# Patient Record
Sex: MALE | Race: OTHER | HISPANIC OR LATINO | Employment: OTHER | URBAN - METROPOLITAN AREA
[De-identification: names, ages, dates, MRNs, and addresses within clinical notes are randomized per-mention and may not be internally consistent; named-entity substitution may affect disease eponyms.]

---

## 2017-10-09 ENCOUNTER — HOSPITAL ENCOUNTER (EMERGENCY)
Facility: HOSPITAL | Age: 27
Discharge: HOME | End: 2017-10-09
Attending: EMERGENCY MEDICINE | Admitting: EMERGENCY MEDICINE
Payer: COMMERCIAL

## 2017-10-09 VITALS
HEART RATE: 66 BPM | TEMPERATURE: 97.7 F | DIASTOLIC BLOOD PRESSURE: 68 MMHG | WEIGHT: 175 LBS | RESPIRATION RATE: 18 BRPM | OXYGEN SATURATION: 99 % | SYSTOLIC BLOOD PRESSURE: 131 MMHG

## 2017-10-09 DIAGNOSIS — R51.9 HEADACHE: Primary | ICD-10-CM

## 2017-10-09 PROCEDURE — 99283 EMERGENCY DEPT VISIT LOW MDM: CPT

## 2017-10-09 PROCEDURE — 96374 THER/PROPH/DIAG INJ IV PUSH: CPT

## 2017-10-09 PROCEDURE — 96375 TX/PRO/DX INJ NEW DRUG ADDON: CPT

## 2017-10-09 PROCEDURE — 96361 HYDRATE IV INFUSION ADD-ON: CPT

## 2017-10-09 RX ORDER — METOCLOPRAMIDE HYDROCHLORIDE 5 MG/ML
10 INJECTION INTRAMUSCULAR; INTRAVENOUS ONCE
Status: COMPLETED | OUTPATIENT
Start: 2017-10-09 | End: 2017-10-09

## 2017-10-09 RX ORDER — LISINOPRIL 20 MG/1
20 TABLET ORAL DAILY
COMMUNITY
End: 2017-11-23

## 2017-10-09 RX ORDER — DIPHENHYDRAMINE HYDROCHLORIDE 50 MG/ML
25 INJECTION INTRAMUSCULAR; INTRAVENOUS ONCE
Status: COMPLETED | OUTPATIENT
Start: 2017-10-09 | End: 2017-10-09

## 2017-10-09 RX ADMIN — METOCLOPRAMIDE 10 MG: 5 INJECTION, SOLUTION INTRAMUSCULAR; INTRAVENOUS at 19:15

## 2017-10-09 RX ADMIN — DIPHENHYDRAMINE HYDROCHLORIDE 25 MG: 50 INJECTION, SOLUTION INTRAMUSCULAR; INTRAVENOUS at 19:12

## 2017-10-09 RX ADMIN — SODIUM CHLORIDE 1000 ML: 0.9 INJECTION, SOLUTION INTRAVENOUS at 19:08

## 2017-10-09 NOTE — ED PROVIDER NOTES
History  Chief Complaint   Patient presents with    Headache     pt reports head pain x past few days  + nausea  pt reports "my neurologist told me to come in for oxygen "     48-year-old male with a past medical history of Parkinson's disease and repeated head trauma reporting to the ER today with a 5 day history of headache  States the headache started by temporal then went to the right side of his occiput and now is on the left  Describes a sharp sensation  Worsened by light  Has described nausea but no vomiting  Denies neck pain, fevers, chills  History provided by:  Patient   used: No    Headache   Pain location:  Generalized  Quality:  Sharp  Severity currently:  4/10  Severity at highest:  8/10  Onset quality:  Gradual  Duration:  5 days  Timing:  Constant  Progression:  Worsening  Chronicity:  Recurrent  Context: bright light    Relieved by:  Nothing  Worsened by:  Nothing  Ineffective treatments:  None tried  Associated symptoms: photophobia    Associated symptoms: no abdominal pain, no back pain, no blurred vision, no congestion, no cough, no diarrhea, no dizziness, no drainage, no ear pain, no eye pain, no facial pain, no fatigue, no fever, no focal weakness, no hearing loss, no loss of balance, no myalgias, no nausea, no near-syncope, no neck pain, no neck stiffness, no numbness, no paresthesias, no seizures, no sinus pressure, no sore throat, no swollen glands, no syncope, no tingling, no URI, no visual change, no vomiting and no weakness    Risk factors: no anger, no family hx of SAH, does not have insomnia and lifestyle not sedentary        Prior to Admission Medications   Prescriptions Last Dose Informant Patient Reported?  Taking?   carbidopa-levodopa (SINEMET)  mg per tablet 10/9/2017 at 1530  Yes Yes   Sig: Take 1 tablet by mouth 3 (three) times a day   lisinopril (ZESTRIL) 20 mg tablet Past Week at Unknown time  Yes Yes   Sig: Take 20 mg by mouth daily Facility-Administered Medications: None       Past Medical History:   Diagnosis Date    Hypertension     MRSA carrier     Parkinson disease ([facility] [address] )        Past Surgical History:   Procedure Laterality Date    NO PAST SURGERIES         History reviewed  No pertinent family history  I have reviewed and agree with the history as documented  Social History   Substance Use Topics    Smoking status: Never Smoker    Smokeless tobacco: Never Used    Alcohol use No        Review of Systems   Constitutional: Negative for fatigue and fever  HENT: Negative for congestion, ear pain, hearing loss, postnasal drip, sinus pressure and sore throat  Eyes: Positive for photophobia  Negative for blurred vision and pain  Respiratory: Negative for cough  Cardiovascular: Negative for syncope and near-syncope  Gastrointestinal: Negative for abdominal pain, diarrhea, nausea and vomiting  Musculoskeletal: Negative for back pain, myalgias, neck pain and neck stiffness  Neurological: Positive for headaches  Negative for dizziness, focal weakness, seizures, weakness, numbness, paresthesias and loss of balance  All other systems reviewed and are negative  Physical Exam  ED Triage Vitals   Temperature Pulse Respirations Blood Pressure SpO2   10/09/17 1817 10/09/17 1817 10/09/17 1817 10/09/17 1817 10/09/17 1817   (!) 96 6 °F (35 9 °C) 89 18 (!) 169/107 98 %      Temp Source Heart Rate Source Patient Position - Orthostatic VS BP Location FiO2 (%)   10/09/17 1817 10/09/17 1817 10/09/17 1817 10/09/17 1817 --   Tympanic Monitor Sitting Left arm       Pain Score       10/09/17 1818       6           Physical Exam   Constitutional: He is oriented to person, place, and time  He appears well-developed and well-nourished  HENT:   Head: Normocephalic and atraumatic     Right Ear: External ear normal    Left Ear: External ear normal    Nose: Nose normal    Mouth/Throat: Oropharynx is clear and moist    Eyes: Conjunctivae and EOM are normal  Pupils are equal, round, and reactive to light  Neck: Normal range of motion  Neck supple  No JVD present  No tracheal deviation present  No thyromegaly present  Cardiovascular: Normal rate, regular rhythm, normal heart sounds and intact distal pulses  Exam reveals no gallop and no friction rub  No murmur heard  Pulmonary/Chest: Effort normal and breath sounds normal  No stridor  No respiratory distress  He has no wheezes  He has no rales  He exhibits no tenderness  Abdominal: Soft  Bowel sounds are normal  He exhibits no distension and no mass  There is no tenderness  There is no rebound and no guarding  No hernia  Musculoskeletal: Normal range of motion  He exhibits no edema, tenderness or deformity  Lymphadenopathy:     He has no cervical adenopathy  Neurological: He is alert and oriented to person, place, and time  He has normal reflexes  He displays normal reflexes  No cranial nerve deficit  He exhibits normal muscle tone  Coordination normal    Skin: Skin is warm  No rash noted  No erythema  No pallor  Psychiatric: He has a normal mood and affect  His behavior is normal  Judgment and thought content normal    Nursing note and vitals reviewed  ED Medications  Medications   sodium chloride 0 9 % bolus 1,000 mL (1,000 mL Intravenous New Bag 10/9/17 1908)   metoclopramide (REGLAN) injection 10 mg (10 mg Intravenous Given 10/9/17 1915)   diphenhydrAMINE (BENADRYL) injection 25 mg (25 mg Intravenous Given 10/9/17 1912)       Diagnostic Studies  Labs Reviewed - No data to display    No orders to display       Procedures  Procedures      Phone Consults  ED Phone Contact    ED Course  ED Course as of Oct 09 1959   Mon Oct 09, 2017   1949 On re-evaluation patient states his pain is now 0/10  States his nausea is gone                                  MDM  Number of Diagnoses or Management Options  Headache: new and requires workup  Diagnosis management comments: A/P:  28year-old male, with a past medical history of newly diagnosed Parkinson's disease, presenting to the ER today with 5 day history of headache  Migraine versus cluster headache  Will treat patient with Toradol, Reglan, Benadryl  Will put patient on 2 L nasal cannula  ED Course:   Patient has significant relief of his symptoms via intravenous medications and oxygen  Disposition:  I will discharge patient at this time with follow-up with his neurologist              Amount and/or Complexity of Data Reviewed  Clinical lab tests: ordered and reviewed  Tests in the radiology section of CPT®: ordered and reviewed  Tests in the medicine section of CPT®: reviewed and ordered  Decide to obtain previous medical records or to obtain history from someone other than the patient: yes  Independent visualization of images, tracings, or specimens: yes    Patient Progress  Patient progress: stable    CritCare Time    Disposition  Final diagnoses:   Headache     ED Disposition     ED Disposition Condition Comment    Discharge  [facility] discharge to home/self care  Condition at discharge: Good        Follow-up Information     Follow up With Specialties Details Why Contact Info    Darylene Ness, CRNP Family Medicine Schedule an appointment as soon as possible for a visit  [address] [place], [place] [address]  [phone]          Patient's Medications   Discharge Prescriptions    No medications on file     No discharge procedures on file  ED Provider  Attending physically available and evaluated [facility]  I managed the patient along with the ED Attending      Electronically Signed by       Rosalee Cranker, DO  Resident  10/09/17 0509

## 2017-10-09 NOTE — ED ATTENDING ATTESTATION
I, Raya James DO, saw and evaluated the patient  I have discussed the patient with the resident/non-physician practitioner and agree with the resident's/non-physician practitioner's findings, Plan of Care, and MDM as documented in the resident's/non-physician practitioner's note, except where noted  All available labs and Radiology studies were reviewed  At this point I agree with the current assessment done in the Emergency Department  I have conducted an independent evaluation of this patient a history and physical is as follows:      Critical Care Time  CritCare Time      28 yr old male with headache for the last five days  Wax and wane  Sharp pains in neck and trap that moves from left to right  Neck always stiff and sore  No eye tearing or clustering  Getting the discomfort for over two years  Dx with Parkinsons two months ago and started on Carbo/levo  ? Related  Exm: NAD, disk sharp wo light sense,  Tender left trap more than right with some limit to rotation to the left and more discomfort  Neg nuchal   No rash, fever or nodes  Cranial grossly intact  PLn: headache cocktail with fu with his neuro tomorrow  Told not to wait for appt on the 24th

## 2017-10-09 NOTE — DISCHARGE INSTRUCTIONS
Acute Headache, Ambulatory Care   GENERAL INFORMATION:   An acute headache  is pain or discomfort that starts suddenly and gets worse quickly  The cause of an acute headache may not be known  It may be triggered by stress, fatigue, hormones, food, or trauma  Common related symptoms include the following:   · Fever    · Sinus pressure    · Loss of memory    · Nausea or vomiting    · Problems with your vision, such as watery or red eyes, loss of vision, or pain in bright light    · Stiff neck    · Tenderness of the head and neck area    · Trouble staying awake, or being less alert than usual     · Weakness or less energy  Seek immediate care for the following symptoms:   · Severe pain    · A headache that occurs after a blow to the head, a fall, or other trauma     · Confusion or forgetfulness    · Numbness on one side of your face or body  Treatment for an acute headache  may include medicine to decrease pain  You may also need biofeedback or cognitive behavioral therapy  Ask your healthcare provider about these and other treatments for an acute headache  Manage my symptoms:   · Apply heat  on your head for 20 to 30 minutes every 2 hours for as many days as directed  Heat helps decrease pain and muscle spasms  You may alternate heat and ice  · Apply ice  on your head for 15 to 20 minutes every hour or as directed  Use an ice pack, or put crushed ice in a plastic bag  Cover it with a towel  Ice helps decrease pain  · Relax your muscles  Lie down in a comfortable position and close your eyes  Relax your muscles slowly  Start at your toes and work your way up your body  · Keep a record of your headaches  Write down when your headaches start and stop  Include your symptoms and what you were doing when the headache began  Record what you ate or drank for 24 hours before the headache started  Describe the pain and where it hurts  Keep track of what you did to treat your headache and whether it worked    Follow up with your healthcare provider as directed:  Bring your headache record with you when you see your healthcare provider  Write down your questions so you remember to ask them during your visits  CARE AGREEMENT:   You have the right to help plan your care  Learn about your health condition and how it may be treated  Discuss treatment options with your caregivers to decide what care you want to receive  You always have the right to refuse treatment  The above information is an  only  It is not intended as medical advice for individual conditions or treatments  Talk to your doctor, nurse or pharmacist before following any medical regimen to see if it is safe and effective for you  © 2014 [address] is for End User's use only and may not be sold, redistributed or otherwise used for commercial purposes  All illustrations and images included in CareNotes® are the copyrighted property of [employer]  or Andrew Reyes

## 2017-11-23 ENCOUNTER — HOSPITAL ENCOUNTER (EMERGENCY)
Facility: HOSPITAL | Age: 27
Discharge: HOME | End: 2017-11-23
Attending: EMERGENCY MEDICINE | Admitting: EMERGENCY MEDICINE
Payer: COMMERCIAL

## 2017-11-23 VITALS
TEMPERATURE: 97.8 F | OXYGEN SATURATION: 97 % | RESPIRATION RATE: 18 BRPM | DIASTOLIC BLOOD PRESSURE: 92 MMHG | SYSTOLIC BLOOD PRESSURE: 142 MMHG | HEART RATE: 88 BPM

## 2017-11-23 DIAGNOSIS — K02.9 INFECTED DENTAL CARRIES: Primary | ICD-10-CM

## 2017-11-23 DIAGNOSIS — K04.7 INFECTED DENTAL CARRIES: Primary | ICD-10-CM

## 2017-11-23 PROCEDURE — 99282 EMERGENCY DEPT VISIT SF MDM: CPT

## 2017-11-23 RX ORDER — NAPROXEN 500 MG/1
500 TABLET ORAL 2 TIMES DAILY WITH MEALS
Qty: 20 TABLET | Refills: 0 | Status: SHIPPED | OUTPATIENT
Start: 2017-11-23 | End: 2018-05-10

## 2017-11-23 RX ORDER — NAPROXEN 500 MG/1
500 TABLET ORAL ONCE
Status: DISCONTINUED | OUTPATIENT
Start: 2017-11-23 | End: 2017-11-23 | Stop reason: HOSPADM

## 2017-11-23 RX ORDER — PENICILLIN V POTASSIUM 500 MG/1
500 TABLET ORAL 4 TIMES DAILY
Qty: 28 TABLET | Refills: 0 | Status: SHIPPED | OUTPATIENT
Start: 2017-11-23 | End: 2017-11-30

## 2017-11-23 RX ORDER — ACETAMINOPHEN 500 MG
1000 TABLET ORAL EVERY 6 HOURS PRN
Qty: 30 TABLET | Refills: 0 | Status: SHIPPED | OUTPATIENT
Start: 2017-11-23 | End: 2018-05-10

## 2017-11-23 RX ORDER — PENICILLIN V POTASSIUM 250 MG/1
500 TABLET ORAL ONCE
Status: DISCONTINUED | OUTPATIENT
Start: 2017-11-23 | End: 2017-11-23 | Stop reason: HOSPADM

## 2017-11-23 RX ORDER — ACETAMINOPHEN 325 MG/1
975 TABLET ORAL ONCE
Status: DISCONTINUED | OUTPATIENT
Start: 2017-11-23 | End: 2017-11-23 | Stop reason: HOSPADM

## 2017-11-23 NOTE — DISCHARGE INSTRUCTIONS
Dental Caries   WHAT YOU NEED TO KNOW:   Dental caries are also called cavities  Cavities are caused by bacteria  When the bacteria in tooth plaque (sticky film) mix with certain types of carbohydrate, this creates acid  The acid breaks down areas of enamel, which covers the outside of a tooth  This creates a small hole in the tooth called a cavity  DISCHARGE INSTRUCTIONS:   Return to the emergency department if:   · Your face, jaw, cheek, eye, or neck begin to swell  Contact your dentist if:   · You have a fever  · Your tooth pain gets worse  · You have questions or concerns about your condition or care  Follow up with your dentist as directed:  Write down your questions so you remember to ask them during your visits  Prevent dental caries:   · Brush your teeth at least 2 times a day with fluoride toothpaste  · Use dental floss to clean between your teeth at least once a day  · Rinse your mouth with water or mouthwash after meals and snacks  · Chew sugarless gum after meals and snacks  · See your dentist regularly for dental cleanings and oral exams  © 2017 [address] is for End User's use only and may not be sold, redistributed or otherwise used for commercial purposes  All illustrations and images included in CareNotes® are the copyrighted property of [employer]  or Andrew Reyes  The above information is an  only  It is not intended as medical advice for individual conditions or treatments  Talk to your doctor, nurse or pharmacist before following any medical regimen to see if it is safe and effective for you

## 2017-11-23 NOTE — ED PROVIDER NOTES
History  Chief Complaint   Patient presents with    Dental Pain     Pt c/o right sided dental pain  Pt states he has had a hole in it for about 4 months  Pt presents with increased pain     This is a 28 y o  old male who presents to the ED for evaluation of dental pain  Patient has had pain in his tooth on the right side for 3 or 4 months  Got acutely worse yesterday  He attempted to brush it and use mouthwash which has helped in the past and it did not help this time  He he has not used any medications to help with it  No facial swelling  No discharge  Eating on feel any masses  It does not have a dentist           Prior to Admission Medications   Prescriptions Last Dose Informant Patient Reported? Taking?   carbidopa-levodopa (SINEMET)  mg per tablet 11/22/2017 at Unknown time  Yes Yes   Sig: Take 1 tablet by mouth 3 (three) times a day      Facility-Administered Medications: None     Past Medical History:   Diagnosis Date    GERD (gastroesophageal reflux disease)     Hypertension     MRSA carrier     Parkinson disease ([facility] [address] )      Past Surgical History:   Procedure Laterality Date    NO PAST SURGERIES       History reviewed  No pertinent family history  I have reviewed and agree with the history as documented  Social History   Substance Use Topics    Smoking status: Former Smoker     Quit date: 2009    Smokeless tobacco: Never Used    Alcohol use Yes      Comment: rarely      Review of Systems   Constitutional: Negative for fatigue and fever  HENT: Positive for dental problem  Negative for congestion and rhinorrhea  Respiratory: Negative for cough  Cardiovascular: Negative for chest pain  Gastrointestinal: Negative for abdominal pain, nausea and vomiting  Genitourinary: Negative for dysuria and frequency  Musculoskeletal: Negative for neck pain and neck stiffness  Neurological: Negative for dizziness and syncope  All other systems reviewed and are negative      Physical Exam  ED Triage Vitals [11/23/17 1256]   Temperature Pulse Respirations Blood Pressure SpO2   97 8 °F (36 6 °C) 88 18 142/92 97 %      Temp Source Heart Rate Source Patient Position - Orthostatic VS BP Location FiO2 (%)   Oral Monitor Sitting Right arm --      Pain Score       4         Physical Exam   Constitutional: He is oriented to person, place, and time  He appears well-developed and well-nourished  No distress  HENT:   Head: Normocephalic and atraumatic  Mouth/Throat:       Neck: Normal range of motion  Pulmonary/Chest: Effort normal  No stridor  No respiratory distress  Musculoskeletal: Normal range of motion  Neurological: He is alert and oriented to person, place, and time  Moving all extremities equally   Skin: Skin is warm and dry  No rash noted  He is not diaphoretic  Psychiatric: He has a normal mood and affect  Nursing note and vitals reviewed  ED Medications  Medications - No data to display    Diagnostic Studies  Results Reviewed     None        No orders to display     Procedures  Procedures    Phone Consults  ED Phone Contact    ED Course    A/P: This is a 28 y o  male who presents to the ED for evaluation of dental Isabella  Suspect patient has developed irreversible pulpitis given the course of symptoms  Antibiotics, NSAIDs, dental follow-up  Return precautions discussed  Patient and family acknowledge receipt of same  Patient is in agreement with this plan as outlined above  MDM  CritCare Time    Disposition  Final diagnoses:   Infected dental carries     Time reflects when diagnosis was documented in both MDM as applicable and the Disposition within this note     Time User Action Codes Description Comment    11/23/2017  1:30 PM Cristopher Gonzales Add [K02 9,  K04 7] Infected dental carries       ED Disposition     ED Disposition Condition Comment    Discharge  [facility] discharge to home/self care      Condition at discharge: Stable        Follow-up Information     Follow up With Specialties Details Why Contact Info    Magaly Lindquist MD  Schedule an appointment as soon as possible for a visit As needed [id number] [facility]       [address]  Call in 1 day For reevaluation [address]  [address]  [phone]        Discharge Medication List as of 11/23/2017  1:46 PM      START taking these medications    Details   acetaminophen (TYLENOL) 500 mg tablet Take 2 tablets by mouth every 6 (six) hours as needed for mild pain, Starting u 11/23/2017, Print      naproxen (NAPROSYN) 500 mg tablet Take 1 tablet by mouth 2 (two) times a day with meals, Starting u 11/23/2017, Print      penicillin V potassium (VEETID) 500 mg tablet Take 1 tablet by mouth 4 (four) times a day for 7 days, Starting u 11/23/2017, Until u 11/30/2017, Print         CONTINUE these medications which have NOT CHANGED    Details   carbidopa-levodopa (SINEMET)  mg per tablet Take 1 tablet by mouth 3 (three) times a day, Historical Med           No discharge procedures on file  ED Provider  Attending physically available and evaluated Lorenzo Cornejo I managed the patient along with the ED Attending      Electronically Signed by         Elise Baird MD  Resident  11/23/17 2011

## 2017-11-23 NOTE — ED ATTENDING ATTESTATION
Samantha Peres MD, saw and evaluated the patient  I have discussed the patient with the resident/non-physician practitioner and agree with the resident's/non-physician practitioner's findings, Plan of Care, and MDM as documented in the resident's/non-physician practitioner's note, except where noted  All available labs and Radiology studies were reviewed  At this point I agree with the current assessment done in the Emergency Department  I have conducted an independent evaluation of this patient a history and physical is as follows:    Dental pain, right upper dental pain which is chronic but getting worse   On exam, carious tooth with no fluctuance or abscess, no trismus, nl neck exam    Critical Care Time  CritCare Time

## 2018-05-10 ENCOUNTER — APPOINTMENT (EMERGENCY)
Dept: RADIOLOGY | Facility: HOSPITAL | Age: 28
End: 2018-05-10
Payer: COMMERCIAL

## 2018-05-10 ENCOUNTER — HOSPITAL ENCOUNTER (EMERGENCY)
Facility: HOSPITAL | Age: 28
Discharge: HOME | End: 2018-05-11
Attending: EMERGENCY MEDICINE | Admitting: EMERGENCY MEDICINE
Payer: COMMERCIAL

## 2018-05-10 VITALS
BODY MASS INDEX: 27.41 KG/M2 | TEMPERATURE: 97.8 F | HEART RATE: 78 BPM | DIASTOLIC BLOOD PRESSURE: 112 MMHG | WEIGHT: 175 LBS | RESPIRATION RATE: 18 BRPM | OXYGEN SATURATION: 97 % | SYSTOLIC BLOOD PRESSURE: 182 MMHG

## 2018-05-10 DIAGNOSIS — L73.9 FOLLICULITIS: ICD-10-CM

## 2018-05-10 DIAGNOSIS — R51.9 HEAD PAIN: Primary | ICD-10-CM

## 2018-05-10 PROCEDURE — 96372 THER/PROPH/DIAG INJ SC/IM: CPT

## 2018-05-10 PROCEDURE — 70450 CT HEAD/BRAIN W/O DYE: CPT

## 2018-05-10 RX ORDER — KETOROLAC TROMETHAMINE 30 MG/ML
15 INJECTION, SOLUTION INTRAMUSCULAR; INTRAVENOUS ONCE
Status: COMPLETED | OUTPATIENT
Start: 2018-05-10 | End: 2018-05-10

## 2018-05-10 RX ADMIN — KETOROLAC TROMETHAMINE 15 MG: 30 INJECTION, SOLUTION INTRAMUSCULAR at 23:54

## 2018-05-11 PROCEDURE — 99284 EMERGENCY DEPT VISIT MOD MDM: CPT

## 2018-05-11 RX ORDER — MUPIROCIN CALCIUM 20 MG/G
CREAM TOPICAL 3 TIMES DAILY
Qty: 15 G | Refills: 0 | Status: SHIPPED | OUTPATIENT
Start: 2018-05-11 | End: 2018-06-07 | Stop reason: ALTCHOICE

## 2018-05-11 NOTE — ED ATTENDING ATTESTATION
Chelsy Lopez MD, saw and evaluated the patient  I have discussed the patient with the resident/non-physician practitioner and agree with the resident's/non-physician practitioner's findings, Plan of Care, and MDM as documented in the resident's/non-physician practitioner's note, except where noted  All available labs and Radiology studies were reviewed  At this point I agree with the current assessment done in the Emergency Department  I have conducted an independent evaluation of this patient a history and physical is as follows:      Critical Care Time  CritCare Time    Procedures     32 yo male with hx of tbi and parkinsonian symptoms, c/o pain on left side of head with pain with chewing and pushing on  Pt with hx of migratory lumps  No fever  vss, afebrile, lungs cta, rrr, abdomen soft nontender, no neuro deficits, skull ridge noted, nontender  Ct head

## 2018-05-11 NOTE — DISCHARGE INSTRUCTIONS
Folliculitis   WHAT YOU NEED TO KNOW:   Folliculitis is inflammation of your hair follicles  A hair follicle is a sac under your skin  Your hair grows out of the follicle  Folliculitis is caused by bacteria or fungus, most commonly a germ called Staph  Folliculitis can occur anywhere you have hair  DISCHARGE INSTRUCTIONS:   Medicines:   · Antibiotics: This medicine is given to fight or prevent an infection caused by bacteria  It may be given as an ointment that you apply to your skin or as a pill  Always take your antibiotics exactly as ordered by your healthcare provider  Never save antibiotics or take leftover antibiotics that were given to you for another illness  · Antifungal medicine: This medicine helps kill fungus that may be causing your folliculitis  It may be given as an cream that you apply to your skin or as a pill  · Steroids: This medicine may be given to decrease inflammation  · NSAIDs , such as ibuprofen, help decrease swelling, pain, and fever  This medicine is available with or without a doctor's order  NSAIDs can cause stomach bleeding or kidney problems in certain people  If you take blood thinner medicine, always ask if NSAIDs are safe for you  Always read the medicine label and follow directions  Do not give these medicines to children under 10months of age without direction from your child's healthcare provider  · Antihistamines: This medicine may be given to help decrease itching  · Take your medicine as directed  Contact your healthcare provider if you think your medicine is not helping or if you have side effects  Tell him or her if you are allergic to any medicine  Keep a list of the medicines, vitamins, and herbs you take  Include the amounts, and when and why you take them  Bring the list or the pill bottles to follow-up visits  Carry your medicine list with you in case of an emergency    Follow up with your healthcare provider or dermatologist as directed: Write down your questions so you remember to ask them during your visits  Manage folliculitis:   · Use warm compresses:  Wet a washcloth with warm water and apply it to the infected skin area to help decrease pain and swelling  Warm compresses may also help drain pus and improve healing  · Clean the area:  Use antibacterial soap to wash the affected area  Change your washcloths and towels every day  · Avoid shaving the area: If possible, do not shave areas that have folliculitis  If you must shave, use an electric razor or new blade every time you shave  Prevent folliculitis:   · Do not share personal items:  Do not share towels, soap, or any personal items with other people  · Do not wear tight clothing:  Do not wear tight-fitting clothes that rub against and irritate your skin  · Treat skin injuries right away:  Treat injuries such as cuts and scrapes right away  Wash them with warm, soapy water, and cover the area to prevent infection  Contact your healthcare provider or dermatologist if:   · You have a fever  · You have foul-smelling pus coming from the bumps on your skin  · Your rash is spreading  · You have questions or concerns about your condition or care  Seek care immediately if:  · You develop large areas of red, warm, tender skin around the folliculitis  · You develop boils  © 2017 [address] Information is for End User's use only and may not be sold, redistributed or otherwise used for commercial purposes  All illustrations and images included in CareNotes® are the copyrighted property of A D A M [employer]  or Andrew Reyes  The above information is an  only  It is not intended as medical advice for individual conditions or treatments  Talk to your doctor, nurse or pharmacist before following any medical regimen to see if it is safe and effective for you    Acute Headache   WHAT YOU NEED TO KNOW:   An acute headache is pain or discomfort that starts suddenly and gets worse quickly  You may have an acute headache only when you feel stress or eat certain foods  Other acute headache pain can happen every day, and sometimes several times a day  DISCHARGE INSTRUCTIONS:   Return to the emergency department if:   · You have severe pain  · You have numbness or weakness on one side of your face or body  · You have a headache that occurs after a blow to the head, a fall, or other trauma  · You have a headache, are forgetful or confused, or have trouble speaking  · You have a headache, stiff neck, and a fever  Contact your healthcare provider if:   · You have a constant headache and are vomiting  · You have a headache each day that does not get better, even after treatment  · You have changes in your headaches, or new symptoms that occur when you have a headache  · You have questions or concerns about your condition or care  Medicines: You may need any of the following:  · Prescription pain medicine  may be given  The medicine your healthcare provider recommends will depend on the kind of headaches you have  You will need to take prescription headache medicines as directed to prevent a problem called rebound headache  These headaches happen with regular use of pain relievers for headache disorders  · NSAIDs , such as ibuprofen, help decrease swelling, pain, and fever  This medicine is available with or without a doctor's order  NSAIDs can cause stomach bleeding or kidney problems in certain people  If you take blood thinner medicine, always ask your healthcare provider if NSAIDs are safe for you  Always read the medicine label and follow directions  · Acetaminophen  decreases pain and fever  It is available without a doctor's order  Ask how much to take and how often to take it  Follow directions   Read the labels of all other medicines you are using to see if they also contain acetaminophen, or ask your doctor or pharmacist  Acetaminophen can cause liver damage if not taken correctly  Do not use more than 3 grams (3,000 milligrams) total of acetaminophen in one day  · Antidepressants  may be given for some kinds of headaches  · Take your medicine as directed  Contact your healthcare provider if you think your medicine is not helping or if you have side effects  Tell him or her if you are allergic to any medicine  Keep a list of the medicines, vitamins, and herbs you take  Include the amounts, and when and why you take them  Bring the list or the pill bottles to follow-up visits  Carry your medicine list with you in case of an emergency  Manage your symptoms:   · Apply heat or ice  on the headache area  Use a heat or ice pack  For an ice pack, you can also put crushed ice in a plastic bag  Cover the pack or bag with a towel before you apply it to your skin  Ice and heat both help decrease pain, and heat also helps decrease muscle spasms  Apply heat for 20 to 30 minutes every 2 hours  Apply ice for 15 to 20 minutes every hour  Apply heat or ice for as long and for as many days as directed  You may alternate heat and ice  · Relax your muscles  Lie down in a comfortable position and close your eyes  Relax your muscles slowly  Start at your toes and work your way up your body  · Keep a record of your headaches  Write down when your headaches start and stop  Include your symptoms and what you were doing when the headache began  Record what you ate or drank for 24 hours before the headache started  Describe the pain and where it hurts  Keep track of what you did to treat your headache and if it worked  Prevent an acute headache:   · Avoid anything that triggers an acute headache  Examples include exposure to chemicals, going to high altitude, or not getting enough sleep  Create a regular sleep routine  Go to sleep at the same time and wake up at the same time each day   Do not use electronic devices before bedtime  These may trigger a headache or prevent you from sleeping well  · Do not smoke  Nicotine and other chemicals in cigarettes and cigars can trigger an acute headache or make it worse  Ask your healthcare provider for information if you currently smoke and need help to quit  E-cigarettes or smokeless tobacco still contain nicotine  Talk to your healthcare provider before you use these products  · Limit alcohol as directed  Alcohol can trigger an acute headache or make it worse  If you have cluster headaches, do not drink alcohol during an episode  For other types of headaches, ask your healthcare provider if it is safe for you to drink alcohol  Ask how much is safe for you to drink, and how often  · Exercise as directed  Exercise can reduce tension and help with headache pain  Aim for 30 minutes of physical activity on most days of the week  Your healthcare provider can help you create an exercise plan  · Eat a variety of healthy foods  Healthy foods include fruits, vegetables, low-fat dairy products, lean meats, fish, whole grains, and cooked beans  Your healthcare provider or dietitian can help you create meals plans if you need to avoid foods that trigger headaches  Follow up with your healthcare provider as directed:  Bring your headache record with you when you see your healthcare provider  Write down your questions so you remember to ask them during your visits  © 2017 [address]  Information is for End User's use only and may not be sold, redistributed or otherwise used for commercial purposes  All illustrations and images included in CareNotes® are the copyrighted property of [employer]  or Andrew Reyes  The above information is an  only  It is not intended as medical advice for individual conditions or treatments  Talk to your doctor, nurse or pharmacist before following any medical regimen to see if it is safe and effective for you
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
## 2018-06-07 ENCOUNTER — HOSPITAL ENCOUNTER (EMERGENCY)
Facility: HOSPITAL | Age: 28
Discharge: HOME | End: 2018-06-07
Attending: EMERGENCY MEDICINE
Payer: COMMERCIAL

## 2018-06-07 ENCOUNTER — APPOINTMENT (EMERGENCY)
Dept: RADIOLOGY | Facility: HOSPITAL | Age: 28
End: 2018-06-07
Payer: COMMERCIAL

## 2018-06-07 VITALS
DIASTOLIC BLOOD PRESSURE: 84 MMHG | BODY MASS INDEX: 27.41 KG/M2 | TEMPERATURE: 97.3 F | RESPIRATION RATE: 18 BRPM | WEIGHT: 175 LBS | OXYGEN SATURATION: 99 % | SYSTOLIC BLOOD PRESSURE: 158 MMHG | HEART RATE: 86 BPM

## 2018-06-07 DIAGNOSIS — S62.609A FINGER FRACTURE, RIGHT: Primary | ICD-10-CM

## 2018-06-07 PROCEDURE — 73140 X-RAY EXAM OF FINGER(S): CPT

## 2018-06-07 PROCEDURE — 99283 EMERGENCY DEPT VISIT LOW MDM: CPT

## 2018-06-07 RX ORDER — IBUPROFEN 600 MG/1
600 TABLET ORAL EVERY 6 HOURS PRN
Qty: 30 TABLET | Refills: 0 | Status: SHIPPED | OUTPATIENT
Start: 2018-06-07 | End: 2019-11-11

## 2018-06-07 RX ORDER — CEPHALEXIN 500 MG/1
500 CAPSULE ORAL EVERY 6 HOURS SCHEDULED
Qty: 20 CAPSULE | Refills: 0 | Status: SHIPPED | OUTPATIENT
Start: 2018-06-07 | End: 2018-06-07

## 2018-06-07 RX ORDER — CEPHALEXIN 250 MG/1
500 CAPSULE ORAL ONCE
Status: DISCONTINUED | OUTPATIENT
Start: 2018-06-07 | End: 2018-06-07

## 2018-06-07 NOTE — DISCHARGE INSTRUCTIONS
Ibuprofen 600mg by mouth every 6 hours as needed for mild to moderate pain or fever  Acetaminophen 650mg by mouth every 8 hours as needed for mild to moderate pain or fever  You can take Ibuprofen and Acetaminophen together safely  Follow up with the hand specialist in 1 week  Call to make an appointment to be seen  Finger Fracture   WHAT YOU NEED TO KNOW:   A finger fracture is a break in 1 or more of the bones in your finger  DISCHARGE INSTRUCTIONS:   Return to the emergency department if:   · Your cast or splint gets wet, damaged, or comes off  · Your splint or cast feels too tight  · You have severe pain  · Your injured finger is numb, cold, or pale  Contact your healthcare provider or hand specialist if:   · Your pain or swelling gets worse, even after treatment  · You have questions or concerns about your condition or care  Medicines:   · NSAIDs , such as ibuprofen, help decrease swelling, pain, and fever  This medicine is available with or without a doctor's order  NSAIDs can cause stomach bleeding or kidney problems in certain people  If you take blood thinner medicine, always ask your healthcare provider if NSAIDs are safe for you  Always read the medicine label and follow directions  · Acetaminophen  decreases pain and fever  It is available without a doctor's order  Ask how much to take and how often to take it  Follow directions  Acetaminophen can cause liver damage if not taken correctly  · Prescription pain medicine  may be given  Ask your healthcare provider how to take this medicine safely  Some prescription pain medicines contain acetaminophen  Do not take other medicines that contain acetaminophen without talking to your healthcare provider  Too much acetaminophen may cause liver damage  Prescription pain medicine may cause constipation  Ask your healthcare provider how to prevent or treat constipation  · Take your medicine as directed    Contact your healthcare provider if you think your medicine is not helping or if you have side effects  Tell him or her if you are allergic to any medicine  Keep a list of the medicines, vitamins, and herbs you take  Include the amounts, and when and why you take them  Bring the list or the pill bottles to follow-up visits  Carry your medicine list with you in case of an emergency  Self-care:   · Wear your splint as directed  Do not remove your splint until you follow up with your healthcare provider or hand specialist      · Apply ice  on your finger for 15 to 20 minutes every hour or as directed  Use an ice pack, or put crushed ice in a plastic bag  Cover it with a towel before you apply it to your skin  Ice helps prevent tissue damage and decreases swelling and pain  · Elevate  your finger above the level of your heart as often as you can  This will help decrease swelling and pain  Prop your hand on pillows or blankets to keep it elevated comfortably  · Go to physical therapy as directed  A physical therapist teaches you exercises to help improve movement and strength, and to decrease pain  Follow up with your healthcare provider or hand specialist within 2 days:  Write down your questions so you remember to ask them during your visits  © 2017 [address]  Information is for End User's use only and may not be sold, redistributed or otherwise used for commercial purposes  All illustrations and images included in CareNotes® are the copyrighted property of A [employer] A [employer]  or Andrew Reyes  The above information is an  only  It is not intended as medical advice for individual conditions or treatments  Talk to your doctor, nurse or pharmacist before following any medical regimen to see if it is safe and effective for you

## 2018-06-08 NOTE — ED PROVIDER NOTES
History  Chief Complaint   Patient presents with    Finger Injury     pt states he was carrying an A/c when he hurt his right pinky  prior hx fx to that finger  unable to bend  54-year-old male with past medical history of Parkinson's disease, previous fracture to the right 5th finger, who presents to the emergency department for right 5th finger injury that was sustained 3 weeks ago after he was caring a air conditioner and smashed it against a door frame  He reports at the deformity to the DIPJ of the right 5th finger which is also tender to palpation  Endorses mild edema, but denies any redness or warmth  Denies fevers or chills  He currently rates the pain as a 5/10  Did not take anything for pain prior to arrival         History provided by:  Patient   used: No        Prior to Admission Medications   Prescriptions Last Dose Informant Patient Reported? Taking? rotigotine (NEUPRO) 2 MG/24HR Past Week at Unknown time  Yes Yes   Sig: Place 1 patch on skin daily, then put 2 patches on daily  Facility-Administered Medications: None       Past Medical History:   Diagnosis Date    GERD (gastroesophageal reflux disease)     Hypertension     MRSA carrier     [facility]        Past Surgical History:   Procedure Laterality Date    NO PAST SURGERIES         History reviewed  No pertinent family history  I have reviewed and agree with the history as documented  Social History   Substance Use Topics    Smoking status: Former Smoker     Quit date: 2009    Smokeless tobacco: Never Used    Alcohol use No        Review of Systems   Constitutional: Negative for chills and fever  HENT: Negative for congestion, ear pain, postnasal drip, rhinorrhea, sinus pain, sinus pressure, sore throat and trouble swallowing  Eyes: Negative  Respiratory: Negative for cough, chest tightness, shortness of breath and wheezing      Cardiovascular: Negative for chest pain, palpitations and leg swelling  Gastrointestinal: Negative for abdominal pain, anal bleeding, constipation, diarrhea, nausea and vomiting  Genitourinary: Negative for dysuria, flank pain, frequency, hematuria and urgency  Musculoskeletal: Positive for arthralgias and joint swelling  Negative for back pain, gait problem, myalgias, neck pain and neck stiffness  Skin: Negative for color change, pallor, rash and wound  Neurological: Negative for dizziness, syncope, weakness, light-headedness, numbness and headaches  Psychiatric/Behavioral: Negative  Physical Exam  Physical Exam   Constitutional: He is oriented to person, place, and time  He appears well-developed and well-nourished  HENT:   Head: Normocephalic and atraumatic  Eyes: Conjunctivae and EOM are normal  Pupils are equal, round, and reactive to light  Neck: Normal range of motion  Neck supple  Cardiovascular: Normal rate and intact distal pulses  Pulmonary/Chest: Effort normal    Abdominal: Soft  There is no tenderness  Musculoskeletal: He exhibits edema, tenderness and deformity  There is tenderness well palpation to the DIPJ of the right 5th finger with mild deformity  Decreased range of motion of the distal right 5th finger secondary to pain  Neurological: He is alert and oriented to person, place, and time  No cranial nerve deficit or sensory deficit  He exhibits normal muscle tone  Coordination normal    Skin: Skin is warm and dry  Capillary refill takes less than 2 seconds  Psychiatric: He has a normal mood and affect  His behavior is normal    Nursing note and vitals reviewed        Vital Signs  ED Triage Vitals [06/07/18 1429]   Temperature Pulse Respirations Blood Pressure SpO2   (!) 97 3 °F (36 3 °C) 86 18 158/84 99 %      Temp Source Heart Rate Source Patient Position - Orthostatic VS BP Location FiO2 (%)   Oral Monitor Sitting Right arm --      Pain Score       4           Vitals:    06/07/18 1429   BP: 158/84 Pulse: 86   Patient Position - Orthostatic VS: Sitting       Visual Acuity      ED Medications  Medications - No data to display    Diagnostic Studies  Results Reviewed     None                 XR finger fifth digit-pinkie RIGHT   ED Interpretation by Cj Plasencia PA-C (06/07 9410)   Fracture to base of right 5th distal phalanx  Final Result by Mariaelena Carpenter MD (06/07 4104)   Base of right 5th distal phalanx dorsally avulsion fracture with slight distraction  Findings concur with ED results as provided in PACS viewer/EPIC at the time of interpretation  Workstation performed: [id number]                    Procedures  Orthopedic Injury  Date/Time: 6/7/2018 3:40 PM  Performed by: Ryanne Sloan  Authorized by: Burke Mejia   Consent: Verbal consent obtained    Risks and benefits: risks, benefits and alternatives were discussed  Consent given by: patient  Patient identity confirmed: verbally with patient  Injury location: finger  Location details: right little finger  Injury type: fracture  Fracture type: distal phalanx  MCP joint involved: no  Any IP joint involved: yes  Pre-procedure neurovascular assessment: neurovascularly intact  Pre-procedure distal perfusion: normal  Pre-procedure neurological function: normal  Pre-procedure range of motion: reduced  Immobilization: splint  Splint type: finger splint, static  Supplies used: aluminum splint  Post-procedure neurovascular assessment: post-procedure neurovascularly intact  Post-procedure distal perfusion: normal  Post-procedure neurological function: normal  Post-procedure range of motion: unchanged  Patient tolerance: Patient tolerated the procedure well with no immediate complications             Phone Contacts  ED Phone Contact    ED Course                               MDM  Number of Diagnoses or Management Options  Finger fracture, right:   Diagnosis management comments: Differential Diagnosis includes but is not limited to: sprain/strain, contusion, fracture/dislocation, musculoskeletal pain  XR shows Base of right 5th distal phalanx dorsally avulsion fracture with slight distraction  Placed in finger splint  Give IBU in the ED  Dc home with ortho f/u  Amount and/or Complexity of Data Reviewed  Tests in the radiology section of CPT®: ordered and reviewed  Independent visualization of images, tracings, or specimens: yes      CritCare Time    Disposition  Final diagnoses:   Finger fracture, right     Time reflects when diagnosis was documented in both MDM as applicable and the Disposition within this note     Time User Action Codes Description Comment    6/7/2018  3:36 PM Francie Gosselin Add [M08 287] Right hand pain     6/7/2018  3:36 PM Naman Weiner Add [R22 31] Localized swelling on right hand     6/7/2018  3:42 PM Naman Weiner Modify [R22 31] Localized swelling on right hand     6/7/2018  3:42 PM Francie Gosselin Remove [Y53 475] Right hand pain     6/7/2018  3:42 PM Naman Weiner Remove [R22 31] Localized swelling on right hand     6/7/2018  3:42 PM Francie Gosselin Add [S62 609A] Finger fracture, right       ED Disposition     ED Disposition Condition Comment    Discharge  [facility] discharge to home/self care      Condition at discharge: Good        Follow-up Information     Follow up With Specialties Details Why Cherelle Vernon MD Family Medicine In 5 days  [address] [place] [address] [address] [facility]   49  \[facility] [place] [facility]\"" 43       Curt Morton MD Orthopedic Surgery Schedule an appointment as soon as possible for a visit  [address]  [phone]            Discharge Medication List as of 6/7/2018  3:42 PM      START taking these medications    Details   ibuprofen (MOTRIN) 600 mg tablet Take 1 tablet (600 mg total) by mouth every 6 (six) hours as needed for mild pain or moderate pain, Starting Thu 6/7/2018, Print         CONTINUE these medications which have NOT CHANGED    Details   rotigotine (NEUPRO) 2 MG/24HR Place 1 patch on skin daily, then put 2 patches on daily  , Historical Med           No discharge procedures on file      ED Provider  Electronically Signed by           Julian Emery PA-C  06/07/18 2006

## 2018-06-11 ENCOUNTER — HOSPITAL ENCOUNTER (OUTPATIENT)
Dept: RADIOLOGY | Facility: HOSPITAL | Age: 28
Discharge: HOME | End: 2018-06-11

## 2018-06-11 ENCOUNTER — OFFICE VISIT (OUTPATIENT)
Dept: OBGYN CLINIC | Facility: HOSPITAL | Age: 28
End: 2018-06-11
Payer: COMMERCIAL

## 2018-06-11 VITALS
HEART RATE: 81 BPM | HEIGHT: 67 IN | SYSTOLIC BLOOD PRESSURE: 143 MMHG | DIASTOLIC BLOOD PRESSURE: 88 MMHG | WEIGHT: 175.04 LBS | BODY MASS INDEX: 27.47 KG/M2

## 2018-06-11 DIAGNOSIS — M79.644 PAIN IN FINGER OF RIGHT HAND: Primary | ICD-10-CM

## 2018-06-11 DIAGNOSIS — M20.011 MALLET FINGER OF RIGHT HAND: ICD-10-CM

## 2018-06-11 DIAGNOSIS — S62.639A CLOSED AVULSION FRACTURE OF DISTAL PHALANX OF FINGER, INITIAL ENCOUNTER: ICD-10-CM

## 2018-06-11 DIAGNOSIS — M79.644 PAIN IN FINGER OF RIGHT HAND: ICD-10-CM

## 2018-06-11 PROCEDURE — 99203 OFFICE O/P NEW LOW 30 MIN: CPT | Performed by: PHYSICIAN ASSISTANT
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
## 2018-06-15 ENCOUNTER — HOSPITAL ENCOUNTER (OUTPATIENT)
Dept: MRI IMAGING | Facility: HOSPITAL | Age: 28
Discharge: HOME | End: 2018-06-15
Payer: COMMERCIAL

## 2018-06-15 DIAGNOSIS — M79.644 PAIN IN FINGER OF RIGHT HAND: ICD-10-CM

## 2018-06-15 DIAGNOSIS — S62.639A CLOSED AVULSION FRACTURE OF DISTAL PHALANX OF FINGER, INITIAL ENCOUNTER: ICD-10-CM

## 2018-06-15 DIAGNOSIS — M20.011 MALLET FINGER OF RIGHT HAND: ICD-10-CM

## 2018-06-15 PROCEDURE — 73218 MRI UPPER EXTREMITY W/O DYE: CPT

## 2018-06-20 ENCOUNTER — OFFICE VISIT (OUTPATIENT)
Dept: OCCUPATIONAL THERAPY | Facility: HOSPITAL | Age: 28
End: 2018-06-20
Payer: COMMERCIAL

## 2018-06-20 ENCOUNTER — OFFICE VISIT (OUTPATIENT)
Dept: OBGYN CLINIC | Facility: HOSPITAL | Age: 28
End: 2018-06-20
Payer: COMMERCIAL

## 2018-06-20 VITALS
HEIGHT: 67 IN | WEIGHT: 174.4 LBS | DIASTOLIC BLOOD PRESSURE: 94 MMHG | HEART RATE: 91 BPM | BODY MASS INDEX: 27.37 KG/M2 | SYSTOLIC BLOOD PRESSURE: 166 MMHG

## 2018-06-20 DIAGNOSIS — M20.011 MALLET FINGER OF RIGHT HAND: ICD-10-CM

## 2018-06-20 DIAGNOSIS — M20.011 MALLET FINGER OF RIGHT HAND: Primary | ICD-10-CM

## 2018-06-20 PROCEDURE — 99214 OFFICE O/P EST MOD 30 MIN: CPT | Performed by: ORTHOPAEDIC SURGERY

## 2018-06-20 PROCEDURE — 26740 TREAT FINGER FRACTURE EACH: CPT | Performed by: ORTHOPAEDIC SURGERY

## 2018-06-20 PROCEDURE — 97760 ORTHOTIC MGMT&TRAING 1ST ENC: CPT | Performed by: OCCUPATIONAL THERAPIST

## 2018-06-20 NOTE — PROGRESS NOTES
Splint     Diagnosis:   1  Mallet finger of right hand  Ambulatory referral to PT/OT hand therapy     Indication: Motion Blocking    Location: Right  small finger  Supplies: Custom Fit Orthotic  Splint type: Digit Gutter  As well as oricast  Wearing Schedule: Remove for hygiene only  Describe Position: extended, neutral    Precautions: Fracture and Universal (skin contact/breakdown)    Patient or Caregiver expresses understanding of wearing Schedule and Precautions? Yes  Patient or Caregiver able to don/doff orthotic independently? Yes    Written orders provided to patient?  No  Orders Obtained: Written  Orders Obtained from: Dr Robert Acuña     Return for evaluation and treatment No
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
## 2018-06-29 PROCEDURE — G8991 OTHER PT/OT GOAL STATUS: HCPCS | Performed by: OCCUPATIONAL THERAPIST

## 2018-06-29 PROCEDURE — G8992 OTHER PT/OT  D/C STATUS: HCPCS | Performed by: OCCUPATIONAL THERAPIST

## 2018-06-29 PROCEDURE — G8990 OTHER PT/OT CURRENT STATUS: HCPCS | Performed by: OCCUPATIONAL THERAPIST

## 2018-08-03 ENCOUNTER — OFFICE VISIT (OUTPATIENT)
Dept: OBGYN CLINIC | Facility: HOSPITAL | Age: 28
End: 2018-08-03

## 2018-08-03 VITALS
DIASTOLIC BLOOD PRESSURE: 91 MMHG | HEIGHT: 67 IN | BODY MASS INDEX: 27.69 KG/M2 | HEART RATE: 69 BPM | SYSTOLIC BLOOD PRESSURE: 134 MMHG | WEIGHT: 176.4 LBS

## 2018-08-03 DIAGNOSIS — M20.011 MALLET FINGER OF RIGHT HAND: Primary | ICD-10-CM

## 2018-08-03 PROCEDURE — 99024 POSTOP FOLLOW-UP VISIT: CPT | Performed by: ORTHOPAEDIC SURGERY

## 2018-08-03 RX ORDER — LISINOPRIL 20 MG/1
TABLET ORAL EVERY 24 HOURS
COMMUNITY
Start: 2017-06-16 | End: 2018-09-18 | Stop reason: SDUPTHER

## 2018-08-03 RX ORDER — BUSPIRONE HYDROCHLORIDE 7.5 MG/1
TABLET ORAL EVERY 12 HOURS
COMMUNITY
Start: 2017-06-16 | End: 2019-02-21

## 2018-08-03 NOTE — PROGRESS NOTES
ASSESSMENT/PLAN:    Assessment:  Right small mallet finger (late May 2018)    Plan:   Patient instructed to continue splinting at night only for the 6 more weeks  Activities as tolerated  Call with any questions or concerns      Follow Up:  PRN    To Do Next Visit:       General Discussions:     Mallet Finger: The anatomy and physiology of a mallet finger was discussed with the patient today  Typically, the extensor tendon is torn off of the dorsal aspect of the distal phalanx  This results in flexion of the distal interphalangeal joint, with incomplete extension  Typically, and less the joint is subluxed, this is treated through conservative measures  An extension block splint is worn over the distal interphalangeal joint for 6 weeks continuously, followed by 6 weeks of nocturnal use  After healing, there is typically a small flexion deformity at the distal interphalangeal joint  Surgery does not typically change these results  Operative Discussions:      _____________________________________________________  CHIEF COMPLAINT:  Chief Complaint   Patient presents with    Right Little Finger - Follow-up         SUBJECTIVE:  Osei Adam is a 33y o  year old male who presents for follow up regarding right small mallet finger which occurred approximately late May 2018 while carrying an air conditioner  Patient was referred to hand therapy at the last visit for fabrication of an extension splint  Patient admits he has not been compliant wearing the splint consistently  He states some days he would go without  He states he made is own splint out of a popsicle stick     Patient stopped wearing a splint all together about 2 days ago (8/01/18)      PAST MEDICAL HISTORY:  Past Medical History:   Diagnosis Date    GERD (gastroesophageal reflux disease)     Hypertension     MRSA carrier     [facility] disease ([facility] )        PAST SURGICAL HISTORY:  Past Surgical History:   Procedure Laterality Date    NO PAST SURGERIES         FAMILY HISTORY:  No family history on file  SOCIAL HISTORY:  Social History   Substance Use Topics    Smoking status: Former Smoker     Quit date: 2009    Smokeless tobacco: Never Used    Alcohol use No       MEDICATIONS:    Current Outpatient Prescriptions:     busPIRone (BUSPAR) 7 5 mg tablet, Every 12 hours, Disp: , Rfl:     ibuprofen (MOTRIN) 600 mg tablet, Take 1 tablet (600 mg total) by mouth every 6 (six) hours as needed for mild pain or moderate pain, Disp: 30 tablet, Rfl: 0    lisinopril (ZESTRIL) 20 mg tablet, every 24 hours, Disp: , Rfl:     rotigotine (NEUPRO) 2 MG/24HR, Place 1 patch on skin daily, then put 2 patches on daily  , Disp: , Rfl:     ALLERGIES:  Allergies   Allergen Reactions    Pollen Extract      Seasonal allergies       REVIEW OF SYSTEMS:  Pertinent items are noted in HPI  A comprehensive review of systems was negative  LABS:  HgA1c: No results found for: HGBA1C  BMP:   Lab Results   Component Value Date    GLUCOSE 109 05/15/2016    CALCIUM 8 6 05/15/2016     (L) 05/15/2016    K 4 0 05/15/2016    CO2 26 05/15/2016    CL 99 (L) 05/15/2016    BUN 13 05/15/2016    CREATININE 1 00 05/15/2016           _____________________________________________________  PHYSICAL EXAMINATION:  General: well developed and well nourished, alert, oriented times 3 and appears comfortable  Psychiatric: Normal  HEENT: Trachea Midline, No torticollis  Cardiovascular: No discernable arrhythmia  Pulmonary: No wheezing or stridor  Skin: No masses, erthema, lacerations, fluctation, ulcerations  Neurovascular: Sensation Intact to the Median, Ulnar, Radial Nerve, Motor Intact to the Median, Ulnar, Radial Nerve and Pulses Intact    MUSCULOSKELETAL EXAMINATION:  RIGHT SIDE:  tenderness to palpation over the right small DIP joint  10 degree extensor lag of the right small finger DIP, full flexion of the right small finger     Healed extensor tendon    _____________________________________________________  STUDIES REVIEWED:  No Studies to review      PROCEDURES PERFORMED:  Procedures  No Procedures performed today         Scribe Attestation    I,:   Armando Patel am acting as a scribe while in the presence of the attending physician :        I,:   Demetrice Magana MD personally performed the services described in this documentation    as scribed in my presence :

## 2018-08-31 ENCOUNTER — HOSPITAL ENCOUNTER (EMERGENCY)
Facility: HOSPITAL | Age: 28
Discharge: HOME | End: 2018-08-31
Attending: EMERGENCY MEDICINE | Admitting: EMERGENCY MEDICINE
Payer: COMMERCIAL

## 2018-08-31 VITALS
TEMPERATURE: 98 F | SYSTOLIC BLOOD PRESSURE: 186 MMHG | HEART RATE: 79 BPM | OXYGEN SATURATION: 98 % | RESPIRATION RATE: 18 BRPM | WEIGHT: 173 LBS | BODY MASS INDEX: 27.1 KG/M2 | DIASTOLIC BLOOD PRESSURE: 96 MMHG

## 2018-08-31 DIAGNOSIS — T14.8XXA WOUND OF SKIN: Primary | ICD-10-CM

## 2018-08-31 PROCEDURE — 90471 IMMUNIZATION ADMIN: CPT

## 2018-08-31 PROCEDURE — 99283 EMERGENCY DEPT VISIT LOW MDM: CPT

## 2018-08-31 PROCEDURE — 90715 TDAP VACCINE 7 YRS/> IM: CPT | Performed by: PHYSICIAN ASSISTANT

## 2018-08-31 RX ORDER — CLINDAMYCIN HYDROCHLORIDE 300 MG/1
300 CAPSULE ORAL EVERY 8 HOURS SCHEDULED
Qty: 21 CAPSULE | Refills: 0 | Status: SHIPPED | OUTPATIENT
Start: 2018-08-31 | End: 2018-09-07

## 2018-08-31 RX ORDER — CLINDAMYCIN HYDROCHLORIDE 150 MG/1
150 CAPSULE ORAL EVERY 8 HOURS SCHEDULED
Qty: 21 CAPSULE | Refills: 0 | Status: SHIPPED | OUTPATIENT
Start: 2018-08-31 | End: 2018-09-07

## 2018-08-31 RX ADMIN — TETANUS TOXOID, REDUCED DIPHTHERIA TOXOID AND ACELLULAR PERTUSSIS VACCINE, ADSORBED 0.5 ML: 5; 2.5; 8; 8; 2.5 SUSPENSION INTRAMUSCULAR at 14:09

## 2018-08-31 NOTE — ED PROVIDER NOTES
History  Chief Complaint   Patient presents with    Penis Pain     Pt reports penile itching after shaving with a barbara razor approx 1 week ago  Patient presents to the emergency department today for evaluation of irritation in the shaft of the penis  Patient states 2 days ago he utilized a barbara razor to shave the skin of his penis  He has noticed irritation that region since that time  He denies penile discharge or pain  Denies testicular pain or swelling  No history of dysuria  No abdominal nausea vomiting or fevers  Prior to Admission Medications   Prescriptions Last Dose Informant Patient Reported? Taking?   busPIRone (BUSPAR) 7 5 mg tablet  Self Yes No   Sig: Every 12 hours   ibuprofen (MOTRIN) 600 mg tablet  Self No No   Sig: Take 1 tablet (600 mg total) by mouth every 6 (six) hours as needed for mild pain or moderate pain   lisinopril (ZESTRIL) 20 mg tablet  Self Yes No   Sig: every 24 hours   rotigotine (NEUPRO) 2 MG/24HR  Self Yes No   Sig: Place 1 patch on skin daily, then put 2 patches on daily  Facility-Administered Medications: None       Past Medical History:   Diagnosis Date    GERD (gastroesophageal reflux disease)     Hypertension     MRSA carrier     Parkinson disease ([facility] [address] )        Past Surgical History:   Procedure Laterality Date    NO PAST SURGERIES         History reviewed  No pertinent family history  I have reviewed and agree with the history as documented  Social History   Substance Use Topics    Smoking status: Former Smoker     Quit date: 2009    Smokeless tobacco: Never Used    Alcohol use No        Review of Systems   Constitutional: Negative  HENT: Negative  Eyes: Negative  Respiratory: Negative  Cardiovascular: Negative  Gastrointestinal: Negative  Endocrine: Negative  Genitourinary: Negative  Musculoskeletal: Negative  Skin: Positive for wound  Allergic/Immunologic: Negative  Neurological: Negative  Hematological: Negative  Psychiatric/Behavioral: Negative  Physical Exam  Physical Exam   Constitutional: He is oriented to person, place, and time  He appears well-developed and well-nourished  No distress  HENT:   Head: Normocephalic  Eyes: Pupils are equal, round, and reactive to light  Cardiovascular: Normal rate  Pulmonary/Chest: Effort normal    Genitourinary: No penile tenderness  Genitourinary Comments: No urethral discharge   Musculoskeletal: Normal range of motion  Neurological: He is alert and oriented to person, place, and time  Skin: Capillary refill takes less than 2 seconds  He is not diaphoretic  Patient appears to exhibit foreskin irritation likely secondary to shaving  There is no active bleeding  There is no papules or vesicles  No findings consistent with herpes  Vitals reviewed        Vital Signs  ED Triage Vitals [08/31/18 1353]   Temperature Pulse Respirations Blood Pressure SpO2   98 °F (36 7 °C) 79 18 (!) 186/96 98 %      Temp Source Heart Rate Source Patient Position - Orthostatic VS BP Location FiO2 (%)   Temporal Monitor -- Right arm --      Pain Score       No Pain           Vitals:    08/31/18 1353   BP: (!) 186/96   Pulse: 79       Visual Acuity      ED Medications  Medications   tetanus-diphtheria-acellular pertussis (BOOSTRIX) IM injection 0 5 mL (0 5 mL Intramuscular Given 8/31/18 1409)       Diagnostic Studies  Results Reviewed     None                 No orders to display              Procedures  Procedures       Phone Contacts  ED Phone Contact    ED Course  ED Course as of Aug 31 1411   Fri Aug 31, 2018   1354 Blood Pressure: (!) 186/96   1354 Temperature: 98 °F (36 7 °C)   1354 Pulse: 79   1354 Pulse: 79   1354 SpO2: 98 %                               MDM  CritCare Time    Disposition  Final diagnoses:   Wound of skin     Time reflects when diagnosis was documented in both MDM as applicable and the Disposition within this note     Time User Action Codes Description Comment    8/31/2018  2:05 PM Dora Shone D Add [R23 8] Wound of skin       ED Disposition     ED Disposition Condition Comment    Discharge  [facility] POINT discharge to home/self care  Condition at discharge: Good        Follow-up Information     Follow up With Specialties Details Why Contact Info    Brennen Randolph MD Family Medicine Schedule an appointment as soon as possible for a visit  [address] [place] [address]  [address] [facility]  [address]  [facility] [address]             Patient's Medications   Discharge Prescriptions    CLINDAMYCIN (CLEOCIN) 150 MG CAPSULE    Take 1 capsule (150 mg total) by mouth every 8 (eight) hours for 7 days       Start Date: 8/31/2018 End Date: 9/7/2018       Order Dose: 150 mg       Quantity: 21 capsule    Refills: 0    CLINDAMYCIN (CLEOCIN) 300 MG CAPSULE    Take 1 capsule (300 mg total) by mouth every 8 (eight) hours for 7 days       Start Date: 8/31/2018 End Date: 9/7/2018       Order Dose: 300 mg       Quantity: 21 capsule    Refills: 0     No discharge procedures on file      ED Provider  Electronically Signed by           Cristal Casey PA-C  08/31/18 0234

## 2018-08-31 NOTE — DISCHARGE INSTRUCTIONS
Acute Wound Care   AMBULATORY CARE:   An acute wound  is an injury that causes a break in the skin  An acute wound can happen suddenly, last a short time, and may heal on its own  Common signs and symptoms of an acute wound:   · A cut, tear, or gash in your skin    · Bleeding, swelling, pain, or trouble moving the affected area    · Dirt or foreign objects inside the wound     · Milky, yellow, green, or brown pus in the wound     · Red, tender, or warm area around the pus    · Fever  Seek care immediately if:   · You have pus or a foul odor coming from the wound  · You have sudden trouble breathing or chest pain  · Blood soaks through your bandage  Contact your healthcare provider if:   · You have muscle, joint, or body aches, sweating, or a fever  · You have more swelling, redness, or bleeding in your wound  · Your skin is itchy, swollen, or you have a rash  · You have questions or concerns about your condition or care  Treatment for an acute wound  may include any of the following:  · Cleansing  is done with soap and water to wash away germs and decrease the risk of infection  Sterile water further cleans the wound  The cleaning is done under high pressure with a catheter tip and large syringe  A solution that kills germs may also be used  · Debridement  is done to clean and remove objects, dirt, or dead tissues from the open wound  Healthcare providers may also drain the wound to clean out pus  · Closure of the wound  is done with stitches, staples, skin adhesive, or other treatments  This may be done if the wound is wide or deep  Stitches may be needed if the wound is in an area that moves a lot, such as the hands, feet, and joints  Stitches may help to keep the wound from getting infected  They may also decrease the amount of scarring you have  Some wounds may heal better without stitches    Wound care:   · If your wound was closed with thin strips of medical tape, keep them clean and dry  The strips of medical tape will fall off on their own  Do not pull them off  · Keep the bandage clean and dry  Do not remove the bandage over your wound unless your healthcare provider says it is okay  · Wash your hands before and after you take care of your wound to prevent infection  · Clean the wound as directed  If you cannot reach the wound, have someone help you  · If you have packing, make sure all the gauze used to pack the wound is taken out and replaced as directed  Keep track of how many gauze dressings are placed inside the wound  Follow up with your healthcare provider as directed:  Write down your questions so you remember to ask them during your visits  © 2016 [address] is for End User's use only and may not be sold, redistributed or otherwise used for commercial purposes  All illustrations and images included in CareNotes® are the copyrighted property of [employer]  or [employer]yes  The above information is an  only  It is not intended as medical advice for individual conditions or treatments  Talk to your doctor, nurse or pharmacist before following any medical regimen to see if it is safe and effective for you

## 2018-09-18 DIAGNOSIS — I10 ESSENTIAL HYPERTENSION: Primary | ICD-10-CM

## 2018-09-19 RX ORDER — LISINOPRIL 20 MG/1
20 TABLET ORAL EVERY 24 HOURS
Qty: 90 TABLET | Refills: 0 | Status: SHIPPED | OUTPATIENT
Start: 2018-09-19 | End: 2019-02-21

## 2018-10-02 ENCOUNTER — APPOINTMENT (OUTPATIENT)
Dept: LAB | Facility: HOSPITAL | Age: 28
End: 2018-10-02
Payer: COMMERCIAL

## 2018-10-02 ENCOUNTER — TRANSCRIBE ORDERS (OUTPATIENT)
Dept: ADMINISTRATIVE | Facility: HOSPITAL | Age: 28
End: 2018-10-02

## 2018-10-02 DIAGNOSIS — G20 PARKINSON'S DISEASE (HCC): Primary | ICD-10-CM

## 2018-10-02 DIAGNOSIS — G20 PARKINSON'S DISEASE (HCC): ICD-10-CM

## 2018-10-02 PROCEDURE — 82390 ASSAY OF CERULOPLASMIN: CPT

## 2018-10-02 PROCEDURE — 82525 ASSAY OF COPPER: CPT

## 2018-10-02 PROCEDURE — 36415 COLL VENOUS BLD VENIPUNCTURE: CPT

## 2018-10-03 LAB — CERULOPLASMIN SERPL-MCNC: 22.1 MG/DL (ref 16–31)

## 2018-10-04 LAB — COPPER SERPL-MCNC: 86 UG/DL (ref 72–166)

## 2018-11-08 ENCOUNTER — APPOINTMENT (EMERGENCY)
Dept: CT IMAGING | Facility: HOSPITAL | Age: 28
End: 2018-11-08
Payer: COMMERCIAL

## 2018-11-08 ENCOUNTER — HOSPITAL ENCOUNTER (EMERGENCY)
Facility: HOSPITAL | Age: 28
Discharge: HOME | End: 2018-11-08
Attending: EMERGENCY MEDICINE | Admitting: EMERGENCY MEDICINE
Payer: COMMERCIAL

## 2018-11-08 VITALS
OXYGEN SATURATION: 98 % | WEIGHT: 170 LBS | BODY MASS INDEX: 26.63 KG/M2 | DIASTOLIC BLOOD PRESSURE: 88 MMHG | HEART RATE: 70 BPM | SYSTOLIC BLOOD PRESSURE: 161 MMHG | TEMPERATURE: 98.3 F | RESPIRATION RATE: 16 BRPM

## 2018-11-08 DIAGNOSIS — R51.9 HEADACHE: Primary | ICD-10-CM

## 2018-11-08 PROCEDURE — 70450 CT HEAD/BRAIN W/O DYE: CPT

## 2018-11-08 PROCEDURE — 72125 CT NECK SPINE W/O DYE: CPT

## 2018-11-08 PROCEDURE — 99283 EMERGENCY DEPT VISIT LOW MDM: CPT

## 2018-11-08 NOTE — DISCHARGE INSTRUCTIONS
Acute Headache, Ambulatory Care   GENERAL INFORMATION:   An acute headache  is pain or discomfort that starts suddenly and gets worse quickly  The cause of an acute headache may not be known  It may be triggered by stress, fatigue, hormones, food, or trauma  Common related symptoms include the following:   · Fever    · Sinus pressure    · Loss of memory    · Nausea or vomiting    · Problems with your vision, such as watery or red eyes, loss of vision, or pain in bright light    · Stiff neck    · Tenderness of the head and neck area    · Trouble staying awake, or being less alert than usual     · Weakness or less energy  Seek immediate care for the following symptoms:   · Severe pain    · A headache that occurs after a blow to the head, a fall, or other trauma     · Confusion or forgetfulness    · Numbness on one side of your face or body  Treatment for an acute headache  may include medicine to decrease pain  You may also need biofeedback or cognitive behavioral therapy  Ask your healthcare provider about these and other treatments for an acute headache  Manage my symptoms:   · Apply heat  on your head for 20 to 30 minutes every 2 hours for as many days as directed  Heat helps decrease pain and muscle spasms  You may alternate heat and ice  · Apply ice  on your head for 15 to 20 minutes every hour or as directed  Use an ice pack, or put crushed ice in a plastic bag  Cover it with a towel  Ice helps decrease pain  · Relax your muscles  Lie down in a comfortable position and close your eyes  Relax your muscles slowly  Start at your toes and work your way up your body  · Keep a record of your headaches  Write down when your headaches start and stop  Include your symptoms and what you were doing when the headache began  Record what you ate or drank for 24 hours before the headache started  Describe the pain and where it hurts  Keep track of what you did to treat your headache and whether it worked    Follow up with your healthcare provider as directed:  Bring your headache record with you when you see your healthcare provider  Write down your questions so you remember to ask them during your visits  CARE AGREEMENT:   You have the right to help plan your care  Learn about your health condition and how it may be treated  Discuss treatment options with your caregivers to decide what care you want to receive  You always have the right to refuse treatment  The above information is an  only  It is not intended as medical advice for individual conditions or treatments  Talk to your doctor, nurse or pharmacist before following any medical regimen to see if it is safe and effective for you  © 2014 [address] is for End User's use only and may not be sold, redistributed or otherwise used for commercial purposes  All illustrations and images included in CareNotes® are the copyrighted property of A [employer] M [employer]  or Andrew Reyes

## 2018-11-08 NOTE — ED PROVIDER NOTES
History  Chief Complaint   Patient presents with    Head Injury     Approximately one week ago, back of head struck a door frame  No LOC  Henrietta Nuñez is a 33 y o  male w PMH GERD, HTN, Parkinsons who presents for evaluation of headache  Pt reports headache since a mechanical fall where he fell backwards hitting his head against the wall a week ago  Since has had a headache, some nausea but no vomiting  He sees neurology at [facility] for parkinsons and actually reports having an appt today at 4pm  He has chronic headaches but current ha since his fall is worse  At baseline he does have some forgetfulness and slurred speech per neuro note  Gets occipital nerve blocks via neuro and has tried Yamilet Rocky both of which have helped his headache symptoms  Prior to Admission Medications   Prescriptions Last Dose Informant Patient Reported? Taking?   busPIRone (BUSPAR) 7 5 mg tablet  Self Yes No   Sig: Every 12 hours   ibuprofen (MOTRIN) 600 mg tablet  Self No No   Sig: Take 1 tablet (600 mg total) by mouth every 6 (six) hours as needed for mild pain or moderate pain   lisinopril (ZESTRIL) 20 mg tablet   No No   Sig: Take 1 tablet (20 mg total) by mouth every 24 hours   rotigotine (NEUPRO) 2 MG/24HR  Self Yes No   Sig: Place 1 patch on skin daily, then put 2 patches on daily  Facility-Administered Medications: None       Past Medical History:   Diagnosis Date    GERD (gastroesophageal reflux disease)     Hypertension     MRSA carrier     Parkinson disease ([facility] [address] )        Past Surgical History:   Procedure Laterality Date    NO PAST SURGERIES         History reviewed  No pertinent family history  I have reviewed and agree with the history as documented      Social History   Substance Use Topics    Smoking status: Former Smoker     Quit date: 2009    Smokeless tobacco: Never Used    Alcohol use No        Review of Systems   Constitutional: Negative for activity change, chills, diaphoresis, fatigue and fever    HENT: Negative for congestion and rhinorrhea  Eyes: Negative for pain  Respiratory: Negative for cough, chest tightness, shortness of breath and wheezing  Cardiovascular: Negative for chest pain and palpitations  Gastrointestinal: Positive for nausea  Negative for abdominal distention, constipation, diarrhea and vomiting  Genitourinary: Negative for difficulty urinating and dysuria  Musculoskeletal: Negative for arthralgias and myalgias  Neurological: Positive for headaches  Negative for dizziness, weakness and light-headedness  Psychiatric/Behavioral: The patient is not nervous/anxious  Physical Exam  Physical Exam   Constitutional: He is oriented to person, place, and time  He appears well-developed and well-nourished  No distress  HENT:   Head: Normocephalic and atraumatic  Eyes: Pupils are equal, round, and reactive to light  Neck: Normal range of motion  Neck supple  No tracheal deviation present  No midline neck pain    Cardiovascular: Normal rate, regular rhythm, normal heart sounds and intact distal pulses  Exam reveals no gallop and no friction rub  No murmur heard  Pulmonary/Chest: Effort normal and breath sounds normal  No respiratory distress  He has no wheezes  He has no rales  Abdominal: Soft  Bowel sounds are normal  He exhibits no distension and no mass  There is no tenderness  There is no guarding  Musculoskeletal: He exhibits no edema or deformity  Neurological: He is alert and oriented to person, place, and time  Speech is slowed and somewhat slurred  His strength is 5/5 in UE and LE but feels slightly weaker in the L leg w flexion and extension at the knee  There is normal intact sensation  Normal CN exam  Normal cerebellar function w finger to nose testing   Does have some baseline LLE weakness per neuro  Resting tremor of hands also baseline   Skin: Skin is warm and dry  He is not diaphoretic  Psychiatric: He has a normal mood and affect  His behavior is normal    Nursing note and vitals reviewed  Vital Signs  ED Triage Vitals [11/08/18 1308]   Temperature Pulse Respirations Blood Pressure SpO2   98 3 °F (36 8 °C) 70 16 161/88 98 %      Temp Source Heart Rate Source Patient Position - Orthostatic VS BP Location FiO2 (%)   Oral -- Sitting Left arm --      Pain Score       3           Vitals:    11/08/18 1308   BP: 161/88   Pulse: 70   Patient Position - Orthostatic VS: Sitting       Visual Acuity      ED Medications  Medications - No data to display    Diagnostic Studies  Results Reviewed     None                 CT head without contrast   Final Result by Kin Live MD (11/08 1403)      No acute intracranial hemorrhage seen   No mass effect or midline shift seen   Mild mucosal thickening seen in the ethmoidal air cells related to sinus disease                  Workstation performed: [id number]         CT spine cervical without contrast   Final Result by Kin Live MD (11/08 1400)      No acute compression collapse of the vertebra seen                   Workstation performed: [id number]                    Procedures  Procedures       Phone Contacts  ED Phone Contact    ED Course                               MDM  Number of Diagnoses or Management Options  Headache:   Diagnosis management comments: DDX includes but not ltd to:   Doubt TBI - consider given fall   Doubt cervical spine injury although cant clear by nexus given his abnormal strength     Plan is to obtain:  CT head to check for any acute intracranial abnormality/ ICH/ SAH/ SDH/ lesion/ mass/ CVA   CT C spine for fracture     Based on results:  Patient w normal CTH / CT c spine today  Follow up with your neurologist and go to your appointment today  Return parameters discussed  Pt requires f/u as an outpt  Pt expresses understanding w above treatment plan  All questions answered prior to d/c        CritCare Time    Disposition  Final diagnoses:   Headache     Time reflects when diagnosis was documented in both MDM as applicable and the Disposition within this note     Time User Action Codes Description Comment    11/8/2018  2:05 PM Sandra Madi Add [R51] Headache       ED Disposition     ED Disposition Condition Comment    Discharge  [facility] POINT discharge to home/self care  Condition at discharge: Good        Follow-up Information     Follow up With Specialties Details Why Contact Info Additional Information    [address] Emergency Department Emergency Medicine  If symptoms worsen [address] [facility]  [phone] [place] ED, [address] , Raven France MD Family Medicine  As needed [address]  [address]  [phone]           go to your neurologist appointment as scheduled this afternoon           Discharge Medication List as of 11/8/2018  2:06 PM      CONTINUE these medications which have NOT CHANGED    Details   busPIRone (BUSPAR) 7 5 mg tablet Every 12 hours, Starting Fri 6/16/2017, Historical Med      ibuprofen (MOTRIN) 600 mg tablet Take 1 tablet (600 mg total) by mouth every 6 (six) hours as needed for mild pain or moderate pain, Starting Thu 6/7/2018, Print      lisinopril (ZESTRIL) 20 mg tablet Take 1 tablet (20 mg total) by mouth every 24 hours, Starting Wed 9/19/2018, Normal      rotigotine (NEUPRO) 2 MG/24HR Place 1 patch on skin daily, then put 2 patches on daily  , Historical Med           No discharge procedures on file      ED Provider  Electronically Signed by           Silvio Lopez PA-C  11/08/18 4163

## 2018-12-30 ENCOUNTER — HOSPITAL ENCOUNTER (EMERGENCY)
Facility: HOSPITAL | Age: 28
Discharge: HOME | End: 2018-12-30
Attending: EMERGENCY MEDICINE | Admitting: EMERGENCY MEDICINE
Payer: COMMERCIAL

## 2018-12-30 ENCOUNTER — APPOINTMENT (EMERGENCY)
Dept: RADIOLOGY | Facility: HOSPITAL | Age: 28
End: 2018-12-30
Payer: COMMERCIAL

## 2018-12-30 VITALS
OXYGEN SATURATION: 99 % | RESPIRATION RATE: 18 BRPM | SYSTOLIC BLOOD PRESSURE: 163 MMHG | DIASTOLIC BLOOD PRESSURE: 96 MMHG | HEART RATE: 89 BPM

## 2018-12-30 DIAGNOSIS — R07.9 CHEST PAIN: Primary | ICD-10-CM

## 2018-12-30 DIAGNOSIS — R51.9 HEADACHE: ICD-10-CM

## 2018-12-30 LAB
ALBUMIN SERPL BCP-MCNC: 4.1 G/DL (ref 3.5–5)
ALP SERPL-CCNC: 52 U/L (ref 46–116)
ALT SERPL W P-5'-P-CCNC: 43 U/L (ref 12–78)
ANION GAP SERPL CALCULATED.3IONS-SCNC: 9 MMOL/L (ref 4–13)
AST SERPL W P-5'-P-CCNC: 17 U/L (ref 5–45)
ATRIAL RATE: 82 BPM
BASOPHILS # BLD AUTO: 0.07 THOUSANDS/ΜL (ref 0–0.1)
BASOPHILS NFR BLD AUTO: 1 % (ref 0–1)
BILIRUB SERPL-MCNC: 0.24 MG/DL (ref 0.2–1)
BUN SERPL-MCNC: 15 MG/DL (ref 5–25)
CALCIUM SERPL-MCNC: 8.8 MG/DL (ref 8.3–10.1)
CHLORIDE SERPL-SCNC: 102 MMOL/L (ref 100–108)
CO2 SERPL-SCNC: 26 MMOL/L (ref 21–32)
CREAT SERPL-MCNC: 0.88 MG/DL (ref 0.6–1.3)
EOSINOPHIL # BLD AUTO: 0.41 THOUSAND/ΜL (ref 0–0.61)
EOSINOPHIL NFR BLD AUTO: 6 % (ref 0–6)
ERYTHROCYTE [DISTWIDTH] IN BLOOD BY AUTOMATED COUNT: 11.9 % (ref 11.6–15.1)
GFR SERPL CREATININE-BSD FRML MDRD: 116 ML/MIN/1.73SQ M
GLUCOSE SERPL-MCNC: 106 MG/DL (ref 65–140)
HCT VFR BLD AUTO: 43.7 % (ref 36.5–49.3)
HGB BLD-MCNC: 15.5 G/DL (ref 12–17)
IMM GRANULOCYTES # BLD AUTO: 0.01 THOUSAND/UL (ref 0–0.2)
IMM GRANULOCYTES NFR BLD AUTO: 0 % (ref 0–2)
LYMPHOCYTES # BLD AUTO: 2.14 THOUSANDS/ΜL (ref 0.6–4.47)
LYMPHOCYTES NFR BLD AUTO: 34 % (ref 14–44)
MCH RBC QN AUTO: 32 PG (ref 26.8–34.3)
MCHC RBC AUTO-ENTMCNC: 35.5 G/DL (ref 31.4–37.4)
MCV RBC AUTO: 90 FL (ref 82–98)
MONOCYTES # BLD AUTO: 0.64 THOUSAND/ΜL (ref 0.17–1.22)
MONOCYTES NFR BLD AUTO: 10 % (ref 4–12)
NEUTROPHILS # BLD AUTO: 3.09 THOUSANDS/ΜL (ref 1.85–7.62)
NEUTS SEG NFR BLD AUTO: 49 % (ref 43–75)
NRBC BLD AUTO-RTO: 0 /100 WBCS
P AXIS: 75 DEGREES
PLATELET # BLD AUTO: 293 THOUSANDS/UL (ref 149–390)
PMV BLD AUTO: 9.3 FL (ref 8.9–12.7)
POTASSIUM SERPL-SCNC: 3.9 MMOL/L (ref 3.5–5.3)
PR INTERVAL: 152 MS
PROT SERPL-MCNC: 7.7 G/DL (ref 6.4–8.2)
QRS AXIS: 86 DEGREES
QRSD INTERVAL: 90 MS
QT INTERVAL: 342 MS
QTC INTERVAL: 399 MS
RBC # BLD AUTO: 4.84 MILLION/UL (ref 3.88–5.62)
SODIUM SERPL-SCNC: 137 MMOL/L (ref 136–145)
T WAVE AXIS: 25 DEGREES
VENTRICULAR RATE: 82 BPM
WBC # BLD AUTO: 6.36 THOUSAND/UL (ref 4.31–10.16)

## 2018-12-30 PROCEDURE — 71046 X-RAY EXAM CHEST 2 VIEWS: CPT

## 2018-12-30 PROCEDURE — 36415 COLL VENOUS BLD VENIPUNCTURE: CPT | Performed by: EMERGENCY MEDICINE

## 2018-12-30 PROCEDURE — 99285 EMERGENCY DEPT VISIT HI MDM: CPT

## 2018-12-30 PROCEDURE — 93005 ELECTROCARDIOGRAM TRACING: CPT

## 2018-12-30 PROCEDURE — 96372 THER/PROPH/DIAG INJ SC/IM: CPT

## 2018-12-30 PROCEDURE — 80053 COMPREHEN METABOLIC PANEL: CPT | Performed by: EMERGENCY MEDICINE

## 2018-12-30 PROCEDURE — 93010 ELECTROCARDIOGRAM REPORT: CPT | Performed by: INTERNAL MEDICINE

## 2018-12-30 PROCEDURE — 85025 COMPLETE CBC W/AUTO DIFF WBC: CPT | Performed by: EMERGENCY MEDICINE

## 2018-12-30 RX ORDER — KETOROLAC TROMETHAMINE 30 MG/ML
30 INJECTION, SOLUTION INTRAMUSCULAR; INTRAVENOUS ONCE
Status: COMPLETED | OUTPATIENT
Start: 2018-12-30 | End: 2018-12-30

## 2018-12-30 RX ORDER — NAPROXEN 500 MG/1
500 TABLET ORAL 2 TIMES DAILY WITH MEALS
Qty: 20 TABLET | Refills: 0 | Status: SHIPPED | OUTPATIENT
Start: 2018-12-30 | End: 2019-02-21

## 2018-12-30 RX ORDER — ONDANSETRON 4 MG/1
4 TABLET, ORALLY DISINTEGRATING ORAL EVERY 6 HOURS PRN
Qty: 20 TABLET | Refills: 0 | Status: SHIPPED | OUTPATIENT
Start: 2018-12-30 | End: 2019-02-21

## 2018-12-30 RX ADMIN — KETOROLAC TROMETHAMINE 30 MG: 30 INJECTION, SOLUTION INTRAMUSCULAR at 16:53

## 2018-12-30 NOTE — ED PROVIDER NOTES
History  Chief Complaint   Patient presents with    Chest Pain     CP for 3 weeks, also reports a cough and pain with deep inspiration, throat is "itchy" for two days  34 YO male presents with 3 weeks or Right sided chest pain  States this has been constant, aching and sharp, non-radiating  Pt states the pain is worse with deep breathing, he has some tenderness over the area  Hx of similar in the past  Pt denies chest injury  Pt denies fevers or chills, no nausea or vomiting  States he does have associated headache that has been intermittent  Pt has a Hx of Parkinson disease due to previous injury  He follows with neurology regularly  Pt denies SOB/F/C/N/V/D/C, no dysuria, burning on urination or blood in urine            History provided by:  Patient and spouse   used: No    Chest Pain   Pain location:  R chest  Pain quality: aching and sharp    Pain radiates to:  Does not radiate  Pain severity:  Moderate  Onset quality:  Gradual  Duration:  3 weeks  Timing:  Constant  Progression:  Waxing and waning  Chronicity:  Recurrent  Context: breathing and movement    Relieved by:  Nothing  Worsened by:  Deep breathing and movement  Associated symptoms: cough, headache and nausea    Associated symptoms: no abdominal pain, no anxiety, no back pain, no diaphoresis, no dizziness, no fatigue, no fever, no palpitations, no shortness of breath, not vomiting and no weakness    Cough:     Cough characteristics:  Non-productive    Severity:  Moderate    Onset quality:  Gradual    Duration:  1 week    Timing:  Intermittent    Progression:  Waxing and waning    Chronicity:  New  Headaches:     Severity:  Moderate    Onset quality:  Gradual    Timing:  Intermittent    Progression:  Waxing and waning    Chronicity:  Recurrent  Nausea:     Severity:  Moderate    Duration:  2 days    Timing:  Intermittent    Progression:  Waxing and waning      Prior to Admission Medications   Prescriptions Last Dose Informant Patient Reported? Taking?   busPIRone (BUSPAR) 7 5 mg tablet  Self Yes No   Sig: Every 12 hours   ibuprofen (MOTRIN) 600 mg tablet  Self No No   Sig: Take 1 tablet (600 mg total) by mouth every 6 (six) hours as needed for mild pain or moderate pain   lisinopril (ZESTRIL) 20 mg tablet   No No   Sig: Take 1 tablet (20 mg total) by mouth every 24 hours   rotigotine (NEUPRO) 2 MG/24HR  Self Yes No   Sig: Place 1 patch on skin daily, then put 2 patches on daily  Facility-Administered Medications: None       Past Medical History:   Diagnosis Date    GERD (gastroesophageal reflux disease)     Hypertension     MRSA carrier     Parkinson disease ([facility] [address] )        Past Surgical History:   Procedure Laterality Date    NO PAST SURGERIES         History reviewed  No pertinent family history  I have reviewed and agree with the history as documented  Social History   Substance Use Topics    Smoking status: Former Smoker     Quit date: 2009    Smokeless tobacco: Never Used    Alcohol use No        Review of Systems   Constitutional: Negative for diaphoresis, fatigue and fever  HENT: Negative for dental problem  Eyes: Negative for visual disturbance  Respiratory: Positive for cough  Negative for shortness of breath  Cardiovascular: Positive for chest pain  Negative for palpitations  Gastrointestinal: Positive for nausea  Negative for abdominal pain and vomiting  Genitourinary: Negative for dysuria and frequency  Musculoskeletal: Negative for back pain, neck pain and neck stiffness  Skin: Negative for rash  Neurological: Positive for headaches  Negative for dizziness, weakness and light-headedness  Psychiatric/Behavioral: Negative for agitation, behavioral problems and confusion  All other systems reviewed and are negative  Physical Exam  Physical Exam   Constitutional: He is oriented to person, place, and time  He appears well-developed and well-nourished     HENT:   Head: Normocephalic and atraumatic  Eyes: EOM are normal    Neck: Normal range of motion  Cardiovascular: Normal rate, regular rhythm and normal heart sounds  Pulmonary/Chest: Effort normal and breath sounds normal  He exhibits tenderness  Abdominal: Soft  Musculoskeletal: Normal range of motion  Neurological: He is alert and oriented to person, place, and time  Skin: Skin is warm and dry  Psychiatric: He has a normal mood and affect  His behavior is normal    Nursing note and vitals reviewed  Vital Signs  ED Triage Vitals [12/30/18 1521]   Temp Pulse Respirations Blood Pressure SpO2   -- 83 18 142/86 99 %      Temp src Heart Rate Source Patient Position - Orthostatic VS BP Location FiO2 (%)   -- Monitor Lying Left arm --      Pain Score       3           Vitals:    12/30/18 1521 12/30/18 1845   BP: 142/86 163/96   Pulse: 83 89   Patient Position - Orthostatic VS: Lying Standing       Visual Acuity      ED Medications  Medications   ketorolac (TORADOL) injection 30 mg (30 mg Intramuscular Given 12/30/18 1653)       Diagnostic Studies  Results Reviewed     Procedure Component Value Units Date/Time    Comprehensive metabolic panel [[id number]] Collected:  12/30/18 1659    Lab Status:  Final result Specimen:  Blood from Arm, Right Updated:  12/30/18 1724     Sodium 137 mmol/L      Potassium 3 9 mmol/L      Chloride 102 mmol/L      CO2 26 mmol/L      ANION GAP 9 mmol/L      BUN 15 mg/dL      Creatinine 0 88 mg/dL      Glucose 106 mg/dL      Calcium 8 8 mg/dL      AST 17 U/L      ALT 43 U/L      Alkaline Phosphatase 52 U/L      Total Protein 7 7 g/dL      Albumin 4 1 g/dL      Total Bilirubin 0 24 mg/dL      eGFR 116 ml/min/1 73sq m     Narrative:         National Kidney Disease Education Program recommendations are as follows:  GFR calculation is accurate only with a steady state creatinine  Chronic Kidney disease less than 60 ml/min/1 73 sq  meters  Kidney failure less than 15 ml/min/1 73 sq  meters      CBC and differential [[id number]] Collected:  12/30/18 1659    Lab Status:  Final result Specimen:  Blood from Arm, Right Updated:  12/30/18 1710     WBC 6 36 Thousand/uL      RBC 4 84 Million/uL      Hemoglobin 15 5 g/dL      Hematocrit 43 7 %      MCV 90 fL      MCH 32 0 pg      MCHC 35 5 g/dL      RDW 11 9 %      MPV 9 3 fL      Platelets 295 Thousands/uL      nRBC 0 /100 WBCs      Neutrophils Relative 49 %      Immat GRANS % 0 %      Lymphocytes Relative 34 %      Monocytes Relative 10 %      Eosinophils Relative 6 %      Basophils Relative 1 %      Neutrophils Absolute 3 09 Thousands/µL      Immature Grans Absolute 0 01 Thousand/uL      Lymphocytes Absolute 2 14 Thousands/µL      Monocytes Absolute 0 64 Thousand/µL      Eosinophils Absolute 0 41 Thousand/µL      Basophils Absolute 0 07 Thousands/µL                  XR chest 2 views   ED Interpretation by Marilee Zazueta MD (12/30 1269)   No PNA      Final Result by Jorge Galarza MD (12/31 4466)      No radiographic evidence of acute intrathoracic process or significant interval change  Findings concur with the preliminary report by the referring clinician already in PACS and/or our electronic record EPIC              Workstation performed: [id number]                    Procedures  ECG 12 Lead Documentation  Date/Time: 12/31/2018 5:44 PM  Performed by: Kimberli Carmona by: Miguel Angel Goldberg     ECG reviewed by me, the ED Provider: yes    Patient location:  ED  Interpretation:     Interpretation: normal    Rate:     ECG rate assessment: normal    Rhythm:     Rhythm: sinus rhythm    QRS:     QRS axis:  Normal    QRS intervals:  Normal  Conduction:     Conduction: normal    ST segments:     ST segments:  Normal  T waves:     T waves: normal             Phone Contacts  ED Phone Contact    ED Course                               MDM  Number of Diagnoses or Management Options  Chest pain: new and requires workup  Headache: new and requires workup  Diagnosis management comments: 1  Chest pain - Pt with HTN, no other cardiac risk factors, tender to palpation over the Right chest wall  Will check CXR to rule out PNA, electrolytes, CBC  Will give NSAID for analgesia  2  Headache - States no headache currently but this is occasionally persistent, will give Rx for Reglan  Instruct F/U with neurology  Pt to see them in ~10 days  Amount and/or Complexity of Data Reviewed  Clinical lab tests: ordered and reviewed  Tests in the radiology section of CPT®: ordered and reviewed  Obtain history from someone other than the patient: yes  Independent visualization of images, tracings, or specimens: yes    Patient Progress  Patient progress: stable    CritCare Time    Disposition  Final diagnoses:   Chest pain   Headache     Time reflects when diagnosis was documented in both MDM as applicable and the Disposition within this note     Time User Action Codes Description Comment    12/30/2018  6:29 PM Brandin Christine [R07 9] Chest pain     12/30/2018  6:29 PM Landy Vázquez [facility] Headache       ED Disposition     ED Disposition Condition Comment    Discharge  [facility] discharge to home/self care      Condition at discharge: Stable        Follow-up Information    None         Discharge Medication List as of 12/30/2018  6:33 PM      START taking these medications    Details   naproxen (NAPROSYN) 500 mg tablet Take 1 tablet (500 mg total) by mouth 2 (two) times a day with meals for 10 days, Starting Sun 12/30/2018, Until Wed 1/9/2019, Print      ondansetron (ZOFRAN-ODT) 4 mg disintegrating tablet Take 1 tablet (4 mg total) by mouth every 6 (six) hours as needed for nausea or vomiting, Starting Sun 12/30/2018, Print         CONTINUE these medications which have NOT CHANGED    Details   busPIRone (BUSPAR) 7 5 mg tablet Every 12 hours, Starting Fri 6/16/2017, Historical Med      ibuprofen (MOTRIN) 600 mg tablet Take 1 tablet (600 mg total) by mouth every 6 (six) hours as needed for mild pain or moderate pain, Starting Thu 6/7/2018, Print      lisinopril (ZESTRIL) 20 mg tablet Take 1 tablet (20 mg total) by mouth every 24 hours, Starting Wed 9/19/2018, Normal      rotigotine (NEUPRO) 2 MG/24HR Place 1 patch on skin daily, then put 2 patches on daily  , Historical Med           No discharge procedures on file      ED Provider  Electronically Signed by           Wade Edwards MD  12/31/18 4363

## 2018-12-30 NOTE — DISCHARGE INSTRUCTIONS
Take the naprosyn regularly for your discomfort  You should take this for the next 5-10 days  Try the Zofran to help treat the headache  Acute Headache, Ambulatory Care   GENERAL INFORMATION:   An acute headache  is pain or discomfort that starts suddenly and gets worse quickly  The cause of an acute headache may not be known  It may be triggered by stress, fatigue, hormones, food, or trauma  Common related symptoms include the following:   · Fever    · Sinus pressure    · Loss of memory    · Nausea or vomiting    · Problems with your vision, such as watery or red eyes, loss of vision, or pain in bright light    · Stiff neck    · Tenderness of the head and neck area    · Trouble staying awake, or being less alert than usual     · Weakness or less energy  Seek immediate care for the following symptoms:   · Severe pain    · A headache that occurs after a blow to the head, a fall, or other trauma     · Confusion or forgetfulness    · Numbness on one side of your face or body  Treatment for an acute headache  may include medicine to decrease pain  You may also need biofeedback or cognitive behavioral therapy  Ask your healthcare provider about these and other treatments for an acute headache  Manage my symptoms:   · Apply heat  on your head for 20 to 30 minutes every 2 hours for as many days as directed  Heat helps decrease pain and muscle spasms  You may alternate heat and ice  · Apply ice  on your head for 15 to 20 minutes every hour or as directed  Use an ice pack, or put crushed ice in a plastic bag  Cover it with a towel  Ice helps decrease pain  · Relax your muscles  Lie down in a comfortable position and close your eyes  Relax your muscles slowly  Start at your toes and work your way up your body  · Keep a record of your headaches  Write down when your headaches start and stop  Include your symptoms and what you were doing when the headache began   Record what you ate or drank for 24 hours before the headache started  Describe the pain and where it hurts  Keep track of what you did to treat your headache and whether it worked  Follow up with your healthcare provider as directed:  Bring your headache record with you when you see your healthcare provider  Write down your questions so you remember to ask them during your visits  CARE AGREEMENT:   You have the right to help plan your care  Learn about your health condition and how it may be treated  Discuss treatment options with your caregivers to decide what care you want to receive  You always have the right to refuse treatment  The above information is an  only  It is not intended as medical advice for individual conditions or treatments  Talk to your doctor, nurse or pharmacist before following any medical regimen to see if it is safe and effective for you  © 2014 [address] is for End User's use only and may not be sold, redistributed or otherwise used for commercial purposes  All illustrations and images included in CareNotes® are the copyrighted property of A D A M [employer]  or Andrew Reyes

## 2019-01-17 ENCOUNTER — OFFICE VISIT (OUTPATIENT)
Dept: OTOLARYNGOLOGY | Facility: CLINIC | Age: 29
End: 2019-01-17

## 2019-01-17 DIAGNOSIS — R07.0 THROAT PAIN: Primary | ICD-10-CM

## 2019-01-17 PROCEDURE — 99024 POSTOP FOLLOW-UP VISIT: CPT | Performed by: SPECIALIST

## 2019-01-17 NOTE — PROGRESS NOTES
Otolaryngology Head and Neck Surgery History and Physical    Chief complaint    Throat pain    History of the Present Illness    Henrietta Nuñez is a 26 y o  who presents with throat pain          Review of Systems    Past Medical History:   Diagnosis Date    GERD (gastroesophageal reflux disease)     Hypertension     MRSA carrier     Parkinson disease ([address] )        Past Surgical History:   Procedure Laterality Date    NO PAST SURGERIES         Social History     Social History    Marital status: Single     Spouse name: N/A    Number of children: N/A    Years of education: N/A     Occupational History    Not on file  Social History Main Topics    Smoking status: Former Smoker     Quit date: 2009    Smokeless tobacco: Never Used    Alcohol use No    Drug use: No    Sexual activity: Not on file     Other Topics Concern    Not on file     Social History Narrative    fhx not asked in triage       History reviewed  No pertinent family history  There were no vitals taken for this visit  Physical Exam      Procedure:      Imaging studies:      Pertinent laboratory data:      Assessment and plan:    1   Throat pain         Patient could not wait to be seen and left without being examined

## 2019-02-08 ENCOUNTER — APPOINTMENT (OUTPATIENT)
Dept: LAB | Facility: HOSPITAL | Age: 29
End: 2019-02-08
Payer: COMMERCIAL

## 2019-02-08 ENCOUNTER — HOSPITAL ENCOUNTER (OUTPATIENT)
Dept: RADIOLOGY | Facility: HOSPITAL | Age: 29
Discharge: HOME | End: 2019-02-08

## 2019-02-08 ENCOUNTER — OFFICE VISIT (OUTPATIENT)
Dept: FAMILY MEDICINE CLINIC | Facility: CLINIC | Age: 29
End: 2019-02-08

## 2019-02-08 VITALS
SYSTOLIC BLOOD PRESSURE: 146 MMHG | RESPIRATION RATE: 16 BRPM | OXYGEN SATURATION: 97 % | DIASTOLIC BLOOD PRESSURE: 100 MMHG | WEIGHT: 170 LBS | TEMPERATURE: 97.2 F | HEART RATE: 81 BPM | BODY MASS INDEX: 26.63 KG/M2

## 2019-02-08 DIAGNOSIS — R07.81 PLEURODYNIA: Primary | ICD-10-CM

## 2019-02-08 DIAGNOSIS — R07.81 PLEURODYNIA: ICD-10-CM

## 2019-02-08 PROCEDURE — 87205 SMEAR GRAM STAIN: CPT

## 2019-02-08 PROCEDURE — 99213 OFFICE O/P EST LOW 20 MIN: CPT | Performed by: FAMILY MEDICINE

## 2019-02-08 NOTE — PATIENT INSTRUCTIONS
Acute Cough   WHAT YOU NEED TO KNOW:   An acute cough can last up to 3 weeks  Common causes of an acute cough include a cold, allergies, or a lung infection  DISCHARGE INSTRUCTIONS:   Return to the emergency department if:   · You have trouble breathing or feel short of breath  · You cough up blood, or you see blood in your mucus  · You faint or feel weak or dizzy  · You have chest pain when you cough or take a deep breath  · You have new wheezing  Contact your healthcare provider if:   · You have a fever  · Your cough lasts longer than 4 weeks  · Your symptoms do not improve with treatment  · You have questions or concerns about your condition or care  Medicines:   · Medicines  may be needed to stop the cough, decrease swelling in your airways, or help open your airways  Medicine may also be given to help you cough up mucus  Ask your healthcare provider what over-the-counter medicines you can take  If you have an infection caused by bacteria, you may need antibiotics  · Take your medicine as directed  Contact your healthcare provider if you think your medicine is not helping or if you have side effects  Tell him or her if you are allergic to any medicine  Keep a list of the medicines, vitamins, and herbs you take  Include the amounts, and when and why you take them  Bring the list or the pill bottles to follow-up visits  Carry your medicine list with you in case of an emergency  Manage your symptoms:   · Do not smoke and stay away from others who smoke  Nicotine and other chemicals in cigarettes and cigars can cause lung damage and make your cough worse  Ask your healthcare provider for information if you currently smoke and need help to quit  E-cigarettes or smokeless tobacco still contain nicotine  Talk to your healthcare provider before you use these products  · Drink extra liquids as directed  Liquids will help thin and loosen mucus so you can cough it up   Liquids will also help prevent dehydration  Examples of good liquids to drink include water, fruit juice, and broth  Do not drink liquids that contain caffeine  Caffeine can increase your risk for dehydration  Ask your healthcare provider how much liquid to drink each day  · Rest as directed  Do not do activities that make your cough worse, such as exercise  · Use a humidifier or vaporizer  Use a cool mist humidifier or a vaporizer to increase air moisture in your home  This may make it easier for you to breathe and help decrease your cough  · Eat 2 to 5 mL of honey 2 times each day  Honey can help thin mucus and decrease your cough  · Use cough drops or lozenges  These can help decrease throat irritation and your cough  Follow up with your healthcare provider as directed:  Write down your questions so you remember to ask them during your visits  © 2017 [address] Information is for End User's use only and may not be sold, redistributed or otherwise used for commercial purposes  All illustrations and images included in CareNotes® are the copyrighted property of A [employer] M [employer] Inc  or Andrew Reyes  The above information is an  only  It is not intended as medical advice for individual conditions or treatments  Talk to your doctor, nurse or pharmacist before following any medical regimen to see if it is safe and effective for you

## 2019-02-09 NOTE — PROGRESS NOTES
Assessment/Plan:    Pleurodynia  Occupation exposure  Will get sputum culture and chest xray  Diagnoses and all orders for this visit:    Pleurodynia  -     Sputum culture and Gram stain; Future  -     XR chest pa & lateral; Future          Subjective:      Patient ID: Herby Dubin is a 26 y o  male  Cough: Patient complains of productive cough and chest pain  Symptoms began 2 months ago  The cough is without wheezing, dyspnea or hemoptysis, productive of green/yellow sputum and is aggravated by dust Associated symptoms include:chest pain  Patient does not have new pets  Patient does not have a history of asthma  Patient does have a history of environmental allergens  Patient does not have recent travel  Patient does not have a history of smoking  Patient  does not have previous Chest X-ray  Patient has not had a PPD done  States he was exposed to dust at work and began coughing up black sputum mid December  Was evaluated by ED, treated with Naproxyn and Solumedrol, and sent home  States he is improving  The following portions of the patient's history were reviewed and updated as appropriate: allergies, current medications, past family history, past medical history, past social history, past surgical history and problem list     Review of Systems   Constitutional: Negative for chills and fever  HENT: Negative for ear pain and sore throat  Eyes: Negative for pain and redness  Respiratory: Positive for cough  Negative for shortness of breath and wheezing  Cardiovascular: Positive for chest pain  Negative for palpitations and leg swelling  Gastrointestinal: Negative for abdominal pain, constipation, diarrhea, nausea and vomiting  Genitourinary: Negative for dysuria, frequency and hematuria  Musculoskeletal: Negative for arthralgias and myalgias  Neurological: Negative for dizziness and headaches  Psychiatric/Behavioral: Negative for dysphoric mood   The patient is not nervous/anxious  Objective:      /100 (BP Location: Left arm, Patient Position: Sitting, Cuff Size: Adult)   Pulse 81   Temp (!) 97 2 °F (36 2 °C) (Temporal)   Resp 16   Wt 77 1 kg (170 lb)   SpO2 97%   BMI 26 63 kg/m²          Physical Exam   Constitutional: He is oriented to person, place, and time  He appears well-developed and well-nourished  HENT:   Head: Normocephalic and atraumatic  Right Ear: External ear normal    Left Ear: External ear normal    Nose: Nose normal    Mouth/Throat: Oropharynx is clear and moist    Eyes: Pupils are equal, round, and reactive to light  Conjunctivae and EOM are normal    Neck: Normal range of motion  Neck supple  No JVD present  Cardiovascular: Normal rate, regular rhythm, normal heart sounds and intact distal pulses  Pulmonary/Chest: Effort normal and breath sounds normal  No respiratory distress  He has no wheezes  He has no rales  He exhibits no tenderness  Abdominal: Soft  Bowel sounds are normal  There is no tenderness  Musculoskeletal: Normal range of motion  He exhibits no edema or tenderness  Lymphadenopathy:     He has no cervical adenopathy  Neurological: He is alert and oriented to person, place, and time  bradykinesia   Skin: Skin is warm and dry  Psychiatric: He has a normal mood and affect   His behavior is normal

## 2019-02-13 ENCOUNTER — HOSPITAL ENCOUNTER (OUTPATIENT)
Dept: RADIOLOGY | Facility: HOSPITAL | Age: 29
Discharge: HOME | End: 2019-02-13
Payer: COMMERCIAL

## 2019-02-13 LAB
BACTERIA SPT RESP CULT: ABNORMAL
GRAM STN SPEC: ABNORMAL

## 2019-02-13 PROCEDURE — 71046 X-RAY EXAM CHEST 2 VIEWS: CPT

## 2019-02-18 ENCOUNTER — TELEPHONE (OUTPATIENT)
Dept: NEUROLOGY | Facility: CLINIC | Age: 29
End: 2019-02-18

## 2019-02-18 ENCOUNTER — TELEPHONE (OUTPATIENT)
Dept: FAMILY MEDICINE CLINIC | Facility: CLINIC | Age: 29
End: 2019-02-18

## 2019-02-18 NOTE — TELEPHONE ENCOUNTER
Called patient and let him know his CXR results were normal and his sputum results were equivocal, with likely oral contamination  He states his chest pain is work  Recommended returning to PCP for further evaluation  ER precautions were given

## 2019-02-21 ENCOUNTER — OFFICE VISIT (OUTPATIENT)
Dept: OTOLARYNGOLOGY | Facility: CLINIC | Age: 29
End: 2019-02-21
Payer: COMMERCIAL

## 2019-02-21 VITALS
HEIGHT: 67 IN | BODY MASS INDEX: 26.67 KG/M2 | WEIGHT: 169.9 LBS | HEART RATE: 102 BPM | SYSTOLIC BLOOD PRESSURE: 144 MMHG | DIASTOLIC BLOOD PRESSURE: 83 MMHG

## 2019-02-21 DIAGNOSIS — R13.13 PHARYNGEAL DYSPHAGIA: ICD-10-CM

## 2019-02-21 DIAGNOSIS — G20 PARKINSONS DISEASE (HCC): Primary | ICD-10-CM

## 2019-02-21 DIAGNOSIS — J30.1 SEASONAL ALLERGIC RHINITIS DUE TO POLLEN: ICD-10-CM

## 2019-02-21 PROCEDURE — 99204 OFFICE O/P NEW MOD 45 MIN: CPT | Performed by: SPECIALIST

## 2019-02-21 PROCEDURE — 31575 DIAGNOSTIC LARYNGOSCOPY: CPT | Performed by: SPECIALIST

## 2019-02-21 RX ORDER — FLUTICASONE PROPIONATE 50 MCG
1 SPRAY, SUSPENSION (ML) NASAL 2 TIMES DAILY
Qty: 1 BOTTLE | Refills: 5 | Status: SHIPPED | OUTPATIENT
Start: 2019-02-21 | End: 2019-08-30 | Stop reason: ALTCHOICE

## 2019-02-21 NOTE — PROGRESS NOTES
Otolaryngology Head and Neck Surgery History and Physical    Chief complaint    Chief Complaint   Patient presents with    Difficulty Swallowing        History of the Present Illness    Eleno Ramírez is a 26 y o  who presents with complaints of dysphagia secondary to Parkinson's  He was diagnosed in 2017  This time they would like to proceed with some extent of swallowing therapy for the patient  In order to do this the me to make sure there is no issues of any glottic or laryngeal pathology  Patient reports that he feels that his voice is softer but he has not had any complete loss of voice  No previous history of any laryngeal issues     Patient does complain of some chronic postnasal drip  He had taken some Flonase many years ago but that is not sure whether it helped him or not  He has had no other complaints of sinus infections or facial pain          Review of Systems    Past Medical History:   Diagnosis Date    GERD (gastroesophageal reflux disease)     Hypertension     MRSA carrier     Parkinson disease ([facility] [address] )        Past Surgical History:   Procedure Laterality Date    NO PAST SURGERIES         Social History     Socioeconomic History    Marital status: Single     Spouse name: Not on file    Number of children: Not on file    Years of education: Not on file    Highest education level: Not on file   Occupational History    Not on file   Social Needs    Financial resource strain: Not on file    Food insecurity:     Worry: Not on file     Inability: Not on file    Transportation needs:     Medical: Not on file     Non-medical: Not on file   Tobacco Use    Smoking status: Former Smoker     Last attempt to quit: 2009     Years since quitting: 10 1    Smokeless tobacco: Never Used   Substance and Sexual Activity    Alcohol use: No    Drug use: No    Sexual activity: Not on file   Lifestyle    Physical activity:     Days per week: Not on file     Minutes per session: Not on file    Stress: Not on file   Relationships    Social connections:     Talks on phone: Not on file     Gets together: Not on file     Attends [employer] service: Not on file     Active member of club or organization: Not on file     Attends meetings of clubs or organizations: Not on file     Relationship status: Not on file    Intimate partner violence:     Fear of current or ex partner: Not on file     Emotionally abused: Not on file     Physically abused: Not on file     Forced sexual activity: Not on file   Other Topics Concern    Not on file   Social History Narrative    fhx not asked in triage       History reviewed  No pertinent family history  /83   Pulse 102   Ht 5' 6 5" (1 689 m)   Wt 77 1 kg (169 lb 14 4 oz)   BMI 27 01 kg/m²       Current Outpatient Medications:     carbidopa-levodopa (SINEMET)  mg per tablet, Take 1 tablet by mouth, Disp: , Rfl:     ibuprofen (MOTRIN) 600 mg tablet, Take 1 tablet (600 mg total) by mouth every 6 (six) hours as needed for mild pain or moderate pain, Disp: 30 tablet, Rfl: 0    fluticasone (FLONASE) 50 mcg/act nasal spray, 1 spray into each nostril 2 (two) times a day for 181 days, Disp: 1 Bottle, Rfl: 5     Physical Exam   Constitutional: He is oriented to person, place, and time  He appears well-developed and well-nourished  HENT:   Head: Normocephalic and atraumatic  Right Ear: External ear normal  No drainage, swelling or tenderness  No mastoid tenderness  Tympanic membrane is not injected and not perforated  Tympanic membrane mobility is normal  No middle ear effusion  Left Ear: External ear normal  No drainage, swelling or tenderness  No mastoid tenderness  Tympanic membrane is not injected and not perforated  Tympanic membrane mobility is normal   No middle ear effusion  Nose: Nose normal  No mucosal edema, rhinorrhea, sinus tenderness, nasal deformity or septal deviation   Right sinus exhibits no maxillary sinus tenderness and no frontal sinus tenderness  Left sinus exhibits no maxillary sinus tenderness and no frontal sinus tenderness  Mouth/Throat: Uvula is midline and oropharynx is clear and moist  Mucous membranes are not pale and not dry  No oral lesions  Normal dentition  Eyes: Pupils are equal, round, and reactive to light  Conjunctivae and EOM are normal    Neck: Normal range of motion  Neck supple  No JVD present  No tracheal deviation present  No thyromegaly present  Cardiovascular:   Carotid normal   Jugular no distension   Pulmonary/Chest: No respiratory distress  Abdominal: Soft  He exhibits no distension  Musculoskeletal: Normal range of motion  Lymphadenopathy:     He has no cervical adenopathy  Neurological: He is alert and oriented to person, place, and time  No cranial nerve deficit  Skin: Skin is warm  No rash noted  No erythema  Psychiatric: He has a normal mood and affect  His behavior is normal  Thought content normal          Procedure:  Due to patient's complaints of dysphagia and concern about possible laryngeal issues prior to starting advance swallowing therapy flexible laryngoscopy was carried out to rule out any laryngeal pathology  Right nasal cavity spray with 4% xylocaine/Afrin combination spray  Unable to visualize nasopharynx and larynx with miror  Scope passed through the right nasal cavity  The nasopharynx, eustachian tubes, base of tongue, vallecula, epiglottis, hypopharynx and larynx normal with normal cord mobility  No evidence of any laryngeal abnormalities  No evidence of any pooled secretions in the piriform sinuses  Imaging studies:      Pertinent laboratory data:      Assessment and plan:    1  Parkinsons disease ([address] )     2  Pharyngeal dysphagia     3  Seasonal allergic rhinitis due to pollen  fluticasone (FLONASE) 50 mcg/act nasal spray       Patient with history of Parkinson's disease with significant dysphagia    Patient to undergo intensive speech therapy for swallowing dysfunction  No evidence of a laryngeal contraindication to proceed with therapy  Patient also with complaints of some chronic postnasal drip  He will try some Flonase 1 puff b i d  To see if that gives him any improvement

## 2019-02-27 PROCEDURE — PBDEN PB DENTAL DUMMY CHARGE: Performed by: DENTIST

## 2019-02-28 PROCEDURE — PBDEN PB DENTAL DUMMY CHARGE: Performed by: DENTIST

## 2019-06-11 ENCOUNTER — APPOINTMENT (EMERGENCY)
Dept: RADIOLOGY | Facility: HOSPITAL | Age: 29
End: 2019-06-11
Payer: COMMERCIAL

## 2019-06-11 ENCOUNTER — HOSPITAL ENCOUNTER (EMERGENCY)
Facility: HOSPITAL | Age: 29
Discharge: HOME | End: 2019-06-11
Attending: EMERGENCY MEDICINE | Admitting: EMERGENCY MEDICINE
Payer: COMMERCIAL

## 2019-06-11 VITALS
WEIGHT: 172.4 LBS | TEMPERATURE: 97.6 F | HEART RATE: 78 BPM | BODY MASS INDEX: 27.41 KG/M2 | OXYGEN SATURATION: 98 % | SYSTOLIC BLOOD PRESSURE: 148 MMHG | DIASTOLIC BLOOD PRESSURE: 91 MMHG | RESPIRATION RATE: 16 BRPM

## 2019-06-11 DIAGNOSIS — R07.89 ATYPICAL CHEST PAIN: Primary | ICD-10-CM

## 2019-06-11 LAB
ANION GAP SERPL CALCULATED.3IONS-SCNC: 10 MMOL/L (ref 4–13)
ATRIAL RATE: 67 BPM
BASOPHILS # BLD AUTO: 0.05 THOUSANDS/ΜL (ref 0–0.1)
BASOPHILS NFR BLD AUTO: 1 % (ref 0–1)
BUN SERPL-MCNC: 13 MG/DL (ref 5–25)
CALCIUM SERPL-MCNC: 9.2 MG/DL (ref 8.3–10.1)
CHLORIDE SERPL-SCNC: 104 MMOL/L (ref 100–108)
CO2 SERPL-SCNC: 28 MMOL/L (ref 21–32)
CREAT SERPL-MCNC: 0.71 MG/DL (ref 0.6–1.3)
DEPRECATED D DIMER PPP: 279 NG/ML (FEU)
EOSINOPHIL # BLD AUTO: 0.24 THOUSAND/ΜL (ref 0–0.61)
EOSINOPHIL NFR BLD AUTO: 5 % (ref 0–6)
ERYTHROCYTE [DISTWIDTH] IN BLOOD BY AUTOMATED COUNT: 12.4 % (ref 11.6–15.1)
GFR SERPL CREATININE-BSD FRML MDRD: 126 ML/MIN/1.73SQ M
GLUCOSE SERPL-MCNC: 80 MG/DL (ref 65–140)
HCT VFR BLD AUTO: 45.4 % (ref 36.5–49.3)
HGB BLD-MCNC: 15.9 G/DL (ref 12–17)
IMM GRANULOCYTES # BLD AUTO: 0.01 THOUSAND/UL (ref 0–0.2)
IMM GRANULOCYTES NFR BLD AUTO: 0 % (ref 0–2)
LYMPHOCYTES # BLD AUTO: 1.92 THOUSANDS/ΜL (ref 0.6–4.47)
LYMPHOCYTES NFR BLD AUTO: 38 % (ref 14–44)
MCH RBC QN AUTO: 32.8 PG (ref 26.8–34.3)
MCHC RBC AUTO-ENTMCNC: 35 G/DL (ref 31.4–37.4)
MCV RBC AUTO: 94 FL (ref 82–98)
MONOCYTES # BLD AUTO: 0.4 THOUSAND/ΜL (ref 0.17–1.22)
MONOCYTES NFR BLD AUTO: 8 % (ref 4–12)
NEUTROPHILS # BLD AUTO: 2.5 THOUSANDS/ΜL (ref 1.85–7.62)
NEUTS SEG NFR BLD AUTO: 48 % (ref 43–75)
NRBC BLD AUTO-RTO: 0 /100 WBCS
P AXIS: 79 DEGREES
PLATELET # BLD AUTO: 291 THOUSANDS/UL (ref 149–390)
PMV BLD AUTO: 10.1 FL (ref 8.9–12.7)
POTASSIUM SERPL-SCNC: 4.2 MMOL/L (ref 3.5–5.3)
PR INTERVAL: 130 MS
QRS AXIS: 84 DEGREES
QRSD INTERVAL: 90 MS
QT INTERVAL: 372 MS
QTC INTERVAL: 393 MS
RBC # BLD AUTO: 4.85 MILLION/UL (ref 3.88–5.62)
SODIUM SERPL-SCNC: 142 MMOL/L (ref 136–145)
T WAVE AXIS: 53 DEGREES
TROPONIN I SERPL-MCNC: <0.02 NG/ML
VENTRICULAR RATE: 67 BPM
WBC # BLD AUTO: 5.12 THOUSAND/UL (ref 4.31–10.16)

## 2019-06-11 PROCEDURE — 85379 FIBRIN DEGRADATION QUANT: CPT | Performed by: PHYSICIAN ASSISTANT

## 2019-06-11 PROCEDURE — 99285 EMERGENCY DEPT VISIT HI MDM: CPT

## 2019-06-11 PROCEDURE — 93010 ELECTROCARDIOGRAM REPORT: CPT | Performed by: INTERNAL MEDICINE

## 2019-06-11 PROCEDURE — 71046 X-RAY EXAM CHEST 2 VIEWS: CPT

## 2019-06-11 PROCEDURE — 93005 ELECTROCARDIOGRAM TRACING: CPT

## 2019-06-11 PROCEDURE — 85025 COMPLETE CBC W/AUTO DIFF WBC: CPT | Performed by: PHYSICIAN ASSISTANT

## 2019-06-11 PROCEDURE — 80048 BASIC METABOLIC PNL TOTAL CA: CPT | Performed by: PHYSICIAN ASSISTANT

## 2019-06-11 PROCEDURE — 36415 COLL VENOUS BLD VENIPUNCTURE: CPT | Performed by: PHYSICIAN ASSISTANT

## 2019-06-11 PROCEDURE — 99284 EMERGENCY DEPT VISIT MOD MDM: CPT | Performed by: PHYSICIAN ASSISTANT

## 2019-06-11 PROCEDURE — 84484 ASSAY OF TROPONIN QUANT: CPT | Performed by: PHYSICIAN ASSISTANT

## 2019-06-11 RX ORDER — IBUPROFEN 600 MG/1
600 TABLET ORAL EVERY 6 HOURS PRN
Qty: 30 TABLET | Refills: 0 | Status: SHIPPED | OUTPATIENT
Start: 2019-06-11 | End: 2019-08-30 | Stop reason: SDUPTHER

## 2019-06-11 RX ORDER — LIDOCAINE 50 MG/G
1 PATCH TOPICAL DAILY
Qty: 30 PATCH | Refills: 0 | Status: SHIPPED | OUTPATIENT
Start: 2019-06-11 | End: 2019-08-30 | Stop reason: ALTCHOICE

## 2019-08-05 ENCOUNTER — HOSPITAL ENCOUNTER (EMERGENCY)
Facility: HOSPITAL | Age: 29
Discharge: HOME | End: 2019-08-05
Attending: EMERGENCY MEDICINE | Admitting: EMERGENCY MEDICINE
Payer: COMMERCIAL

## 2019-08-05 ENCOUNTER — APPOINTMENT (EMERGENCY)
Dept: CT IMAGING | Facility: HOSPITAL | Age: 29
End: 2019-08-05
Payer: COMMERCIAL

## 2019-08-05 ENCOUNTER — APPOINTMENT (EMERGENCY)
Dept: RADIOLOGY | Facility: HOSPITAL | Age: 29
End: 2019-08-05
Payer: COMMERCIAL

## 2019-08-05 VITALS
TEMPERATURE: 98.6 F | RESPIRATION RATE: 18 BRPM | DIASTOLIC BLOOD PRESSURE: 56 MMHG | HEART RATE: 104 BPM | OXYGEN SATURATION: 97 % | SYSTOLIC BLOOD PRESSURE: 110 MMHG

## 2019-08-05 DIAGNOSIS — J34.89 RHINORRHEA: Primary | ICD-10-CM

## 2019-08-05 DIAGNOSIS — M79.604 LEG PAIN, ANTERIOR, RIGHT: ICD-10-CM

## 2019-08-05 PROCEDURE — 99284 EMERGENCY DEPT VISIT MOD MDM: CPT | Performed by: EMERGENCY MEDICINE

## 2019-08-05 PROCEDURE — 99284 EMERGENCY DEPT VISIT MOD MDM: CPT

## 2019-08-05 PROCEDURE — 70450 CT HEAD/BRAIN W/O DYE: CPT

## 2019-08-05 PROCEDURE — 73552 X-RAY EXAM OF FEMUR 2/>: CPT

## 2019-08-05 PROCEDURE — 70486 CT MAXILLOFACIAL W/O DYE: CPT

## 2019-08-05 NOTE — DISCHARGE INSTRUCTIONS
Leg Pain   WHAT YOU NEED TO KNOW:   Leg pain may be caused by a variety of health conditions  Your tests did not show any broken bones or blood clots  DISCHARGE INSTRUCTIONS:   Return to the emergency department if:   · You have a fever  · Your leg starts to swell  · Your leg pain gets worse  · You have numbness or tingling in your leg or toes  · You cannot put any weight on or move your leg  Contact your healthcare provider if:   · Your pain does not decrease, even after treatment  · You have questions or concerns about your condition or care  Medicines:   · NSAIDs , such as ibuprofen, help decrease swelling, pain, and fever  This medicine is available with or without a doctor's order  NSAIDs can cause stomach bleeding or kidney problems in certain people  If you take blood thinner medicine, always ask your healthcare provider if NSAIDs are safe for you  Always read the medicine label and follow directions  · Take your medicine as directed  Contact your healthcare provider if you think your medicine is not helping or if you have side effects  Tell him of her if you are allergic to any medicine  Keep a list of the medicines, vitamins, and herbs you take  Include the amounts, and when and why you take them  Bring the list or the pill bottles to follow-up visits  Carry your medicine list with you in case of an emergency  Follow up with your healthcare provider as directed: You may need more tests to find the cause of your leg pain  You may need to see an orthopedic specialist or a physical therapist  Write down your questions so you remember to ask them during your visits  Manage your leg pain:   · Rest  your injured leg so that it can heal  You may need an immobilizer, brace, or splint to limit the movement of your leg  You may need to avoid putting any weight on your leg for at least 48 hours  Return to normal activities as directed      · Ice  the injury for 20 minutes every 4 hours for up to 24 hours, or as directed  Use an ice pack, or put crushed ice in a plastic bag  Cover it with a towel to protect your skin  Ice helps prevent tissue damage and decreases swelling and pain  · Elevate  your injured leg above the level of your heart as often as you can  This will help decrease swelling and pain  If possible, prop your leg on pillows or blankets to keep the area elevated comfortably  · Use assistive devices as directed  You may need to use a cane or crutches  Assistive devices help decrease pain and pressure on your leg when you walk  Ask your healthcare provider for more information about assistive devices and how to use them correctly  · Maintain a healthy weight  Extra body weight can cause pressure and pain in your hip, knee, and ankle joints  Ask your healthcare provider how much you should weigh  Ask him to help you create a weight loss plan if you are overweight  © 2017 [employer] Information is for End User's use only and may not be sold, redistributed or otherwise used for commercial purposes  All illustrations and images included in CareNotes® are the copyrighted property of [employer]  or Andrew Reyes  The above information is an  only  It is not intended as medical advice for individual conditions or treatments  Talk to your doctor, nurse or pharmacist before following any medical regimen to see if it is safe and effective for you

## 2019-08-05 NOTE — ED PROVIDER NOTES
History  Chief Complaint   Patient presents with    Headache     Pt reports intermittent headaches for the past few days   pt states prior to arrival he had a large clear fluid com from his nose and he believes it is spinal fluid  Pt has hx of parkinsons and states his gait has been getting worse as well      57-year-old male with a history of Parkinson's disease secondary to repetitive head trauma presents to the emergency department with a gush of clear fluid from his right nostril  He states that he felt like there was fluid in his nostril and blew his nose very hard and then a large gush of fluid came from his nose  He was concerned possibility of spinal fluid  He states he has a headache but not any different than his usual headaches  His 2nd complaint is that of atraumatic right thigh pain  He does walk with a cane and puts much of his weight on his right leg  He feels that his leg bulges out to the side  History provided by:  Patient   used: No    Medical Problem   Severity:  Unable to specify  Onset quality:  Sudden  Progression:  Resolved  Chronicity:  New  Context:  Large amount of clear fluid from right nostril  Associated symptoms: headaches and rhinorrhea    Associated symptoms: no abdominal pain, no chest pain, no congestion, no cough, no diarrhea, no fatigue, no fever, no loss of consciousness, no myalgias, no nausea, no rash, no shortness of breath, no sore throat and no vomiting        Prior to Admission Medications   Prescriptions Last Dose Informant Patient Reported? Taking?    TURMERIC CURCUMIN PO   Yes No   Sig: Take by mouth daily   carbidopa-levodopa (SINEMET)  mg per tablet   Yes No   Sig: Take 1 tablet by mouth   fluticasone (FLONASE) 50 mcg/act nasal spray   No No   Si spray into each nostril 2 (two) times a day for 181 days   Patient not taking: Reported on 2019   ibuprofen (MOTRIN) 600 mg tablet  Self No No   Sig: Take 1 tablet (600 mg total) by mouth every 6 (six) hours as needed for mild pain or moderate pain   ibuprofen (MOTRIN) 600 mg tablet   No No   Sig: Take 1 tablet (600 mg total) by mouth every 6 (six) hours as needed for mild pain or moderate pain   lidocaine (LIDODERM) 5 %   No No   Sig: Apply 1 patch topically daily Remove & Discard patch within 12 hours or as directed by MD      Facility-Administered Medications: None       Past Medical History:   Diagnosis Date    GERD (gastroesophageal reflux disease)     Hypertension     MRSA carrier     Parkinson disease ([facility] [address] )        Past Surgical History:   Procedure Laterality Date    NO PAST SURGERIES         History reviewed  No pertinent family history  I have reviewed and agree with the history as documented  Social History     Tobacco Use    Smoking status: Former Smoker     Last attempt to quit: 2009     Years since quitting: 10 5    Smokeless tobacco: Never Used   Substance Use Topics    Alcohol use: No    Drug use: Yes     Types: Marijuana     Comment: It helps with my Parkinson "        Review of Systems   Constitutional: Negative  Negative for fatigue and fever  HENT: Positive for rhinorrhea  Negative for congestion, nosebleeds, sinus pressure and sore throat  Eyes: Negative  Respiratory: Negative  Negative for cough and shortness of breath  Cardiovascular: Negative  Negative for chest pain  Gastrointestinal: Negative  Negative for abdominal pain, diarrhea, nausea and vomiting  Genitourinary: Negative  Musculoskeletal: Negative for myalgias and neck pain  Skin: Negative  Negative for rash  Allergic/Immunologic: Negative  Neurological: Positive for headaches  Negative for dizziness, tremors, seizures, loss of consciousness, syncope, facial asymmetry, speech difficulty, weakness, light-headedness and numbness  Hematological: Negative  Psychiatric/Behavioral: Negative  All other systems reviewed and are negative        Physical Exam  Physical Exam   Constitutional: He is oriented to person, place, and time  He appears well-developed and well-nourished  Non-toxic appearance  He does not have a sickly appearance  He does not appear ill  No distress  HENT:   Head: Normocephalic and atraumatic  Right Ear: Tympanic membrane and external ear normal    Left Ear: Tympanic membrane and external ear normal    Nose: No mucosal edema, rhinorrhea or nasal deformity  No epistaxis  No foreign bodies  Right sinus exhibits no maxillary sinus tenderness and no frontal sinus tenderness  Left sinus exhibits no maxillary sinus tenderness and no frontal sinus tenderness  Mouth/Throat: Oropharynx is clear and moist    Eyes: Pupils are equal, round, and reactive to light  Conjunctivae and EOM are normal    Neck: Normal range of motion  Neck supple  No JVD present  No thyromegaly present  Cardiovascular: Normal rate, regular rhythm and normal heart sounds  Pulmonary/Chest: Effort normal and breath sounds normal    Abdominal: Soft  Bowel sounds are normal  He exhibits no distension and no mass  There is no tenderness  No hernia  Musculoskeletal: Normal range of motion  He exhibits no edema or deformity  Right upper leg: He exhibits tenderness  He exhibits no bony tenderness, no swelling, no edema, no deformity and no laceration  Legs:  Lymphadenopathy:     He has no cervical adenopathy  Neurological: He is alert and oriented to person, place, and time  He has normal strength and normal reflexes  He exhibits normal muscle tone  Skin: Skin is warm and dry  No rash noted  He is not diaphoretic  No erythema  No pallor  Psychiatric: He has a normal mood and affect  Nursing note and vitals reviewed        Vital Signs  ED Triage Vitals   Temperature Pulse Respirations Blood Pressure SpO2   08/05/19 1703 08/05/19 1703 08/05/19 1703 08/05/19 1703 08/05/19 1703   98 6 °F (37 °C) 104 18 110/56 97 %      Temp Source Heart Rate Source Patient Position - Orthostatic VS BP Location FiO2 (%)   08/05/19 1703 08/05/19 1703 08/05/19 1703 08/05/19 1703 --   Temporal Monitor Sitting Right arm       Pain Score       08/05/19 1731       3           Vitals:    08/05/19 1703   BP: 110/56   Pulse: 104   Patient Position - Orthostatic VS: Sitting         Visual Acuity      ED Medications  Medications - No data to display    Diagnostic Studies  Results Reviewed     None                 XR femur 2 views RIGHT   Final Result by Bora Garduno MD (08/05 1801)      No acute osseous abnormality  Workstation performed: [id number]         CT head without contrast   Final Result by Shanna Vora MD (08/05 1803)      No acute intracranial abnormality  Workstation performed: [id number]         CT facial bones without contrast   Final Result by Shanna Vora MD (08/05 1807)      No acute facial bone fracture or subluxation  Workstation performed: [id number]                    Procedures  Procedures       ED Course                               MDM  Number of Diagnoses or Management Options  Diagnosis management comments: 56-year-old male with a history of Parkinson's presents with what he believes may be cerebral spinal fluid from his right nostril  He states he felt like there was fluid there and he blue is nose really hard and a large amount of fluid came out  He denies other trauma  No worsening headaches  He also complains of right thigh pain that is atraumatic  On exam he appears well in no distress  No sign of active leakage from the right nostril  He does have tenderness to the right thigh  Will CT head and facial bones to rule out the possibility of cribriform plate fracture but otherwise it would be highly unlikely to have a CSF leak    Will also x-ray right femur       Amount and/or Complexity of Data Reviewed  Tests in the radiology section of CPT®: ordered and reviewed        Disposition  Final diagnoses:   Rhinorrhea   Leg pain, anterior, right     Time reflects when diagnosis was documented in both MDM as applicable and the Disposition within this note     Time User Action Codes Description Comment    8/5/2019  6:15 PM Rochelle Nikolas Add [J34 [id number]] Rhinorrhea     8/5/2019  6:16 PM Chris SHIPMAN Add [R79 [id number]] Leg pain, anterior, right       ED Disposition     ED Disposition Condition Date/Time Comment    Discharge Good Mon Aug 5, 2019  6:15 PM Desmond Natarajan discharge to home/self care  Follow-up Information     Follow up With Specialties Details Why Contact Info        Follow-up with your family doctor this week if your symptoms persist          Patient's Medications   Discharge Prescriptions    No medications on file     No discharge procedures on file      ED Provider  Electronically Signed by           Ab Haney DO  08/05/19 8811

## 2019-08-08 ENCOUNTER — TELEPHONE (OUTPATIENT)
Dept: FAMILY MEDICINE CLINIC | Facility: CLINIC | Age: 29
End: 2019-08-08

## 2019-08-30 ENCOUNTER — APPOINTMENT (OUTPATIENT)
Dept: LAB | Facility: CLINIC | Age: 29
End: 2019-08-30
Payer: COMMERCIAL

## 2019-08-30 ENCOUNTER — HOSPITAL ENCOUNTER (OUTPATIENT)
Dept: RADIOLOGY | Facility: HOSPITAL | Age: 29
Discharge: HOME | End: 2019-08-30
Payer: COMMERCIAL

## 2019-08-30 ENCOUNTER — OFFICE VISIT (OUTPATIENT)
Dept: FAMILY MEDICINE CLINIC | Facility: CLINIC | Age: 29
End: 2019-08-30

## 2019-08-30 VITALS
SYSTOLIC BLOOD PRESSURE: 140 MMHG | OXYGEN SATURATION: 98 % | BODY MASS INDEX: 26.03 KG/M2 | WEIGHT: 162 LBS | TEMPERATURE: 96 F | HEIGHT: 66 IN | RESPIRATION RATE: 20 BRPM | DIASTOLIC BLOOD PRESSURE: 84 MMHG | HEART RATE: 88 BPM

## 2019-08-30 DIAGNOSIS — M79.604 RIGHT LEG PAIN: ICD-10-CM

## 2019-08-30 DIAGNOSIS — G89.29 CHRONIC RIGHT HIP PAIN: ICD-10-CM

## 2019-08-30 DIAGNOSIS — G89.29 CHRONIC RIGHT HIP PAIN: Primary | ICD-10-CM

## 2019-08-30 DIAGNOSIS — M25.551 CHRONIC RIGHT HIP PAIN: ICD-10-CM

## 2019-08-30 DIAGNOSIS — M25.551 CHRONIC RIGHT HIP PAIN: Primary | ICD-10-CM

## 2019-08-30 PROBLEM — I10 ESSENTIAL HYPERTENSION: Status: ACTIVE | Noted: 2018-03-15

## 2019-08-30 PROBLEM — G20.A1 PARKINSON'S SYNDROME: Status: ACTIVE | Noted: 2017-05-15

## 2019-08-30 PROBLEM — G20 PARKINSON'S SYNDROME (HCC): Status: ACTIVE | Noted: 2017-05-15

## 2019-08-30 PROBLEM — G43.719 INTRACTABLE CHRONIC MIGRAINE WITHOUT AURA: Status: ACTIVE | Noted: 2017-07-17

## 2019-08-30 LAB
CA-I BLD-SCNC: 1.19 MMOL/L (ref 1.12–1.32)
CALCIUM SERPL-MCNC: 9.2 MG/DL (ref 8.3–10.1)
EST. AVERAGE GLUCOSE BLD GHB EST-MCNC: 97 MG/DL
HBA1C MFR BLD: 5 % (ref 4.2–6.3)

## 2019-08-30 PROCEDURE — 99214 OFFICE O/P EST MOD 30 MIN: CPT | Performed by: FAMILY MEDICINE

## 2019-08-30 PROCEDURE — 3008F BODY MASS INDEX DOCD: CPT | Performed by: FAMILY MEDICINE

## 2019-08-30 PROCEDURE — 82330 ASSAY OF CALCIUM: CPT

## 2019-08-30 PROCEDURE — 73502 X-RAY EXAM HIP UNI 2-3 VIEWS: CPT

## 2019-08-30 PROCEDURE — 82310 ASSAY OF CALCIUM: CPT

## 2019-08-30 PROCEDURE — 83036 HEMOGLOBIN GLYCOSYLATED A1C: CPT

## 2019-08-30 PROCEDURE — 82306 VITAMIN D 25 HYDROXY: CPT

## 2019-08-30 PROCEDURE — 36415 COLL VENOUS BLD VENIPUNCTURE: CPT

## 2019-08-30 RX ORDER — ACETAMINOPHEN 325 MG/1
650 TABLET ORAL EVERY 6 HOURS PRN
COMMUNITY

## 2019-08-30 RX ORDER — OMEGA-3S/DHA/EPA/FISH OIL 300-1000MG
2 CAPSULE ORAL DAILY
COMMUNITY

## 2019-08-30 NOTE — PROGRESS NOTES
Assessment/Plan:    Right leg pain  Unclear etiology  Working differential include right trochanteric bursitis, gluteal entrapment syndrome, piriformis syndrome, vs muscle strain and stiffness from parkinson's disease  Patient currently on Sinemet for his Parkinson's and follows closely with [facility] neurology  At this time, although patient requests it, there is no indication for a DEXA scan  Will obtain Calcium, Vitamin D and x-ray of right hip  Referral to PT sent  May take Ibuprofen 600 mg q 8 hours scheduled for the next five days  Advised to take ibuprofen with food  F/u in 6 weeks to re-evaluate  Diagnoses and all orders for this visit:    Chronic right hip pain  -     XR hip/pelv 2-3 vws right if performed; Future  -     Calcium, ionized; Future  -     Calcium; Future  -     Vitamin D Panel; Future  -     Ambulatory referral to Physical Therapy; Future  -     HEMOGLOBIN A1C W/ EAG ESTIMATION; Future    Right leg pain    Other orders  -     acetaminophen (TYLENOL) 325 mg tablet; Take 650 mg by mouth every 6 (six) hours as needed  -     Omega-3 Fatty Acids (OMEGA-3 FISH OIL) 300 MG CAPS; Take 2 g by mouth daily  -     carbidopa-levodopa (SINEMET)  mg per tablet; Take 1 5 tablets by mouth Three times a day          Subjective:      Patient ID: Kiana Fierro is a 26 y o  male  Kiana Fierro is a 26 y o  Here foer evaluation of right leg pain  There is no back pain  Patient does have parkinson's disease and is no sinemet, however he has full body stiffness  He denied any recent falls  Leg Pain    The incident occurred more than 1 week ago (four months)  Incident location: no incident  There was no injury mechanism  The pain is present in the right hip, right ankle, right foot, left hip and right knee  The quality of the pain is described as burning and cramping  The pain is at a severity of 7/10  The pain is severe  The pain has been constant since onset   Associated symptoms include a loss of motion and numbness  Pertinent negatives include no inability to bear weight, loss of sensation, muscle weakness or tingling  He reports no foreign bodies present  The symptoms are aggravated by movement, palpation and weight bearing  He has tried NSAIDs and acetaminophen for the symptoms  The treatment provided no relief  The following portions of the patient's history were reviewed and updated as appropriate:   He  has a past medical history of GERD (gastroesophageal reflux disease), Hypertension, MRSA carrier, and Parkinson disease ([facility] 75 )  He   Patient Active Problem List    Diagnosis Date Noted    Chronic right hip pain 08/30/2019    Right leg pain 08/30/2019    Pleurodynia 02/08/2019    Mallet finger of right hand 06/20/2018    Essential hypertension 03/15/2018    Intractable chronic migraine without aura 07/17/2017    [facility] 05/15/2017    Tremor of left hand 08/31/2016    Human papilloma virus (HPV) infection 02/10/2014    Postconcussion syndrome 02/10/2014     He  has a past surgical history that includes No past surgeries  His family history is not on file  He  reports that he quit smoking about 10 years ago  He has never used smokeless tobacco  He reports that he has current or past drug history  Drug: Marijuana  He reports that he does not drink alcohol  Current Outpatient Medications   Medication Sig Dispense Refill    carbidopa-levodopa (SINEMET)  mg per tablet Take 1 5 tablets by mouth Three times a day      ibuprofen (MOTRIN) 600 mg tablet Take 1 tablet (600 mg total) by mouth every 6 (six) hours as needed for mild pain or moderate pain 30 tablet 0    acetaminophen (TYLENOL) 325 mg tablet Take 650 mg by mouth every 6 (six) hours as needed      Omega-3 Fatty Acids (OMEGA-3 FISH OIL) 300 MG CAPS Take 2 g by mouth daily       No current facility-administered medications for this visit        Current Outpatient Medications on File Prior to Visit   Medication Sig    carbidopa-levodopa (SINEMET)  mg per tablet Take 1 5 tablets by mouth Three times a day    ibuprofen (MOTRIN) 600 mg tablet Take 1 tablet (600 mg total) by mouth every 6 (six) hours as needed for mild pain or moderate pain    [DISCONTINUED] carbidopa-levodopa (SINEMET)  mg per tablet Take 1 tablet by mouth    acetaminophen (TYLENOL) 325 mg tablet Take 650 mg by mouth every 6 (six) hours as needed    Omega-3 Fatty Acids (OMEGA-3 FISH OIL) 300 MG CAPS Take 2 g by mouth daily    [DISCONTINUED] fluticasone (FLONASE) 50 mcg/act nasal spray 1 spray into each nostril 2 (two) times a day for 181 days (Patient not taking: Reported on 6/11/2019)    [DISCONTINUED] ibuprofen (MOTRIN) 600 mg tablet Take 1 tablet (600 mg total) by mouth every 6 (six) hours as needed for mild pain or moderate pain    [DISCONTINUED] lidocaine (LIDODERM) 5 % Apply 1 patch topically daily Remove & Discard patch within 12 hours or as directed by MD (Patient not taking: Reported on 8/30/2019)    [DISCONTINUED] TURMERIC CURCUMIN PO Take by mouth daily     No current facility-administered medications on file prior to visit  He is allergic to pollen extract       Review of Systems   Constitutional: Negative  HENT: Negative  Eyes: Negative  Respiratory: Negative  Cardiovascular: Negative  Gastrointestinal: Negative  Endocrine: Negative  Genitourinary: Negative  Musculoskeletal: Negative  Leg pain   Skin: Negative  Allergic/Immunologic: Negative  Neurological: Positive for numbness  Negative for tingling  Hematological: Negative  Psychiatric/Behavioral: Negative  Objective:      /84   Pulse 88   Temp (!) 96 °F (35 6 °C) (Skin)   Resp 20   Ht 5' 6" (1 676 m)   Wt 73 5 kg (162 lb)   SpO2 98%   BMI 26 15 kg/m²          Physical Exam   Constitutional: He is oriented to person, place, and time  He appears well-developed and well-nourished  No distress  Appear Stiff   HENT:   Head: Normocephalic and atraumatic  Nose: Nose normal    Mouth/Throat: Oropharynx is clear and moist  No oropharyngeal exudate  Eyes: Pupils are equal, round, and reactive to light  Conjunctivae are normal  Right eye exhibits no discharge  Left eye exhibits no discharge  No scleral icterus  Neck: Neck supple  No thyromegaly present  Cardiovascular: Normal rate, regular rhythm, normal heart sounds and intact distal pulses  No murmur heard  Pulmonary/Chest: Effort normal and breath sounds normal  He has no wheezes  Abdominal: Soft  Bowel sounds are normal  He exhibits no distension  There is no tenderness  There is no guarding  Musculoskeletal:        Right hip: He exhibits tenderness, bony tenderness (lateral  upper thigh over the area of the greater trochanter  ) and crepitus  He exhibits normal range of motion, normal strength, no swelling, no deformity and no laceration  Right knee: He exhibits normal range of motion  No snap appreciated with hip flexion and extension  Straight leg raise test negative for buttock pain, but positive for hamstring tightness  Compression test at the foot positive for reproducing thing and groin pain  Lymphadenopathy:     He has no cervical adenopathy  Neurological: He is alert and oriented to person, place, and time  He displays tremor (resting tremor, present even with intention  )  Skin: He is not diaphoretic  Vitals reviewed

## 2019-08-30 NOTE — ASSESSMENT & PLAN NOTE
Unclear etiology  Working differential include right trochanteric bursitis, gluteal entrapment syndrome, piriformis syndrome, vs muscle strain and stiffness from parkinson's disease  Patient currently on Sinemet for his Parkinson's and follows closely with [facility] neurology  At this time, although patient requests it, there is no indication for a DEXA scan  Will obtain Calcium, Vitamin D and x-ray of right hip  Referral to PT sent  May take Ibuprofen 600 mg q 8 hours scheduled for the next five days  Advised to take ibuprofen with food  F/u in 6 weeks to re-evaluate

## 2019-09-03 ENCOUNTER — TELEPHONE (OUTPATIENT)
Dept: FAMILY MEDICINE CLINIC | Facility: CLINIC | Age: 29
End: 2019-09-03

## 2019-09-04 NOTE — TELEPHONE ENCOUNTER
Please reply as follows,   Blood work is normal with the exception of vitamin D which is pending  X-ray of the hip was normal, no fractures mild changes that occur with aging

## 2019-09-05 ENCOUNTER — TELEPHONE (OUTPATIENT)
Dept: FAMILY MEDICINE CLINIC | Facility: CLINIC | Age: 29
End: 2019-09-05

## 2019-09-05 DIAGNOSIS — G20 PARKINSON'S SYNDROME (HCC): Primary | ICD-10-CM

## 2019-09-05 NOTE — TELEPHONE ENCOUNTER
Patient is requesting lab work to check complete vitamin levels  Specific level he is concern with B6 B12 B1 do to being informed this levels can effect his parkinson disease

## 2019-09-05 NOTE — TELEPHONE ENCOUNTER
B1, B6 and B12 level ordered  Please let patient know to get these labs drawn before his next appointment  Thank you

## 2019-09-06 ENCOUNTER — APPOINTMENT (OUTPATIENT)
Dept: LAB | Facility: HOSPITAL | Age: 29
End: 2019-09-06
Payer: COMMERCIAL

## 2019-09-06 ENCOUNTER — TRANSCRIBE ORDERS (OUTPATIENT)
Dept: LAB | Facility: HOSPITAL | Age: 29
End: 2019-09-06

## 2019-09-06 DIAGNOSIS — G20 PARKINSON'S SYNDROME (HCC): Primary | ICD-10-CM

## 2019-09-06 DIAGNOSIS — G20 PARKINSON'S SYNDROME (HCC): ICD-10-CM

## 2019-09-06 LAB — VIT B12 SERPL-MCNC: 534 PG/ML (ref 100–900)

## 2019-09-06 PROCEDURE — 84207 ASSAY OF VITAMIN B-6: CPT | Performed by: FAMILY MEDICINE

## 2019-09-06 PROCEDURE — 84425 ASSAY OF VITAMIN B-1: CPT

## 2019-09-06 PROCEDURE — 82607 VITAMIN B-12: CPT

## 2019-09-06 PROCEDURE — 36415 COLL VENOUS BLD VENIPUNCTURE: CPT

## 2019-09-07 LAB
25(OH)D2 SERPL-MCNC: <1 NG/ML
25(OH)D3 SERPL-MCNC: 27 NG/ML
25(OH)D3+25(OH)D2 SERPL-MCNC: 28 NG/ML

## 2019-09-09 LAB — VIT B6 SERPL-MCNC: 35.9 UG/L (ref 5.3–46.7)

## 2019-09-10 ENCOUNTER — TELEPHONE (OUTPATIENT)
Dept: FAMILY MEDICINE CLINIC | Facility: CLINIC | Age: 29
End: 2019-09-10

## 2019-09-10 LAB — VIT B1 BLD-SCNC: 174 NMOL/L (ref 66.5–200)

## 2019-10-14 ENCOUNTER — TELEPHONE (OUTPATIENT)
Dept: FAMILY MEDICINE CLINIC | Facility: CLINIC | Age: 29
End: 2019-10-14

## 2019-10-14 DIAGNOSIS — G20 PARKINSON DISEASE (HCC): Primary | ICD-10-CM

## 2019-10-14 NOTE — TELEPHONE ENCOUNTER
Please place an order for ambulatory referral to neurology  For Parkinson's syndrome using the following codes [address], [id number] Patient has an appt on 10/21/2019

## 2019-10-15 ENCOUNTER — TELEPHONE (OUTPATIENT)
Dept: FAMILY MEDICINE CLINIC | Facility: CLINIC | Age: 29
End: 2019-10-15

## 2019-10-15 NOTE — TELEPHONE ENCOUNTER
Patient came in today asking if Dr Kim Gonzalez can fax over a script for ENT, fax number is [phone]    Attn: Kamini Ibanez

## 2019-10-17 DIAGNOSIS — H92.09 OTALGIA, UNSPECIFIED LATERALITY: Primary | ICD-10-CM

## 2019-11-11 ENCOUNTER — OFFICE VISIT (OUTPATIENT)
Dept: FAMILY MEDICINE CLINIC | Facility: CLINIC | Age: 29
End: 2019-11-11

## 2019-11-11 VITALS
OXYGEN SATURATION: 97 % | TEMPERATURE: 97.6 F | HEART RATE: 83 BPM | DIASTOLIC BLOOD PRESSURE: 94 MMHG | WEIGHT: 164 LBS | SYSTOLIC BLOOD PRESSURE: 142 MMHG | BODY MASS INDEX: 26.47 KG/M2 | RESPIRATION RATE: 15 BRPM

## 2019-11-11 DIAGNOSIS — G20 PARKINSON DISEASE (HCC): ICD-10-CM

## 2019-11-11 DIAGNOSIS — I10 ESSENTIAL HYPERTENSION: ICD-10-CM

## 2019-11-11 DIAGNOSIS — G89.29 CHRONIC RIGHT HIP PAIN: Primary | ICD-10-CM

## 2019-11-11 DIAGNOSIS — M25.551 CHRONIC RIGHT HIP PAIN: Primary | ICD-10-CM

## 2019-11-11 PROCEDURE — 1036F TOBACCO NON-USER: CPT | Performed by: FAMILY MEDICINE

## 2019-11-11 PROCEDURE — 99213 OFFICE O/P EST LOW 20 MIN: CPT | Performed by: FAMILY MEDICINE

## 2019-11-11 RX ORDER — MEDICAL SUPPLY, MISCELLANEOUS
EACH MISCELLANEOUS DAILY
Qty: 1 EACH | Refills: 0 | Status: SHIPPED | OUTPATIENT
Start: 2019-11-11 | End: 2022-01-10

## 2019-11-11 RX ORDER — CYCLOBENZAPRINE HCL 10 MG
10 TABLET ORAL
Qty: 30 TABLET | Refills: 0 | Status: SHIPPED | OUTPATIENT
Start: 2019-11-11 | End: 2019-12-30 | Stop reason: SINTOL

## 2019-11-11 NOTE — ASSESSMENT & PLAN NOTE
BP Readings from Last 1 Encounters:   11/11/19 142/94     Slightly elevated in office, however patient is in pain  Will send BP cuff for home monitoring  If BP is elevated at next visit will resume Lisinopril  Reassess at next visit

## 2019-11-11 NOTE — PATIENT INSTRUCTIONS
Hip Bursitis   WHAT YOU NEED TO KNOW:   Hip bursitis is inflammation of the bursa in your hip  The bursa is a fluid-filled sac that acts as a cushion between a bone and a tendon  A tendon is a cord of strong tissue that connects muscles to bones  DISCHARGE INSTRUCTIONS:   Medicines:   · NSAIDs:  These medicines decrease swelling, pain, and fever  NSAIDs are available without a doctor's order  Ask your healthcare provider which medicine is right for you  Ask how much to take and when to take it  Take as directed  NSAIDs can cause stomach bleeding and kidney problems if not taken correctly  · Antibiotics: These help fight an infection caused by bacteria  You may need antibiotics if your bursitis is caused by infection  · Take your medicine as directed  Contact your healthcare provider if you think your medicine is not helping or if you have side effects  Tell him of her if you are allergic to any medicine  Keep a list of the medicines, vitamins, and herbs you take  Include the amounts, and when and why you take them  Bring the list or the pill bottles to follow-up visits  Carry your medicine list with you in case of an emergency  Manage your symptoms:   · Rest:  Rest your hip as much as possible to decrease pain and swelling  Slowly start to do more each day  Return to your daily activities as directed  · Ice:  Ice helps decrease swelling and pain  Ice may also help prevent tissue damage  Use an ice pack, or put crushed ice in a plastic bag  Cover it with a towel and place it on your hip for 15 to 20 minutes, 3 to 4 times each day, as directed  · Sleep position:  Do not lie on your injured hip  You may be more comfortable if you sleep on your stomach or back  · Physical therapy:  A physical therapist teaches you exercises to help improve movement and strength, and to decrease pain    Prevent another hip injury:   · Stretch, warm up, and cool down:  Always stretch and do warmup and cool-down exercises before and after you exercise  This will help loosen your muscles and decrease stress on your hip  Rest between workouts  · Wear proper shoes:  Wear shoes that fit properly and support your feet  You may need to wear shoe inserts called orthotics  Orthotics help position your foot correctly as you walk or exercise  · Maintain a healthy weight:  Ask your healthcare provider how much you should weigh  Ask him to help you create a weight loss plan if you are overweight  · Keep pressure off your hips:  Do not stand or sit for long periods of time  Sit on padded surfaces, such as a cushion or pad, whenever possible  Bend your knees when you  objects from the ground  Follow up with your healthcare provider as directed:  Write down your questions so you remember to ask them during your visits  Contact your healthcare provider if:   · Your pain and swelling increase  · Your symptoms do not improve with treatment  · You have a fever  · You have questions or concerns about your condition or care  © 2017 [facility] Information is for End User's use only and may not be sold, redistributed or otherwise used for commercial purposes  All illustrations and images included in CareNotes® are the copyrighted property of [employer] , [employer]  or Andrew Reyes  The above information is an  only  It is not intended as medical advice for individual conditions or treatments  Talk to your doctor, nurse or pharmacist before following any medical regimen to see if it is safe and effective for you

## 2019-11-11 NOTE — PROGRESS NOTES
Assessment/Plan:    No problem-specific Assessment & Plan notes found for this encounter  Diagnoses and all orders for this visit:    Chronic right hip pain  -     cyclobenzaprine (FLEXERIL) 10 mg tablet; Take 1 tablet (10 mg total) by mouth daily at bedtime  -     ibuprofen (MOTRIN) 100 mg/5 mL suspension; Take 40 mL (800 mg total) by mouth every 8 (eight) hours as needed for mild pain    Essential hypertension  -     Blood Pressure Monitoring (B-D ASSURE BPM/DELUXE ARM CUFF) MISC; by Does not apply route daily          Subjective:      Patient ID: Siomara Kahn is a 26 y o  male  Hip Pain: Patient complains of right hip pain  Onset of the symptoms was 2 months ago  Inciting event: none  Current symptoms include is worse after period of inactivity  Associated symptoms: none  Aggravating symptoms: walking  Patient's overall course: symptoms have progressed to a point and plateaued  Patient has had no prior hip problems  The following portions of the patient's history were reviewed and updated as appropriate: allergies, current medications, past family history, past medical history, past social history, past surgical history and problem list     Review of Systems   Constitutional: Negative for chills and fever  HENT: Negative for ear pain and sore throat  Eyes: Negative for pain and redness  Respiratory: Negative for cough and shortness of breath  Cardiovascular: Negative for chest pain, palpitations and leg swelling  Gastrointestinal: Negative for abdominal pain, constipation, diarrhea, nausea and vomiting  Genitourinary: Negative for dysuria, frequency and hematuria  Musculoskeletal: Positive for arthralgias  Negative for myalgias  Neurological: Negative for dizziness and headaches  Psychiatric/Behavioral: Negative for dysphoric mood  The patient is not nervous/anxious            Objective:      /94   Pulse 83   Temp 97 6 °F (36 4 °C) (Temporal)   Resp 15   Wt 74 4 kg (164 lb)   SpO2 97%   BMI 26 47 kg/m²          Physical Exam   Constitutional: He is oriented to person, place, and time  He appears well-developed and well-nourished  HENT:   Head: Normocephalic and atraumatic  Right Ear: External ear normal    Left Ear: External ear normal    Nose: Nose normal    Mouth/Throat: Oropharynx is clear and moist    Eyes: Pupils are equal, round, and reactive to light  Conjunctivae and EOM are normal    Neck: Normal range of motion  Neck supple  Cardiovascular: Normal rate, regular rhythm, normal heart sounds and intact distal pulses  Pulmonary/Chest: Effort normal and breath sounds normal    Abdominal: Soft  Bowel sounds are normal  There is no tenderness  Musculoskeletal: Normal range of motion  He exhibits no edema  Right hip: He exhibits tenderness  He exhibits normal range of motion  Lumbar back: He exhibits tenderness  He exhibits normal range of motion and no bony tenderness  Lymphadenopathy:     He has no cervical adenopathy  Neurological: He is alert and oriented to person, place, and time  Skin: Skin is warm and dry  Psychiatric: He has a normal mood and affect   His behavior is normal

## 2019-11-11 NOTE — ASSESSMENT & PLAN NOTE
R sided low back pain, likely musculoskeletal as well as greater trochanteric bursitis  Patient to start PT tomorrow  Will send Flexiril 10 mg qhs for 2 weeks  Ibuprofen 800 mg and ice after PT  Of note patient has difficulty swallowing pills, will send liquid ibuprofen  RTC in 4 weeks for reevaluation

## 2019-11-25 ENCOUNTER — TELEPHONE (OUTPATIENT)
Dept: FAMILY MEDICINE CLINIC | Facility: CLINIC | Age: 29
End: 2019-11-25

## 2019-11-25 ENCOUNTER — PATIENT OUTREACH (OUTPATIENT)
Dept: FAMILY MEDICINE CLINIC | Facility: CLINIC | Age: 29
End: 2019-11-25

## 2019-11-25 DIAGNOSIS — G20 PARKINSON DISEASE (HCC): Primary | ICD-10-CM

## 2019-11-25 NOTE — TELEPHONE ENCOUNTER
Vane Storm from home health services called and stated they are unable to accept your referral due to their volume  She said you would have to refer pt somewhere else

## 2019-12-04 ENCOUNTER — PATIENT OUTREACH (OUTPATIENT)
Dept: FAMILY MEDICINE CLINIC | Facility: CLINIC | Age: 29
End: 2019-12-04

## 2019-12-04 NOTE — PROGRESS NOTES
FLAKITA Anna received referral from Pt provider as referral was placed for VNA services by Neurology  Pt provider recent VNA request however it was denied by in network VNA due to their volume  FLAKITA ANNA reached out to Pt by phone to inquire further  Pt responded stating that he suffers from Parkinsons and has difficulty walking and getting dressed in the morning  Pt reports that [facility] at [facility] and attends therapy with OT, PT, and ST  FLAKITA CASTILLO asked Pt if he has any skilled nursing needs in the home and Pt denied at this time  Pt expressed his main need at home is to assist with IADLs including getting dressed, making meals, and getting out of bed  FLAKITA CASTILLO asked Pt who assists him with these tasks now and Pt expressed his girlfriend assists with all of these tasks  FLAKITA ANNA asked Pt if he has ever applied for the waiver program and Pt stated yes  Pt expressed that his girlfriend has already applied him for the waiver program and has begun the process to become a paid caregiver  Pt stated that all documentation has been submitted and they are waiting to hear back for further review  FLAKITA CASTILLO expressed understanding  ISABELLE asked Pt if he has any further needs at this time or any unresolved needs and Pt declined  Pt had no other questions or concerns at this time and expressed that other than waiting for waiver approval, Pt is stable with all needs at this time  FLAKITA CASTILLO encouraged Pt to reach out to FLAKITA CASTILLO if he finds any barriers to the waiver program or needs assistance with appeal if denied and Pt expressed understanding  FLAKITA CASTILLO will remain available for any further assistance as needed

## 2019-12-10 ENCOUNTER — TELEPHONE (OUTPATIENT)
Dept: FAMILY MEDICINE CLINIC | Facility: CLINIC | Age: 29
End: 2019-12-10

## 2019-12-10 NOTE — TELEPHONE ENCOUNTER
Patient is looking for referral to physical therapy  Looking for specific orders so correct therapy is done

## 2019-12-10 NOTE — TELEPHONE ENCOUNTER
I called the pt to see what type of order he needed for physical therapy he stated it was for his leg pain  I looked back in the orders and informed the pt there was an order placed in august for R hip pain he stated that was the order  He wants it faxed to the good [facility]  Order faxed!

## 2019-12-12 ENCOUNTER — TELEPHONE (OUTPATIENT)
Dept: FAMILY MEDICINE CLINIC | Facility: CLINIC | Age: 29
End: 2019-12-12

## 2019-12-12 PROBLEM — R26.2 AMBULATORY DYSFUNCTION: Status: ACTIVE | Noted: 2019-12-12

## 2019-12-12 NOTE — TELEPHONE ENCOUNTER
SIGNATURES NEEDED FOR independent enrollment   FORM RECEIVED VIA FAX  WILL BE PLACED IN YOUR BIN AT ASSIGNED DELIVERY TIMES

## 2019-12-22 ENCOUNTER — HOSPITAL ENCOUNTER (EMERGENCY)
Facility: HOSPITAL | Age: 29
Discharge: HOME | End: 2019-12-22
Attending: EMERGENCY MEDICINE | Admitting: EMERGENCY MEDICINE
Payer: COMMERCIAL

## 2019-12-22 VITALS
BODY MASS INDEX: 26.94 KG/M2 | RESPIRATION RATE: 16 BRPM | DIASTOLIC BLOOD PRESSURE: 87 MMHG | TEMPERATURE: 98.2 F | WEIGHT: 166.89 LBS | OXYGEN SATURATION: 97 % | HEART RATE: 86 BPM | SYSTOLIC BLOOD PRESSURE: 133 MMHG

## 2019-12-22 DIAGNOSIS — M25.551 CHRONIC RIGHT HIP PAIN: Primary | ICD-10-CM

## 2019-12-22 DIAGNOSIS — G89.29 CHRONIC RIGHT HIP PAIN: Primary | ICD-10-CM

## 2019-12-22 PROCEDURE — 96372 THER/PROPH/DIAG INJ SC/IM: CPT

## 2019-12-22 PROCEDURE — 99284 EMERGENCY DEPT VISIT MOD MDM: CPT | Performed by: PHYSICIAN ASSISTANT

## 2019-12-22 PROCEDURE — 99283 EMERGENCY DEPT VISIT LOW MDM: CPT

## 2019-12-22 RX ORDER — LIDOCAINE 50 MG/G
1 PATCH TOPICAL ONCE
Status: DISCONTINUED | OUTPATIENT
Start: 2019-12-22 | End: 2019-12-22 | Stop reason: HOSPADM

## 2019-12-22 RX ORDER — KETOROLAC TROMETHAMINE 30 MG/ML
15 INJECTION, SOLUTION INTRAMUSCULAR; INTRAVENOUS ONCE
Status: COMPLETED | OUTPATIENT
Start: 2019-12-22 | End: 2019-12-22

## 2019-12-22 RX ADMIN — KETOROLAC TROMETHAMINE 15 MG: 30 INJECTION, SOLUTION INTRAMUSCULAR; INTRAVENOUS at 12:45

## 2019-12-22 RX ADMIN — LIDOCAINE 1 PATCH: 50 PATCH TOPICAL at 12:45

## 2019-12-22 NOTE — DISCHARGE INSTRUCTIONS
Continue pain management at home as previously prescribed  Continue physical therapy as previously instructed  Follow-up with PCP and Ortho as previously scheduled for monitoring and further evaluation of symptoms    Return to ED if symptoms worsen
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
## 2019-12-30 ENCOUNTER — OFFICE VISIT (OUTPATIENT)
Dept: FAMILY MEDICINE CLINIC | Facility: CLINIC | Age: 29
End: 2019-12-30

## 2019-12-30 VITALS
HEART RATE: 72 BPM | RESPIRATION RATE: 18 BRPM | WEIGHT: 167 LBS | HEIGHT: 66 IN | BODY MASS INDEX: 26.84 KG/M2 | DIASTOLIC BLOOD PRESSURE: 88 MMHG | SYSTOLIC BLOOD PRESSURE: 136 MMHG | TEMPERATURE: 97.5 F | OXYGEN SATURATION: 99 %

## 2019-12-30 DIAGNOSIS — M79.604 RIGHT LEG PAIN: ICD-10-CM

## 2019-12-30 DIAGNOSIS — I10 ESSENTIAL HYPERTENSION: ICD-10-CM

## 2019-12-30 DIAGNOSIS — R26.2 AMBULATORY DYSFUNCTION: ICD-10-CM

## 2019-12-30 DIAGNOSIS — G20 PARKINSON'S SYNDROME (HCC): Primary | ICD-10-CM

## 2019-12-30 PROBLEM — G56.00 CARPAL TUNNEL SYNDROME: Status: ACTIVE | Noted: 2019-09-10

## 2019-12-30 PROBLEM — Z87.09 HISTORY OF DEVIATED NASAL SEPTUM: Status: ACTIVE | Noted: 2019-12-30

## 2019-12-30 PROCEDURE — 99213 OFFICE O/P EST LOW 20 MIN: CPT | Performed by: FAMILY MEDICINE

## 2019-12-30 RX ORDER — METHOCARBAMOL 500 MG/1
500 TABLET, FILM COATED ORAL
Qty: 30 TABLET | Refills: 3 | Status: SHIPPED | OUTPATIENT
Start: 2019-12-30 | End: 2022-01-10

## 2019-12-30 NOTE — ASSESSMENT & PLAN NOTE
BP Readings from Last 1 Encounters:   12/30/19 136/88   Currently controlled  Continue current management  Reassess at next visit

## 2019-12-30 NOTE — PROGRESS NOTES
Assessment/Plan:    Right leg pain  Doing well with PT  States Flexeril gave chest tightness and congestion  Will switch to Robaxin 500 mg qhs   RTC in 2-3 months  History of deviated nasal septum  Has ENT appointment 1/23 at 16:00    Essential hypertension  BP Readings from Last 1 Encounters:   12/30/19 136/88   Currently controlled  Continue current management  Reassess at next visit  Diagnoses and all orders for this visit:    Parkinson's syndrome [facility])    Ambulatory dysfunction    Right leg pain  -     methocarbamol (ROBAXIN) 500 mg tablet; Take 1 tablet (500 mg total) by mouth daily at bedtime as needed for muscle spasms    Essential hypertension          Subjective:      Patient ID: Royal Arriaga is a 26 y o  male  Who presents for follow up on their chronic conditions  Complains of leg pain  Patient Active Problem List:     Essential hypertension, well controlled  Right leg pain, improved with PT, Flexeril caused chest tightness, asked to change to different medication  Ambulatory dysfunction, will bring paperwork to be signed  History of deviated nasal septum, c/o chronic congestion, has appt 1/23 with ENT  The following portions of the patient's history were reviewed and updated as appropriate: allergies, current medications, past family history, past medical history, past social history, past surgical history and problem list     Review of Systems   Constitutional: Negative for chills and fever  HENT: Positive for congestion  Negative for ear pain  Eyes: Negative for pain  Respiratory: Negative for cough, shortness of breath and wheezing  Cardiovascular: Negative for chest pain, palpitations and leg swelling  Gastrointestinal: Negative for abdominal pain, constipation, diarrhea, nausea and vomiting  Genitourinary: Negative for dysuria and hematuria  Musculoskeletal: Positive for arthralgias  Skin: Negative for rash     Neurological: Negative for dizziness and headaches  Psychiatric/Behavioral: Negative for dysphoric mood  Objective:      /88 (BP Location: Right arm, Patient Position: Sitting, Cuff Size: Standard)   Pulse 72   Temp 97 5 °F (36 4 °C) (Temporal)   Resp 18   Ht 5' 6" (1 676 m)   Wt 75 8 kg (167 lb)   SpO2 99%   BMI 26 95 kg/m²          Physical Exam   Constitutional: He is oriented to person, place, and time  He appears well-developed and well-nourished  HENT:   Head: Normocephalic and atraumatic  Right Ear: External ear normal    Left Ear: External ear normal    Nose: Nose normal    Mouth/Throat: Oropharynx is clear and moist    Eyes: Pupils are equal, round, and reactive to light  Conjunctivae and EOM are normal    Neck: Normal range of motion  Neck supple  Cardiovascular: Normal rate, regular rhythm, normal heart sounds and intact distal pulses  Pulmonary/Chest: Effort normal and breath sounds normal    Abdominal: Soft  Bowel sounds are normal  There is no tenderness  Musculoskeletal: Normal range of motion  He exhibits tenderness  He exhibits no edema  Lymphadenopathy:     He has no cervical adenopathy  Neurological: He is alert and oriented to person, place, and time  Gait abnormal    Skin: Skin is warm and dry  Psychiatric: He has a normal mood and affect   His behavior is normal

## 2019-12-30 NOTE — PATIENT INSTRUCTIONS
Lifestyle Medicine Tip Sheet   1  Eat predominantly less processed foods such as fast food, T V  dinners, and gutiérrez  2  Eat Close to [employer], [employer] or [employer]     3  Eat a predominantly plant based diet   a  Dark Leafy Greens   b  Fruits/Vegetables   c  Whole Grains: Whole wheat, barely, wheat berries, quinoa, steel cut oats, brown rice, whole wheat pasta  d  Legumes: kidney beans, mora beans, white beans, black beans, garbanzo beans (chickpeas), lima beans (mature, dried), split peas, lentils, and edamame (green soybeans)       4  At least half of the plate should contain fruits or vegetables     5  Liquid should be predominantly water (limit soda and juice)     6  Watch portion size  7  Foods you should avoid or limit?   - Fats - Specifically saturated and trans-fats  They are found in margarines, many fast foods, and some store-bought baked goods  Saturated and Trans-fats can raise your cholesterol level and your chance of getting heart disease        - When you cook, it's best to use no oils but if needed try to limit the amount of oil used as oil contains many calories per volume and is very unhealthy when heated during cooking    - Sugar -Limit or avoid sugar, sweets, and refined grains  Refined grains are found in white bread, white rice, most forms of pasta, and most packaged "snack" foods    - Try not to cook with salt and avoid adding extra salt to your meals   - Meat - Studies have shown that eating a lot of red meat and poultry can increase your risk of certain health problems, including heart disease, diabetes, obesity and cancer  So try to limit the intake of it  8  Practice good sleep hygiene by getting 7-9 hours of sleep a night     9  Daily exercise minimum of 30 minutes (walking around the block)     10  Socialization (friends and family)   - Explore your neighborhood   Go to the park, spend time at [employer]   - Consider taking a class or volunteering to connect with new people     If you are interested you can read more about healthy food choices at the following websites:   a  NutritionOtelic  org   b  Home cooking recipes: https://www [employer]/   c  http://ree info/   d  Familydoctor  org

## 2019-12-30 NOTE — ASSESSMENT & PLAN NOTE
Doing well with PT  States Flexeril gave chest tightness and congestion  Will switch to Robaxin 500 mg qhs   RTC in 2-3 months

## 2020-01-13 ENCOUNTER — OFFICE VISIT (OUTPATIENT)
Dept: FAMILY MEDICINE CLINIC | Facility: CLINIC | Age: 30
End: 2020-01-13

## 2020-01-13 VITALS
DIASTOLIC BLOOD PRESSURE: 100 MMHG | HEART RATE: 87 BPM | BODY MASS INDEX: 27 KG/M2 | HEIGHT: 66 IN | RESPIRATION RATE: 18 BRPM | WEIGHT: 168 LBS | TEMPERATURE: 97 F | OXYGEN SATURATION: 96 % | SYSTOLIC BLOOD PRESSURE: 164 MMHG

## 2020-01-13 DIAGNOSIS — G89.29 CHRONIC RIGHT HIP PAIN: Primary | ICD-10-CM

## 2020-01-13 DIAGNOSIS — M25.551 CHRONIC RIGHT HIP PAIN: Primary | ICD-10-CM

## 2020-01-13 DIAGNOSIS — G89.29 CHRONIC RIGHT SHOULDER PAIN: ICD-10-CM

## 2020-01-13 DIAGNOSIS — M25.511 CHRONIC RIGHT SHOULDER PAIN: ICD-10-CM

## 2020-01-13 PROBLEM — M67.921 TENDINOPATHY OF RIGHT BICEPS TENDON: Status: ACTIVE | Noted: 2020-01-13

## 2020-01-13 PROCEDURE — 3008F BODY MASS INDEX DOCD: CPT | Performed by: FAMILY MEDICINE

## 2020-01-13 PROCEDURE — 1036F TOBACCO NON-USER: CPT | Performed by: FAMILY MEDICINE

## 2020-01-13 PROCEDURE — 99213 OFFICE O/P EST LOW 20 MIN: CPT | Performed by: FAMILY MEDICINE

## 2020-01-13 NOTE — PROGRESS NOTES
Assessment/Plan:    Tendinopathy of right biceps tendon  Recommended exercise, ice and ibuprofen  Patient requested xray, will get xray of shoulder  Diagnoses and all orders for this visit:    Chronic right hip pain  -     XR shoulder 2+ vw right; Future  -     ibuprofen (MOTRIN) 100 mg/5 mL suspension; Take 40 mL (800 mg total) by mouth every 8 (eight) hours as needed for mild pain    Chronic right shoulder pain          Subjective:      Patient ID: Nohemy Alberts is a 26 y o  male  Shoulder Pain: Patient complaints of right shoulder pain  This is evaluated as a personal injury  The pain is described as shooting and throbbing  The onset of the pain was gradual, starting about 2 months ago  The pain occurs continuously and lasts 1 hour  Location is biceps tendon  One episode of dislocation  Symptoms are aggravated by all activities  Symptoms are diminished by rest, medication: Tylenol used and beneficial  Limited activities include: reaching, lifting, pulling  The following symptoms are reported: No stiffness, no weakness, no swelling  Mild TTP of the supraspinatus and subscapularis insertion, mild pain with belly push off and lag test           The following portions of the patient's history were reviewed and updated as appropriate: allergies, current medications, past family history, past medical history, past social history, past surgical history and problem list     Review of Systems   Constitutional: Negative for chills and fever  Respiratory: Negative for shortness of breath  Cardiovascular: Negative for chest pain  Gastrointestinal: Negative for abdominal pain  Musculoskeletal: Positive for arthralgias  Negative for joint swelling and myalgias  Skin: Negative for rash  Neurological: Negative for dizziness and headaches           Objective:      /100 (BP Location: Right arm, Patient Position: Sitting, Cuff Size: Standard)   Pulse 87   Temp (!) 97 °F (36 1 °C) (Temporal)   Resp 18 Ht 5' 6" (1 676 m)   Wt 76 2 kg (168 lb)   SpO2 96%   BMI 27 12 kg/m²          Physical Exam   Constitutional: He is oriented to person, place, and time  He appears well-developed and well-nourished  HENT:   Head: Normocephalic and atraumatic  Right Ear: External ear normal    Left Ear: External ear normal    Nose: Nose normal    Mouth/Throat: Oropharynx is clear and moist    Eyes: Pupils are equal, round, and reactive to light  Conjunctivae and EOM are normal    Neck: Normal range of motion  Neck supple  Cardiovascular: Normal rate, regular rhythm, normal heart sounds and intact distal pulses  Pulmonary/Chest: Effort normal and breath sounds normal    Abdominal: Soft  Bowel sounds are normal  There is no tenderness  Musculoskeletal: Normal range of motion  He exhibits tenderness (TTP of insertion of subscapularis and suprapinatus, mild pain with belly push off and lag test  Full ROM, no weakness  Moderate TTP of bicep head  )  He exhibits no edema  Lymphadenopathy:     He has no cervical adenopathy  Neurological: He is alert and oriented to person, place, and time  Skin: Skin is warm and dry  Psychiatric: He has a normal mood and affect   His behavior is normal

## 2020-01-13 NOTE — PATIENT INSTRUCTIONS
Lifestyle Medicine Tip Sheet   1  Eat predominantly less processed foods such as fast food, T V  dinners, and gutiérrez  2  Eat Close to [facility], [employer] or [employer])     3  Eat a predominantly plant based diet   a  Dark Leafy Greens   b  Fruits/Vegetables   c  Whole Grains: Whole wheat, barely, wheat berries, quinoa, steel cut oats, brown rice, whole wheat pasta  d  Legumes: kidney beans, mora beans, white beans, black beans, garbanzo beans (chickpeas), lima beans (mature, dried), split peas, lentils, and edamame (green soybeans)       4  At least half of the plate should contain fruits or vegetables     5  Liquid should be predominantly water (limit soda and juice)     6  Watch portion size  7  Foods you should avoid or limit?   - Fats - Specifically saturated and trans-fats  They are found in margarines, many fast foods, and some store-bought baked goods  Saturated and Trans-fats can raise your cholesterol level and your chance of getting heart disease        - When you cook, it's best to use no oils but if needed try to limit the amount of oil used as oil contains many calories per volume and is very unhealthy when heated during cooking    - Sugar -Limit or avoid sugar, sweets, and refined grains  Refined grains are found in white bread, white rice, most forms of pasta, and most packaged "snack" foods    - Try not to cook with salt and avoid adding extra salt to your meals   - Meat - Studies have shown that eating a lot of red meat and poultry can increase your risk of certain health problems, including heart disease, diabetes, obesity and cancer  So try to limit the intake of it  8  Practice good sleep hygiene by getting 7-9 hours of sleep a night     9  Daily exercise minimum of 30 minutes (walking around the block)     10  Socialization (friends and family)   - Explore your neighborhood   Go to the park, spend time at [employer]   - Consider taking a class or volunteering to connect with new people     If you are interested you can read more about healthy food choices at the following websites:   a  NutritionSnapguide  org   b  Home cooking recipes: https://www [employer].ru/   c  http://ree info/   d  Familydoctor  org

## 2020-02-06 ENCOUNTER — TELEPHONE (OUTPATIENT)
Dept: FAMILY MEDICINE CLINIC | Facility: CLINIC | Age: 30
End: 2020-02-06

## 2020-02-06 NOTE — TELEPHONE ENCOUNTER
form was completed on 12/12/19      Needs md preceptor signature     Placed in your bin     fax  sent to alena

## 2020-02-18 ENCOUNTER — TELEPHONE (OUTPATIENT)
Dept: FAMILY MEDICINE CLINIC | Facility: CLINIC | Age: 30
End: 2020-02-18

## 2020-02-18 DIAGNOSIS — G20 PARKINSON'S SYNDROME (HCC): Primary | ICD-10-CM

## 2020-02-18 NOTE — TELEPHONE ENCOUNTER
Patient came in today asking for a script for [facility]  Please advise  Patient has an appointment scheduled with you this Thursday  Luke Dudley fax number is [phone]

## 2020-02-20 ENCOUNTER — OFFICE VISIT (OUTPATIENT)
Dept: FAMILY MEDICINE CLINIC | Facility: CLINIC | Age: 30
End: 2020-02-20

## 2020-02-20 VITALS
BODY MASS INDEX: 26.52 KG/M2 | HEART RATE: 83 BPM | RESPIRATION RATE: 18 BRPM | SYSTOLIC BLOOD PRESSURE: 160 MMHG | DIASTOLIC BLOOD PRESSURE: 100 MMHG | HEIGHT: 66 IN | OXYGEN SATURATION: 97 % | WEIGHT: 165 LBS | TEMPERATURE: 98 F

## 2020-02-20 DIAGNOSIS — G89.29 CHRONIC RIGHT HIP PAIN: Primary | ICD-10-CM

## 2020-02-20 DIAGNOSIS — M25.551 CHRONIC RIGHT HIP PAIN: Primary | ICD-10-CM

## 2020-02-20 DIAGNOSIS — I10 ESSENTIAL HYPERTENSION: ICD-10-CM

## 2020-02-20 PROCEDURE — 3077F SYST BP >= 140 MM HG: CPT | Performed by: FAMILY MEDICINE

## 2020-02-20 PROCEDURE — T1015 CLINIC SERVICE: HCPCS | Performed by: FAMILY MEDICINE

## 2020-02-20 PROCEDURE — 3008F BODY MASS INDEX DOCD: CPT | Performed by: FAMILY MEDICINE

## 2020-02-20 PROCEDURE — 99213 OFFICE O/P EST LOW 20 MIN: CPT | Performed by: FAMILY MEDICINE

## 2020-02-20 PROCEDURE — 1036F TOBACCO NON-USER: CPT | Performed by: FAMILY MEDICINE

## 2020-02-20 PROCEDURE — 3080F DIAST BP >= 90 MM HG: CPT | Performed by: FAMILY MEDICINE

## 2020-02-20 RX ORDER — AMLODIPINE BESYLATE 5 MG/1
5 TABLET ORAL DAILY
Qty: 30 TABLET | Refills: 5 | Status: SHIPPED | OUTPATIENT
Start: 2020-02-20 | End: 2022-02-11

## 2020-02-20 RX ORDER — LISINOPRIL 5 MG/1
5 TABLET ORAL DAILY
Qty: 30 TABLET | Refills: 2 | Status: SHIPPED | OUTPATIENT
Start: 2020-02-20 | End: 2020-02-20

## 2020-02-20 NOTE — PATIENT INSTRUCTIONS
Hip Pain   WHAT YOU NEED TO KNOW:   Hip pain can be caused by a number of conditions, such as bursitis, arthritis, or muscle or tendon strain  X-rays do not show broken bones  You may have swelling in the fluid-filled sacs that protect your muscles and tendons  Hip pain can also be caused by a lower back problem  Hip pain may be caused by trauma, playing sports, or running  Your pain may start in your hip and go to your thigh, buttock, or groin  DISCHARGE INSTRUCTIONS:   Medicines:   · NSAIDs , such as ibuprofen, help decrease swelling, pain, and fever  This medicine is available with or without a doctor's order  NSAIDs can cause stomach bleeding or kidney problems in certain people  If you take blood thinner medicine, always ask your healthcare provider if NSAIDs are safe for you  Always read the medicine label and follow directions  · Take your medicine as directed  Contact your healthcare provider if you think your medicine is not helping or if you have side effects  Tell him of her if you are allergic to any medicine  Keep a list of the medicines, vitamins, and herbs you take  Include the amounts, and when and why you take them  Bring the list or the pill bottles to follow-up visits  Carry your medicine list with you in case of an emergency  Return to the emergency department if:   · Your pain gets worse  · You have numbness in your leg or toes  · You cannot put any weight on or move your hip  Contact your healthcare provider if:   · You have a fever  · Your pain does not decrease, even after treatment  · You have questions or concerns about your condition or care  Follow up with your healthcare provider as directed: You may need physical therapy, an injection, or more testing  You may need to see an orthopedic specialist  Write down your questions so you remember to ask them during your visits    Manage your hip pain:   · Rest  your injured hip so that it can heal  You may need to avoid putting any weight on your hip for at least 48 hours  Return to normal activities as directed  · Ice  the injury for 20 minutes every 4 hours, or as directed  Use an ice pack, or put crushed ice in a plastic bag  Cover it with a towel to protect your skin  Ice helps prevent tissue damage and decreases swelling and pain  · Elevate  your injured hip above the level of your heart as often as you can  This will help decrease swelling and pain  If possible, prop your hip and leg on pillows or blankets to keep the area elevated comfortably  · Maintain a healthy weight  Extra body weight can cause pressure and pain in your hip, knee, and ankle joints  Ask your healthcare provider how much you should weigh  Ask him to help you create a weight loss plan if you are overweight  · Use assistive devices as directed  You may need to use a cane or crutches  Assistive devices help decrease pain and pressure on your hip when you walk  Ask your healthcare provider for more information about assistive devices and how to use them correctly  © 2017 [facility] Information is for End User's use only and may not be sold, redistributed or otherwise used for commercial purposes  All illustrations and images included in CareNotes® are the copyrighted property of [employer] M [employer]  or [employer]yes  The above information is an  only  It is not intended as medical advice for individual conditions or treatments  Talk to your doctor, nurse or pharmacist before following any medical regimen to see if it is safe and effective for you

## 2020-02-20 NOTE — ASSESSMENT & PLAN NOTE
Improving, nearly resolved  Now with left sided pain, patient still in physical therapy  Recommended continuing physical therapy

## 2020-02-20 NOTE — PROGRESS NOTES
Assessment/Plan:    Chronic right hip pain  Improving, nearly resolved  Now with left sided pain, patient still in physical therapy  Recommended continuing physical therapy  Essential hypertension  BP Readings from Last 1 Encounters:   02/20/20 160/100   BP elevated last 2 visits, previously well controlled  Patient states he does not want to take Lisinopril due to him reading about interactions with Sinemet, will start Amlodipine 5 mg  Reassess at next visit  Diagnoses and all orders for this visit:    Chronic right hip pain    Essential hypertension  -     Discontinue: lisinopril (ZESTRIL) 5 mg tablet; Take 1 tablet (5 mg total) by mouth daily  -     amLODIPine (NORVASC) 5 mg tablet; Take 1 tablet (5 mg total) by mouth daily          Subjective:      Patient ID: Bonnie Pizarro is a 31 y o  male  This is a very pleasant 31 y o  male who presents to the clinic for management of their chronic medical conditions  Patient's medical conditions are stable unless noted otherwise above  Patient has not had any recent hospitalizations, or medical emergencies since last visit  Patient has no further complaints other than what is mentioned in the ROS  The following portions of the patient's history were reviewed and updated as appropriate: allergies, current medications, past family history, past medical history, past social history, past surgical history and problem list     Review of Systems   Constitutional: Negative for chills and fever  HENT: Negative for ear pain  Eyes: Negative for pain  Respiratory: Negative for cough, shortness of breath and wheezing  Cardiovascular: Negative for chest pain, palpitations and leg swelling  Gastrointestinal: Negative for abdominal pain, constipation, diarrhea, nausea and vomiting  Genitourinary: Negative for dysuria and hematuria  Musculoskeletal: Negative for arthralgias  Skin: Negative for rash     Neurological: Negative for dizziness and headaches  Psychiatric/Behavioral: Negative for dysphoric mood  Objective:      /100 (BP Location: Right arm, Patient Position: Sitting, Cuff Size: Standard)   Pulse 83   Temp 98 °F (36 7 °C) (Temporal)   Resp 18   Ht 5' 6" (1 676 m)   Wt 74 8 kg (165 lb)   SpO2 97%   BMI 26 63 kg/m²          Physical Exam   Constitutional: He is oriented to person, place, and time  He appears well-developed and well-nourished  HENT:   Head: Normocephalic and atraumatic  Right Ear: External ear normal    Left Ear: External ear normal    Nose: Nose normal    Mouth/Throat: Oropharynx is clear and moist    Eyes: Pupils are equal, round, and reactive to light  Conjunctivae and EOM are normal    Neck: Normal range of motion  Neck supple  Cardiovascular: Normal rate, regular rhythm, normal heart sounds and intact distal pulses  Pulmonary/Chest: Effort normal and breath sounds normal    Abdominal: Soft  Bowel sounds are normal  There is no tenderness  Musculoskeletal: Normal range of motion  He exhibits tenderness (TTP L buttocks and thigh)  He exhibits no edema  Lymphadenopathy:     He has no cervical adenopathy  Neurological: He is alert and oriented to person, place, and time  bradykinesia   Skin: Skin is warm and dry  Psychiatric: He has a normal mood and affect   His behavior is normal

## 2020-02-20 NOTE — ASSESSMENT & PLAN NOTE
BP Readings from Last 1 Encounters:   02/20/20 160/100   BP elevated last 2 visits, previously well controlled  Patient states he does not want to take Lisinopril due to him reading about interactions with Sinemet, will start Amlodipine 5 mg  Reassess at next visit

## 2020-04-21 ENCOUNTER — TELEPHONE (OUTPATIENT)
Dept: FAMILY MEDICINE CLINIC | Facility: CLINIC | Age: 30
End: 2020-04-21

## 2020-04-29 ENCOUNTER — TELEPHONE (OUTPATIENT)
Dept: FAMILY MEDICINE CLINIC | Facility: CLINIC | Age: 30
End: 2020-04-29

## 2020-05-24 ENCOUNTER — NURSE TRIAGE (OUTPATIENT)
Dept: OTHER | Facility: OTHER | Age: 30
End: 2020-05-24

## 2020-07-14 RX ORDER — MIRTAZAPINE 15 MG/1
15 TABLET, FILM COATED ORAL
COMMUNITY
Start: 2020-06-22 | End: 2021-06-22

## 2020-07-14 RX ORDER — SUMATRIPTAN 25 MG/1
25 TABLET, FILM COATED ORAL DAILY
COMMUNITY

## 2020-07-15 ENCOUNTER — OFFICE VISIT (OUTPATIENT)
Dept: FAMILY MEDICINE CLINIC | Facility: CLINIC | Age: 30
End: 2020-07-15

## 2020-07-15 VITALS
RESPIRATION RATE: 16 BRPM | TEMPERATURE: 97.7 F | DIASTOLIC BLOOD PRESSURE: 100 MMHG | SYSTOLIC BLOOD PRESSURE: 130 MMHG | HEART RATE: 98 BPM | OXYGEN SATURATION: 98 % | BODY MASS INDEX: 25.86 KG/M2 | WEIGHT: 160.2 LBS

## 2020-07-15 DIAGNOSIS — N50.811 TESTICULAR PAIN, RIGHT: Primary | ICD-10-CM

## 2020-07-15 DIAGNOSIS — I10 ESSENTIAL HYPERTENSION: ICD-10-CM

## 2020-07-15 PROCEDURE — 1036F TOBACCO NON-USER: CPT | Performed by: FAMILY MEDICINE

## 2020-07-15 PROCEDURE — 3080F DIAST BP >= 90 MM HG: CPT | Performed by: FAMILY MEDICINE

## 2020-07-15 PROCEDURE — 3075F SYST BP GE 130 - 139MM HG: CPT | Performed by: FAMILY MEDICINE

## 2020-07-15 PROCEDURE — 99214 OFFICE O/P EST MOD 30 MIN: CPT | Performed by: FAMILY MEDICINE
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
## 2020-07-15 NOTE — PATIENT INSTRUCTIONS
Scrotal Pain   WHAT YOU NEED TO KNOW:   What do I need to know about scrotal pain? Scrotal pain can happen at any age  The cause of scrotal pain can range from a minor injury to a serious medical condition  It is very important to seek immediate care if you have scrotal pain  The pain may be a warning sign of a serious condition that will need treatment  Without immediate care, you may be at increased risk for losing a testicle or being sterile (not having children)  What may cause scrotal pain? · Torsion (twisting) of the testicle, cord that carries sperm from the testicle, or tissue attached to the testicle    · An infection of the testicle or other area in the scrotum    · A hydrocele (fluid buildup around the testicle) or varicocele (blood backup in veins in the scrotum)    · An inguinal hernia (tissue pushed out of place in your groin)    · Carl gangrene (tissue death of the area between the scrotum and anus)    · A urinary tract infection or stone that is passing, or an infected appendix    · An injury in your groin or scrotum  What are the warning signs of a serious medical problem? Seek care immediately if you have any of the following:  · Pain that starts suddenly or is severe    · Swelling in your scrotum or groin, especially if you also have severe pain or are vomiting    · Red or black patches of skin on your scrotum or area between your penis and anus    · Blisters anywhere in your groin or scrotum    · A fever  How is the cause of scrotal pain diagnosed? Your healthcare provider will examine you and ask about your pain  Tell the provider when the pain started and how long it lasts  Your provider will ask if pain started in another area and moved to your scrotum  The pain may also have moved from your scrotum to another area  Tell your provider if you have pain during exercise or if you had an injury to your groin   Also tell your provider if you have any problems urinating or if any discharge came out of your penis  Your provider may also ask about your sexual activity  · Blood tests  may be used to check for signs of infection  · Ultrasound pictures  may show a problem with your testicles or tissues in your scrotum  An ultrasound may also show kidney stones or other problems that may be causing your pain  How is scrotal pain treated? Treatment will depend on the cause of your pain:  · Prescription pain medicine  may be given  Ask your healthcare provider how to take this medicine safely  Some prescription pain medicines contain acetaminophen  Do not take other medicines that contain acetaminophen without talking to your healthcare provider  Too much acetaminophen may cause liver damage  Prescription pain medicine may cause constipation  Ask your healthcare provider how to prevent or treat constipation  · NSAIDs , such as ibuprofen, help decrease swelling, pain, and fever  This medicine is available with or without a doctor's order  NSAIDs can cause stomach bleeding or kidney problems in certain people  If you take blood thinner medicine, always ask your healthcare provider if NSAIDs are safe for you  Always read the medicine label and follow directions  · Antibiotics  are used to treat a bacterial infection  · Surgery  may be needed to untwist the testicle, or cord, or to remove dead or infected tissue  What can I do to manage my symptoms? · Wear a support device, if directed  A support device, such as a jock strap, can help keep your scrotum lifted and supported  This can help decrease pain  · Apply ice to your scrotum  Ice helps decrease pain and swelling  Use an ice pack, or put crushed ice in a plastic bag  Cover the pack or bag with a towel before you apply it to your scrotum  Apply ice for 15 to 20 minutes every hour, or as directed  When should I seek immediate care? · You have any warning signs of a serious problem      · You have pain or swelling that starts or gets worse quickly  · You have skin changes in your scrotum, such as a dark patch  · You have a fever  When should I contact my healthcare provider? · Your pain does not get better, even after you take pain medicine  · You have new or worsening pain  · You have questions or concerns about your condition or care  CARE AGREEMENT:   You have the right to help plan your care  Learn about your health condition and how it may be treated  Discuss treatment options with your caregivers to decide what care you want to receive  You always have the right to refuse treatment  The above information is an  only  It is not intended as medical advice for individual conditions or treatments  Talk to your doctor, nurse or pharmacist before following any medical regimen to see if it is safe and effective for you  © 2017 [address] Information is for End User's use only and may not be sold, redistributed or otherwise used for commercial purposes  All illustrations and images included in CareNotes® are the copyrighted property of A D A M [employer] Inc  or Andrew Reyes

## 2020-11-05 ENCOUNTER — TELEPHONE (OUTPATIENT)
Dept: OTHER | Facility: OTHER | Age: 30
End: 2020-11-05

## 2020-11-06 ENCOUNTER — HOSPITAL ENCOUNTER (EMERGENCY)
Facility: HOSPITAL | Age: 30
Discharge: HOME | End: 2020-11-06
Attending: EMERGENCY MEDICINE | Admitting: EMERGENCY MEDICINE
Payer: COMMERCIAL

## 2020-11-06 ENCOUNTER — APPOINTMENT (EMERGENCY)
Dept: CT IMAGING | Facility: HOSPITAL | Age: 30
End: 2020-11-06
Payer: COMMERCIAL

## 2020-11-06 VITALS
WEIGHT: 167.6 LBS | DIASTOLIC BLOOD PRESSURE: 93 MMHG | SYSTOLIC BLOOD PRESSURE: 144 MMHG | OXYGEN SATURATION: 99 % | BODY MASS INDEX: 27.05 KG/M2 | RESPIRATION RATE: 18 BRPM | TEMPERATURE: 96.4 F | HEART RATE: 86 BPM

## 2020-11-06 DIAGNOSIS — R51.9 HEADACHE: ICD-10-CM

## 2020-11-06 DIAGNOSIS — S09.90XA CLOSED HEAD INJURY, INITIAL ENCOUNTER: ICD-10-CM
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
## 2020-11-06 PROCEDURE — 99284 EMERGENCY DEPT VISIT MOD MDM: CPT

## 2020-11-06 PROCEDURE — G1004 CDSM NDSC: HCPCS

## 2020-11-06 PROCEDURE — 99282 EMERGENCY DEPT VISIT SF MDM: CPT | Performed by: EMERGENCY MEDICINE

## 2020-11-06 PROCEDURE — 70450 CT HEAD/BRAIN W/O DYE: CPT

## 2020-12-12 ENCOUNTER — TELEPHONE (OUTPATIENT)
Dept: OTHER | Facility: OTHER | Age: 30
End: 2020-12-12

## 2021-05-16 ENCOUNTER — HOSPITAL ENCOUNTER (EMERGENCY)
Facility: HOSPITAL | Age: 31
Discharge: HOME | End: 2021-05-16
Attending: EMERGENCY MEDICINE | Admitting: EMERGENCY MEDICINE
Payer: COMMERCIAL

## 2021-05-16 ENCOUNTER — APPOINTMENT (EMERGENCY)
Dept: CT IMAGING | Facility: HOSPITAL | Age: 31
End: 2021-05-16
Payer: COMMERCIAL

## 2021-05-16 VITALS
WEIGHT: 157.41 LBS | HEART RATE: 91 BPM | DIASTOLIC BLOOD PRESSURE: 85 MMHG | OXYGEN SATURATION: 96 % | RESPIRATION RATE: 14 BRPM | BODY MASS INDEX: 25.41 KG/M2 | TEMPERATURE: 99.1 F | SYSTOLIC BLOOD PRESSURE: 143 MMHG

## 2021-05-16 DIAGNOSIS — R09.89 GLOBUS SENSATION: Primary | ICD-10-CM

## 2021-05-16 PROCEDURE — 70490 CT SOFT TISSUE NECK W/O DYE: CPT

## 2021-05-16 PROCEDURE — 96374 THER/PROPH/DIAG INJ IV PUSH: CPT

## 2021-05-16 PROCEDURE — 71250 CT THORAX DX C-: CPT

## 2021-05-16 PROCEDURE — 99284 EMERGENCY DEPT VISIT MOD MDM: CPT

## 2021-05-16 PROCEDURE — 99284 EMERGENCY DEPT VISIT MOD MDM: CPT | Performed by: EMERGENCY MEDICINE

## 2021-05-16 PROCEDURE — G1004 CDSM NDSC: HCPCS

## 2021-05-16 RX ORDER — FAMOTIDINE 20 MG/1
20 TABLET, FILM COATED ORAL ONCE
Status: COMPLETED | OUTPATIENT
Start: 2021-05-16 | End: 2021-05-16

## 2021-05-16 RX ORDER — FAMOTIDINE 20 MG/1
20 TABLET, FILM COATED ORAL 2 TIMES DAILY
Qty: 30 TABLET | Refills: 0 | Status: SHIPPED | OUTPATIENT
Start: 2021-05-16 | End: 2022-02-11

## 2021-05-16 RX ADMIN — FAMOTIDINE 20 MG: 20 TABLET ORAL at 20:26

## 2021-05-16 RX ADMIN — GLUCAGON HYDROCHLORIDE 1 MG: KIT at 17:31

## 2021-05-16 NOTE — ED NOTES
Pt able to tolerate PO ginger ale  Dr Rossy Wilson at bedside  Pt reports that he still feels the object in his throat  Pt is able to belch       Isauro Blanco RN  05/16/21 [id number]

## 2021-05-17 NOTE — DISCHARGE INSTRUCTIONS
Information on keeping a soft diet has been included in these discharge instructions, it is recommended that you follow this for the next few days  You can also look up "soft diet" on the internet for further options  Return the ER if you find you are not able to tolerate food or drink  The number for GI doctor is included in these discharge instructions, call to be seen in the office for further evaluation and management  Take the Pepcid twice daily, this will help with irritation

## 2021-12-10 ENCOUNTER — TELEPHONE (OUTPATIENT)
Dept: PSYCHIATRY | Facility: CLINIC | Age: 31
End: 2021-12-10

## 2021-12-28 PROBLEM — Z87.09 HISTORY OF DEVIATED NASAL SEPTUM: Status: RESOLVED | Noted: 2019-12-30 | Resolved: 2021-12-28

## 2021-12-29 ENCOUNTER — OFFICE VISIT (OUTPATIENT)
Dept: FAMILY MEDICINE CLINIC | Facility: CLINIC | Age: 31
End: 2021-12-29

## 2021-12-29 VITALS
SYSTOLIC BLOOD PRESSURE: 138 MMHG | RESPIRATION RATE: 18 BRPM | HEIGHT: 67 IN | TEMPERATURE: 98.1 F | WEIGHT: 152.7 LBS | DIASTOLIC BLOOD PRESSURE: 78 MMHG | BODY MASS INDEX: 23.97 KG/M2 | OXYGEN SATURATION: 99 % | HEART RATE: 68 BPM

## 2021-12-29 DIAGNOSIS — G89.29 CHRONIC LEFT SHOULDER PAIN: Primary | ICD-10-CM

## 2021-12-29 DIAGNOSIS — M25.512 CHRONIC LEFT SHOULDER PAIN: Primary | ICD-10-CM

## 2021-12-29 PROCEDURE — 99213 OFFICE O/P EST LOW 20 MIN: CPT | Performed by: FAMILY MEDICINE

## 2021-12-29 PROCEDURE — 3078F DIAST BP <80 MM HG: CPT | Performed by: FAMILY MEDICINE

## 2021-12-29 PROCEDURE — 3075F SYST BP GE 130 - 139MM HG: CPT | Performed by: FAMILY MEDICINE

## 2022-01-10 ENCOUNTER — HOSPITAL ENCOUNTER (OUTPATIENT)
Dept: RADIOLOGY | Facility: HOSPITAL | Age: 32
Discharge: HOME | End: 2022-01-10
Payer: COMMERCIAL

## 2022-01-10 DIAGNOSIS — M25.551 CHRONIC RIGHT HIP PAIN: ICD-10-CM

## 2022-01-10 DIAGNOSIS — M25.512 CHRONIC LEFT SHOULDER PAIN: ICD-10-CM

## 2022-01-10 DIAGNOSIS — G89.29 CHRONIC LEFT SHOULDER PAIN: ICD-10-CM

## 2022-01-10 DIAGNOSIS — G89.29 CHRONIC RIGHT HIP PAIN: ICD-10-CM

## 2022-01-10 PROBLEM — Z00.00 HEALTHCARE MAINTENANCE: Status: ACTIVE | Noted: 2022-01-10

## 2022-01-10 PROBLEM — R07.81 PLEURODYNIA: Status: RESOLVED | Noted: 2019-02-08 | Resolved: 2022-01-10

## 2022-01-10 PROCEDURE — 73030 X-RAY EXAM OF SHOULDER: CPT

## 2022-01-10 NOTE — ASSESSMENT & PLAN NOTE
· Chronic, 6 months after falling on left shoulder when attempting a dive in the pool  · DDx: shoulder dislocation, rotator cuff tendinopathy,impingement syndrome  · Ordered Left X-ray of the Shoulder to confirm fracture  · Clinically no signs of dislocation after special tests perform  · As long as no fracture will plan for shoulder injection in 1 5 weeks   · Start agressive Physical Therapy; locations provided   · RTC to clinic in 1 5 weeks  If shoulder injection montes snot provide significant relief, will order MRI
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
## 2022-01-12 ENCOUNTER — OFFICE VISIT (OUTPATIENT)
Dept: FAMILY MEDICINE CLINIC | Facility: CLINIC | Age: 32
End: 2022-01-12

## 2022-01-12 VITALS
RESPIRATION RATE: 16 BRPM | TEMPERATURE: 97.9 F | BODY MASS INDEX: 24.33 KG/M2 | HEART RATE: 86 BPM | DIASTOLIC BLOOD PRESSURE: 80 MMHG | WEIGHT: 155 LBS | OXYGEN SATURATION: 97 % | HEIGHT: 67 IN | SYSTOLIC BLOOD PRESSURE: 122 MMHG

## 2022-01-12 DIAGNOSIS — G89.29 CHRONIC LEFT SHOULDER PAIN: Primary | ICD-10-CM

## 2022-01-12 DIAGNOSIS — M25.512 CHRONIC LEFT SHOULDER PAIN: Primary | ICD-10-CM

## 2022-01-12 PROCEDURE — 99214 OFFICE O/P EST MOD 30 MIN: CPT | Performed by: FAMILY MEDICINE

## 2022-01-12 PROCEDURE — 3074F SYST BP LT 130 MM HG: CPT | Performed by: FAMILY MEDICINE

## 2022-01-12 PROCEDURE — 3079F DIAST BP 80-89 MM HG: CPT | Performed by: FAMILY MEDICINE

## 2022-01-12 RX ORDER — CARBIDOPA AND LEVODOPA 25; 100 MG/1; MG/1
TABLET, ORALLY DISINTEGRATING ORAL
COMMUNITY
Start: 2021-12-28

## 2022-01-13 NOTE — RESULT ENCOUNTER NOTE
Please infrom Hemanth of negative shoulder x-ray  There is no fracture or dislocation  We will proceed with the corticosteroid injection at his next visit on 1/21/22

## 2022-01-28 ENCOUNTER — PROCEDURE VISIT (OUTPATIENT)
Dept: FAMILY MEDICINE CLINIC | Facility: CLINIC | Age: 32
End: 2022-01-28

## 2022-01-28 VITALS
DIASTOLIC BLOOD PRESSURE: 70 MMHG | HEIGHT: 67 IN | SYSTOLIC BLOOD PRESSURE: 138 MMHG | TEMPERATURE: 97.2 F | BODY MASS INDEX: 24.63 KG/M2 | HEART RATE: 101 BPM | WEIGHT: 156.9 LBS | OXYGEN SATURATION: 99 % | RESPIRATION RATE: 18 BRPM

## 2022-01-28 DIAGNOSIS — M25.512 CHRONIC LEFT SHOULDER PAIN: Primary | ICD-10-CM

## 2022-01-28 DIAGNOSIS — G89.29 CHRONIC LEFT SHOULDER PAIN: Primary | ICD-10-CM

## 2022-01-28 PROCEDURE — 20610 DRAIN/INJ JOINT/BURSA W/O US: CPT | Performed by: FAMILY MEDICINE

## 2022-01-28 PROCEDURE — 99214 OFFICE O/P EST MOD 30 MIN: CPT | Performed by: FAMILY MEDICINE

## 2022-01-28 PROCEDURE — 3078F DIAST BP <80 MM HG: CPT | Performed by: FAMILY MEDICINE

## 2022-01-28 PROCEDURE — 3075F SYST BP GE 130 - 139MM HG: CPT | Performed by: FAMILY MEDICINE

## 2022-01-28 RX ORDER — TRIAMCINOLONE ACETONIDE 40 MG/ML
80 INJECTION, SUSPENSION INTRA-ARTICULAR; INTRAMUSCULAR
Status: COMPLETED | OUTPATIENT
Start: 2022-01-28 | End: 2022-01-28

## 2022-01-28 RX ORDER — LIDOCAINE HYDROCHLORIDE 10 MG/ML
7 INJECTION, SOLUTION INFILTRATION; PERINEURAL
Status: COMPLETED | OUTPATIENT
Start: 2022-01-28 | End: 2022-01-28

## 2022-01-28 RX ADMIN — LIDOCAINE HYDROCHLORIDE 7 ML: 10 INJECTION, SOLUTION INFILTRATION; PERINEURAL at 15:45

## 2022-01-28 RX ADMIN — TRIAMCINOLONE ACETONIDE 80 MG: 40 INJECTION, SUSPENSION INTRA-ARTICULAR; INTRAMUSCULAR at 15:45

## 2022-01-28 NOTE — PROGRESS NOTES
Large joint arthrocentesis: L subacromial bursa  [place] Protocol:  Procedure performed by: (Dr Jordan Cueva)  Consent: Verbal consent obtained  Risks and benefits: risks, benefits and alternatives were discussed  Consent given by: patient  Patient understanding: patient states understanding of the procedure being performed  Test results: test results available and properly labeled  Site marked: the operative site was marked  Radiology Images displayed and confirmed  If images not available, report reviewed: imaging studies available  Patient identity confirmed: verbally with patient    Supporting Documentation  Indications: pain   Procedure Details  Location: shoulder - L subacromial bursa  Preparation: Patient was prepped and draped in the usual sterile fashion  Needle size: 25 G  Ultrasound guidance: no  Approach: posterolateral  Medications administered: 7 mL lidocaine 1 %; 80 mg triamcinolone acetonide 40 mg/mL    Aspirate: clear    Patient tolerance: patient tolerated the procedure well with no immediate complications  Dressing:  Sterile dressing applied    Left subacromial injection performed  Patient tolerated the procedure well  Advised the patient about warning signs such as swelling, redness, increase in the heat of the joint, or fever at that time to report to our office or to the emergency department immediately    Patient was explained that in the next 4 to 6 hours she will have pain relief secondary to anesthetic  She was advised that she may have increase in her pain within the next 6 to 24 hours as the anesthetic wears off  During this time she was advised to apply ice and use Tylenol for pain  Patient was explained that corticosteroid injection will kick in within 24 to 48 hours and her pain should significantly improve  Patient was advised that if her knee becomes hot, red, or swollen then she should return to the clinic as soon as possible or go to local ED for evaluation   Advised to follow up with Physical Therapy and will return the office in 2-4 weeks

## 2022-02-01 ENCOUNTER — TELEPHONE (OUTPATIENT)
Dept: FAMILY MEDICINE CLINIC | Facility: CLINIC | Age: 32
End: 2022-02-01

## 2022-02-01 NOTE — TELEPHONE ENCOUNTER
Pt called office today 02/01/22 to inform PCP and Clinical Staff that Sofia Miss Carol Amanda ( Therapist) will be calling our office and will ask to speak to Pt PCP or clinical Staff, in regards of requesting a Physical Therapy Referral & Speech Evaluation Referral      Pt provided Miss Carol Amanda contact # if further information is needed        # [phone]

## 2022-02-11 ENCOUNTER — HOSPITAL ENCOUNTER (EMERGENCY)
Facility: HOSPITAL | Age: 32
Discharge: HOME | End: 2022-02-11
Attending: EMERGENCY MEDICINE
Payer: COMMERCIAL

## 2022-02-11 VITALS
BODY MASS INDEX: 23.34 KG/M2 | OXYGEN SATURATION: 96 % | RESPIRATION RATE: 18 BRPM | TEMPERATURE: 97.4 F | DIASTOLIC BLOOD PRESSURE: 84 MMHG | WEIGHT: 149.03 LBS | SYSTOLIC BLOOD PRESSURE: 151 MMHG | HEART RATE: 94 BPM

## 2022-02-11 DIAGNOSIS — M89.8X1 PAIN OF LEFT SCAPULA: Primary | ICD-10-CM

## 2022-02-11 PROCEDURE — 96372 THER/PROPH/DIAG INJ SC/IM: CPT

## 2022-02-11 PROCEDURE — 99283 EMERGENCY DEPT VISIT LOW MDM: CPT

## 2022-02-11 PROCEDURE — 99284 EMERGENCY DEPT VISIT MOD MDM: CPT | Performed by: EMERGENCY MEDICINE

## 2022-02-11 RX ORDER — KETOROLAC TROMETHAMINE 30 MG/ML
30 INJECTION, SOLUTION INTRAMUSCULAR; INTRAVENOUS ONCE
Status: COMPLETED | OUTPATIENT
Start: 2022-02-11 | End: 2022-02-11

## 2022-02-11 RX ORDER — LIDOCAINE 50 MG/G
1 PATCH TOPICAL ONCE
Status: DISCONTINUED | OUTPATIENT
Start: 2022-02-11 | End: 2022-02-11 | Stop reason: HOSPADM

## 2022-02-11 RX ORDER — LIDOCAINE 50 MG/G
1 PATCH TOPICAL DAILY
Qty: 5 PATCH | Refills: 0 | Status: SHIPPED | OUTPATIENT
Start: 2022-02-11

## 2022-02-11 RX ORDER — IBUPROFEN 600 MG/1
600 TABLET ORAL EVERY 6 HOURS PRN
Qty: 30 TABLET | Refills: 0 | Status: SHIPPED | OUTPATIENT
Start: 2022-02-11

## 2022-02-11 RX ADMIN — KETOROLAC TROMETHAMINE 30 MG: 30 INJECTION, SOLUTION INTRAMUSCULAR at 14:51

## 2022-02-11 RX ADMIN — LIDOCAINE 1 PATCH: 50 PATCH CUTANEOUS at 14:54
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
## 2022-02-14 ENCOUNTER — DOCUMENTATION (OUTPATIENT)
Dept: FAMILY MEDICINE CLINIC | Facility: CLINIC | Age: 32
End: 2022-02-14

## 2022-02-14 DIAGNOSIS — G20 PARKINSON'S SYNDROME (HCC): Primary | ICD-10-CM

## 2022-02-14 NOTE — TELEPHONE ENCOUNTER
Both referral for physical and speech therapy has been faxed to [facility]     [address] [place] [address]

## 2022-02-16 ENCOUNTER — HOSPITAL ENCOUNTER (EMERGENCY)
Facility: HOSPITAL | Age: 32
Discharge: HOME | End: 2022-02-16
Attending: EMERGENCY MEDICINE | Admitting: EMERGENCY MEDICINE
Payer: COMMERCIAL

## 2022-02-16 ENCOUNTER — APPOINTMENT (EMERGENCY)
Dept: RADIOLOGY | Facility: HOSPITAL | Age: 32
End: 2022-02-16
Payer: COMMERCIAL

## 2022-02-16 VITALS
DIASTOLIC BLOOD PRESSURE: 58 MMHG | TEMPERATURE: 98.3 F | WEIGHT: 152.12 LBS | BODY MASS INDEX: 23.82 KG/M2 | RESPIRATION RATE: 37 BRPM | SYSTOLIC BLOOD PRESSURE: 126 MMHG | HEART RATE: 92 BPM | OXYGEN SATURATION: 97 %

## 2022-02-16 DIAGNOSIS — F12.90 MARIJUANA USE: ICD-10-CM

## 2022-02-16 DIAGNOSIS — F41.9 ANXIETY: ICD-10-CM

## 2022-02-16 DIAGNOSIS — R07.9 CHEST PAIN: Primary | ICD-10-CM

## 2022-02-16 DIAGNOSIS — E86.0 DEHYDRATION: ICD-10-CM

## 2022-02-16 DIAGNOSIS — R00.0 SINUS TACHYCARDIA: ICD-10-CM

## 2022-02-16 LAB
ALBUMIN SERPL BCP-MCNC: 4.5 G/DL (ref 3.5–5)
ALP SERPL-CCNC: 49 U/L (ref 46–116)
ALT SERPL W P-5'-P-CCNC: 11 U/L (ref 12–78)
AMPHETAMINES SERPL QL SCN: NEGATIVE
ANION GAP SERPL CALCULATED.3IONS-SCNC: 7 MMOL/L (ref 4–13)
AST SERPL W P-5'-P-CCNC: 13 U/L (ref 5–45)
ATRIAL RATE: 129 BPM
BARBITURATES UR QL: NEGATIVE
BASOPHILS # BLD AUTO: 0.03 THOUSANDS/ΜL (ref 0–0.1)
BASOPHILS NFR BLD AUTO: 0 % (ref 0–1)
BENZODIAZ UR QL: NEGATIVE
BILIRUB DIRECT SERPL-MCNC: 0.1 MG/DL (ref 0–0.2)
BILIRUB SERPL-MCNC: 0.47 MG/DL (ref 0.2–1)
BILIRUB UR QL STRIP: NEGATIVE
BUN SERPL-MCNC: 10 MG/DL (ref 5–25)
CALCIUM SERPL-MCNC: 9.1 MG/DL (ref 8.3–10.1)
CARDIAC TROPONIN I PNL SERPL HS: 2 NG/L
CHLORIDE SERPL-SCNC: 102 MMOL/L (ref 100–108)
CLARITY UR: CLEAR
CO2 SERPL-SCNC: 28 MMOL/L (ref 21–32)
COCAINE UR QL: NEGATIVE
COLOR UR: YELLOW
CREAT SERPL-MCNC: 0.92 MG/DL (ref 0.6–1.3)
D DIMER PPP FEU-MCNC: 0.27 UG/ML FEU
EOSINOPHIL # BLD AUTO: 0.09 THOUSAND/ΜL (ref 0–0.61)
EOSINOPHIL NFR BLD AUTO: 1 % (ref 0–6)
ERYTHROCYTE [DISTWIDTH] IN BLOOD BY AUTOMATED COUNT: 11.9 % (ref 11.6–15.1)
FLUAV RNA RESP QL NAA+PROBE: NEGATIVE
FLUBV RNA RESP QL NAA+PROBE: NEGATIVE
GFR SERPL CREATININE-BSD FRML MDRD: 108 ML/MIN/1.73SQ M
GLUCOSE SERPL-MCNC: 107 MG/DL (ref 65–140)
GLUCOSE SERPL-MCNC: 115 MG/DL (ref 65–140)
GLUCOSE UR STRIP-MCNC: NEGATIVE MG/DL
HCT VFR BLD AUTO: 44.1 % (ref 36.5–49.3)
HGB BLD-MCNC: 15.3 G/DL (ref 12–17)
HGB UR QL STRIP.AUTO: NEGATIVE
IMM GRANULOCYTES # BLD AUTO: 0.01 THOUSAND/UL (ref 0–0.2)
IMM GRANULOCYTES NFR BLD AUTO: 0 % (ref 0–2)
KETONES UR STRIP-MCNC: ABNORMAL MG/DL
LEUKOCYTE ESTERASE UR QL STRIP: NEGATIVE
LYMPHOCYTES # BLD AUTO: 2.78 THOUSANDS/ΜL (ref 0.6–4.47)
LYMPHOCYTES NFR BLD AUTO: 42 % (ref 14–44)
MCH RBC QN AUTO: 32.3 PG (ref 26.8–34.3)
MCHC RBC AUTO-ENTMCNC: 34.7 G/DL (ref 31.4–37.4)
MCV RBC AUTO: 93 FL (ref 82–98)
METHADONE UR QL: NEGATIVE
MONOCYTES # BLD AUTO: 0.56 THOUSAND/ΜL (ref 0.17–1.22)
MONOCYTES NFR BLD AUTO: 8 % (ref 4–12)
NEUTROPHILS # BLD AUTO: 3.2 THOUSANDS/ΜL (ref 1.85–7.62)
NEUTS SEG NFR BLD AUTO: 49 % (ref 43–75)
NITRITE UR QL STRIP: NEGATIVE
NRBC BLD AUTO-RTO: 0 /100 WBCS
OPIATES UR QL SCN: NEGATIVE
OXYCODONE+OXYMORPHONE UR QL SCN: NEGATIVE
P AXIS: 61 DEGREES
PCP UR QL: NEGATIVE
PH UR STRIP.AUTO: 6 [PH] (ref 4.5–8)
PLATELET # BLD AUTO: 316 THOUSANDS/UL (ref 149–390)
PMV BLD AUTO: 9.3 FL (ref 8.9–12.7)
POTASSIUM SERPL-SCNC: 3.8 MMOL/L (ref 3.5–5.3)
PR INTERVAL: 130 MS
PROT SERPL-MCNC: 7.4 G/DL (ref 6.4–8.2)
PROT UR STRIP-MCNC: NEGATIVE MG/DL
QRS AXIS: 98 DEGREES
QRSD INTERVAL: 92 MS
QT INTERVAL: 296 MS
QTC INTERVAL: 433 MS
RBC # BLD AUTO: 4.74 MILLION/UL (ref 3.88–5.62)
RSV RNA RESP QL NAA+PROBE: NEGATIVE
SARS-COV-2 RNA RESP QL NAA+PROBE: NEGATIVE
SODIUM SERPL-SCNC: 137 MMOL/L (ref 136–145)
SP GR UR STRIP.AUTO: 1.02 (ref 1–1.03)
T WAVE AXIS: 57 DEGREES
THC UR QL: POSITIVE
TSH SERPL DL<=0.05 MIU/L-ACNC: 0.48 UIU/ML (ref 0.36–3.74)
UROBILINOGEN UR QL STRIP.AUTO: 0.2 E.U./DL
VENTRICULAR RATE: 129 BPM
WBC # BLD AUTO: 6.67 THOUSAND/UL (ref 4.31–10.16)

## 2022-02-16 PROCEDURE — 93005 ELECTROCARDIOGRAM TRACING: CPT

## 2022-02-16 PROCEDURE — 93010 ELECTROCARDIOGRAM REPORT: CPT | Performed by: INTERNAL MEDICINE

## 2022-02-16 PROCEDURE — 82948 REAGENT STRIP/BLOOD GLUCOSE: CPT

## 2022-02-16 PROCEDURE — 85025 COMPLETE CBC W/AUTO DIFF WBC: CPT | Performed by: PHYSICIAN ASSISTANT

## 2022-02-16 PROCEDURE — 80307 DRUG TEST PRSMV CHEM ANLYZR: CPT | Performed by: PHYSICIAN ASSISTANT

## 2022-02-16 PROCEDURE — 80048 BASIC METABOLIC PNL TOTAL CA: CPT | Performed by: PHYSICIAN ASSISTANT

## 2022-02-16 PROCEDURE — 85379 FIBRIN DEGRADATION QUANT: CPT | Performed by: PHYSICIAN ASSISTANT

## 2022-02-16 PROCEDURE — 99285 EMERGENCY DEPT VISIT HI MDM: CPT | Performed by: PHYSICIAN ASSISTANT

## 2022-02-16 PROCEDURE — 36415 COLL VENOUS BLD VENIPUNCTURE: CPT | Performed by: PHYSICIAN ASSISTANT

## 2022-02-16 PROCEDURE — 84484 ASSAY OF TROPONIN QUANT: CPT | Performed by: PHYSICIAN ASSISTANT

## 2022-02-16 PROCEDURE — 99285 EMERGENCY DEPT VISIT HI MDM: CPT

## 2022-02-16 PROCEDURE — 84443 ASSAY THYROID STIM HORMONE: CPT | Performed by: PHYSICIAN ASSISTANT

## 2022-02-16 PROCEDURE — 71045 X-RAY EXAM CHEST 1 VIEW: CPT

## 2022-02-16 PROCEDURE — 96374 THER/PROPH/DIAG INJ IV PUSH: CPT

## 2022-02-16 PROCEDURE — 80076 HEPATIC FUNCTION PANEL: CPT | Performed by: PHYSICIAN ASSISTANT

## 2022-02-16 PROCEDURE — 96361 HYDRATE IV INFUSION ADD-ON: CPT

## 2022-02-16 PROCEDURE — 0241U HB NFCT DS VIR RESP RNA 4 TRGT: CPT | Performed by: PHYSICIAN ASSISTANT

## 2022-02-16 RX ORDER — CARBIDOPA AND LEVODOPA 25; 100 MG/1; MG/1
2 TABLET, ORALLY DISINTEGRATING ORAL ONCE
Status: COMPLETED | OUTPATIENT
Start: 2022-02-16 | End: 2022-02-16

## 2022-02-16 RX ORDER — LORAZEPAM 2 MG/ML
0.5 INJECTION INTRAMUSCULAR ONCE
Status: COMPLETED | OUTPATIENT
Start: 2022-02-16 | End: 2022-02-16

## 2022-02-16 RX ADMIN — SODIUM CHLORIDE 1000 ML: 0.9 INJECTION, SOLUTION INTRAVENOUS at 11:30

## 2022-02-16 RX ADMIN — LORAZEPAM 0.5 MG: 2 INJECTION INTRAMUSCULAR; INTRAVENOUS at 11:57

## 2022-02-16 RX ADMIN — CARBIDOPA AND LEVODOPA 2 TABLET: 25; 100 TABLET, ORALLY DISINTEGRATING ORAL at 13:11
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
## 2022-02-16 NOTE — ED NOTES
Patient from home with caregiver at the bedside - hx of parkinson's disease for which patient states he is non-compliant with timing regimen of carvedopa - levodopa - reports that the medication leads him to be constipated  Reports taking today  States he also takes medical marijuana for comfort use - and reports smoking sativa earlier today  Present complaints of racing heart, tightness in his upper abdomen, dry mouth       Janna Ashton RN  02/16/22 2133

## 2022-02-28 ENCOUNTER — TELEPHONE (OUTPATIENT)
Dept: OTHER | Facility: OTHER | Age: 32
End: 2022-02-28

## 2022-02-28 NOTE — TELEPHONE ENCOUNTER
Lea from [address] [place] [address], called requesting a call back from patient's pcp, regarding an order needed for speech therapy

## 2022-03-09 ENCOUNTER — TELEPHONE (OUTPATIENT)
Dept: FAMILY MEDICINE CLINIC | Facility: CLINIC | Age: 32
End: 2022-03-09

## 2022-03-09 NOTE — TELEPHONE ENCOUNTER
Devyn left a vm on nurse line requesting to have a script for dysphagia evaluation and speech therapy      Their fax number is [phone]

## 2022-04-04 ENCOUNTER — OFFICE VISIT (OUTPATIENT)
Dept: FAMILY MEDICINE CLINIC | Facility: CLINIC | Age: 32
End: 2022-04-04

## 2022-04-04 VITALS
RESPIRATION RATE: 16 BRPM | BODY MASS INDEX: 23.54 KG/M2 | WEIGHT: 150 LBS | OXYGEN SATURATION: 99 % | HEART RATE: 89 BPM | HEIGHT: 67 IN | SYSTOLIC BLOOD PRESSURE: 122 MMHG | DIASTOLIC BLOOD PRESSURE: 84 MMHG | TEMPERATURE: 98 F

## 2022-04-04 DIAGNOSIS — M25.512 CHRONIC LEFT SHOULDER PAIN: ICD-10-CM

## 2022-04-04 DIAGNOSIS — G89.29 CHRONIC LEFT SHOULDER PAIN: ICD-10-CM

## 2022-04-04 DIAGNOSIS — M25.511 CHRONIC RIGHT SHOULDER PAIN: Primary | ICD-10-CM

## 2022-04-04 DIAGNOSIS — G89.29 CHRONIC RIGHT SHOULDER PAIN: Primary | ICD-10-CM

## 2022-04-04 PROCEDURE — 99213 OFFICE O/P EST LOW 20 MIN: CPT | Performed by: INTERNAL MEDICINE

## 2022-04-04 PROCEDURE — 3074F SYST BP LT 130 MM HG: CPT | Performed by: INTERNAL MEDICINE

## 2022-04-04 PROCEDURE — 3079F DIAST BP 80-89 MM HG: CPT | Performed by: INTERNAL MEDICINE

## 2022-04-04 RX ORDER — CYCLOBENZAPRINE HCL 10 MG
10 TABLET ORAL 3 TIMES DAILY PRN
Qty: 30 TABLET | Refills: 1 | Status: SHIPPED | OUTPATIENT
Start: 2022-04-04
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
## 2022-04-05 NOTE — ASSESSMENT & PLAN NOTE
1 month of worsening bilateral shoulder pain and neck stiffness likely 2/2 worsening Parkinsonism   Patient counseled once again on importance of initiating physical therapy in promotion of strength and healing      - Ambulatory referral to PT  - Ambulatory referral to orthopedics     - Flexeril 10 mg TID PRN to reduce muscle tension

## 2022-05-05 ENCOUNTER — TELEPHONE (OUTPATIENT)
Dept: FAMILY MEDICINE CLINIC | Facility: CLINIC | Age: 32
End: 2022-05-05

## 2022-05-06 ENCOUNTER — TELEPHONE (OUTPATIENT)
Dept: FAMILY MEDICINE CLINIC | Facility: CLINIC | Age: 32
End: 2022-05-06

## 2022-05-06 NOTE — TELEPHONE ENCOUNTER
PCP SIGNATURE NEEDED FOR [facility]  FORM RECEIVED VIA FAX AND PLACED IN PCP FOLDER TO BE DELIVERED AT ASSIGNED TIMES

## 2022-05-23 ENCOUNTER — OFFICE VISIT (OUTPATIENT)
Dept: OBGYN CLINIC | Facility: MEDICAL CENTER | Age: 32
End: 2022-05-23
Payer: COMMERCIAL

## 2022-05-23 VITALS
SYSTOLIC BLOOD PRESSURE: 132 MMHG | WEIGHT: 148.2 LBS | BODY MASS INDEX: 23.26 KG/M2 | DIASTOLIC BLOOD PRESSURE: 80 MMHG | HEIGHT: 67 IN

## 2022-05-23 DIAGNOSIS — M25.512 CHRONIC PAIN OF BOTH SHOULDERS: ICD-10-CM

## 2022-05-23 DIAGNOSIS — M25.511 CHRONIC PAIN OF BOTH SHOULDERS: ICD-10-CM

## 2022-05-23 DIAGNOSIS — G89.29 CHRONIC PAIN OF BOTH SHOULDERS: ICD-10-CM

## 2022-05-23 DIAGNOSIS — M25.311 INSTABILITY OF BOTH SHOULDER JOINTS: ICD-10-CM

## 2022-05-23 DIAGNOSIS — M25.312 INSTABILITY OF BOTH SHOULDER JOINTS: ICD-10-CM

## 2022-05-23 PROCEDURE — 99203 OFFICE O/P NEW LOW 30 MIN: CPT | Performed by: ORTHOPAEDIC SURGERY

## 2022-05-23 NOTE — LETTER
May 23, 2022     MD [address]  [facility]  [address]  [id number]    Patient: Lamberto Garcia   YOB: 1990   Date of Visit: 5/23/2022       Dear Dr Brady Clark: Thank you for referring Lamberto Garcia to me for evaluation  Below are my notes for this consultation  If you have questions, please do not hesitate to call me  I look forward to following your patient along with you           Sincerely,        Savilla Rubinstein,         CC: No Recipients
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
## 2022-05-24 ENCOUNTER — TELEPHONE (OUTPATIENT)
Dept: FAMILY MEDICINE CLINIC | Facility: CLINIC | Age: 32
End: 2022-05-24

## 2022-05-25 DIAGNOSIS — R63.4 WEIGHT LOSS: ICD-10-CM

## 2022-05-25 DIAGNOSIS — G20 PARKINSON'S SYNDROME (HCC): Primary | ICD-10-CM

## 2022-06-07 NOTE — NURSING NOTE
Call placed to patient to discuss upcoming left shoulder MRI arthrogram at [facility]  Allergies reviewed and verified patient does not currently take any anticoagulant medications  Pre procedure instructions including diet and taking own medications discussed with patient  Patient instructed that he may eat normally and take medications as usual before the procedure  Procedure and post procedure expectations and instructions reviewed with the patient  Patient verbalizes understanding and denies any questions at this time  Reminded of the location, date and time of the expected procedure

## 2022-06-15 ENCOUNTER — HOSPITAL ENCOUNTER (OUTPATIENT)
Dept: RADIOLOGY | Facility: HOSPITAL | Age: 32
Discharge: HOME | End: 2022-06-15
Admitting: RADIOLOGY
Payer: COMMERCIAL

## 2022-06-15 ENCOUNTER — HOSPITAL ENCOUNTER (OUTPATIENT)
Dept: MRI IMAGING | Facility: HOSPITAL | Age: 32
Discharge: HOME | End: 2022-06-15
Payer: COMMERCIAL

## 2022-06-15 DIAGNOSIS — M25.312 INSTABILITY OF BOTH SHOULDER JOINTS: ICD-10-CM

## 2022-06-15 DIAGNOSIS — M25.311 INSTABILITY OF BOTH SHOULDER JOINTS: ICD-10-CM

## 2022-06-15 PROCEDURE — 23350 INJECTION FOR SHOULDER X-RAY: CPT

## 2022-06-15 PROCEDURE — A9585 GADOBUTROL INJECTION: HCPCS | Performed by: ORTHOPAEDIC SURGERY

## 2022-06-15 PROCEDURE — G1004 CDSM NDSC: HCPCS

## 2022-06-15 PROCEDURE — 73222 MRI JOINT UPR EXTREM W/DYE: CPT

## 2022-06-15 PROCEDURE — 77002 NEEDLE LOCALIZATION BY XRAY: CPT

## 2022-06-15 RX ORDER — LIDOCAINE WITH 8.4% SOD BICARB 0.9%(10ML)
3 SYRINGE (ML) INJECTION ONCE
Status: DISCONTINUED | OUTPATIENT
Start: 2022-06-15 | End: 2022-06-16 | Stop reason: HOSPADM

## 2022-06-15 RX ADMIN — GADOBUTROL 3 ML: 604.72 INJECTION INTRAVENOUS at 15:35

## 2022-06-15 RX ADMIN — IOHEXOL 3 ML: 300 INJECTION, SOLUTION INTRAVENOUS at 15:37

## 2022-06-22 ENCOUNTER — HOSPITAL ENCOUNTER (OUTPATIENT)
Dept: MRI IMAGING | Facility: HOSPITAL | Age: 32
Discharge: HOME | End: 2022-06-22
Payer: COMMERCIAL

## 2022-06-22 ENCOUNTER — HOSPITAL ENCOUNTER (OUTPATIENT)
Dept: RADIOLOGY | Facility: HOSPITAL | Age: 32
Discharge: HOME | End: 2022-06-22
Admitting: RADIOLOGY
Payer: COMMERCIAL

## 2022-06-22 DIAGNOSIS — M25.311 INSTABILITY OF BOTH SHOULDER JOINTS: ICD-10-CM

## 2022-06-22 DIAGNOSIS — M25.312 INSTABILITY OF BOTH SHOULDER JOINTS: ICD-10-CM

## 2022-06-22 PROCEDURE — 77002 NEEDLE LOCALIZATION BY XRAY: CPT

## 2022-06-22 PROCEDURE — A9585 GADOBUTROL INJECTION: HCPCS | Performed by: ORTHOPAEDIC SURGERY

## 2022-06-22 PROCEDURE — G1004 CDSM NDSC: HCPCS

## 2022-06-22 PROCEDURE — 73222 MRI JOINT UPR EXTREM W/DYE: CPT

## 2022-06-22 PROCEDURE — 23350 INJECTION FOR SHOULDER X-RAY: CPT

## 2022-06-22 RX ORDER — LIDOCAINE HYDROCHLORIDE 10 MG/ML
7 INJECTION, SOLUTION EPIDURAL; INFILTRATION; INTRACAUDAL; PERINEURAL ONCE
Status: DISCONTINUED | OUTPATIENT
Start: 2022-06-22 | End: 2022-06-23 | Stop reason: HOSPADM

## 2022-06-22 RX ADMIN — IOHEXOL 3 ML: 300 INJECTION, SOLUTION INTRAVENOUS at 15:10

## 2022-06-22 RX ADMIN — GADOBUTROL 0.2 ML: 604.72 INJECTION INTRAVENOUS at 15:10

## 2022-06-27 ENCOUNTER — OFFICE VISIT (OUTPATIENT)
Dept: OBGYN CLINIC | Facility: MEDICAL CENTER | Age: 32
End: 2022-06-27
Payer: COMMERCIAL

## 2022-06-27 VITALS
DIASTOLIC BLOOD PRESSURE: 66 MMHG | WEIGHT: 146.2 LBS | SYSTOLIC BLOOD PRESSURE: 105 MMHG | BODY MASS INDEX: 22.95 KG/M2 | HEIGHT: 67 IN

## 2022-06-27 DIAGNOSIS — M25.312 INSTABILITY OF BOTH SHOULDER JOINTS: Primary | ICD-10-CM

## 2022-06-27 DIAGNOSIS — M25.311 INSTABILITY OF BOTH SHOULDER JOINTS: Primary | ICD-10-CM

## 2022-06-27 PROCEDURE — 99213 OFFICE O/P EST LOW 20 MIN: CPT | Performed by: ORTHOPAEDIC SURGERY
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
## 2022-08-18 ENCOUNTER — OFFICE VISIT (OUTPATIENT)
Dept: OBGYN CLINIC | Facility: CLINIC | Age: 32
End: 2022-08-18
Payer: COMMERCIAL

## 2022-08-18 VITALS
WEIGHT: 145 LBS | DIASTOLIC BLOOD PRESSURE: 62 MMHG | SYSTOLIC BLOOD PRESSURE: 100 MMHG | HEART RATE: 98 BPM | OXYGEN SATURATION: 98 % | HEIGHT: 67 IN | BODY MASS INDEX: 22.76 KG/M2

## 2022-08-18 DIAGNOSIS — S43.432A SUPERIOR GLENOID LABRUM LESION OF LEFT SHOULDER, INITIAL ENCOUNTER: Primary | ICD-10-CM

## 2022-08-18 DIAGNOSIS — S43.431A TEAR OF RIGHT GLENOID LABRUM, INITIAL ENCOUNTER: ICD-10-CM

## 2022-08-18 DIAGNOSIS — S43.432A TEAR OF LEFT GLENOID LABRUM, INITIAL ENCOUNTER: ICD-10-CM

## 2022-08-18 PROCEDURE — 99214 OFFICE O/P EST MOD 30 MIN: CPT | Performed by: ORTHOPAEDIC SURGERY

## 2022-08-18 RX ORDER — CEFAZOLIN SODIUM 2 G/50ML
2000 SOLUTION INTRAVENOUS ONCE
Status: CANCELLED | OUTPATIENT
Start: 2022-09-19 | End: 2022-08-18

## 2022-08-18 RX ORDER — CHLORHEXIDINE GLUCONATE 4 G/100ML
SOLUTION TOPICAL DAILY PRN
Status: CANCELLED | OUTPATIENT
Start: 2022-08-18

## 2022-08-18 RX ORDER — CHLORHEXIDINE GLUCONATE 0.12 MG/ML
15 RINSE ORAL ONCE
Status: CANCELLED | OUTPATIENT
Start: 2022-08-18 | End: 2022-08-18
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
## 2022-08-24 ENCOUNTER — TELEPHONE (OUTPATIENT)
Dept: OBGYN CLINIC | Facility: MEDICAL CENTER | Age: 32
End: 2022-08-24

## 2022-08-24 NOTE — TELEPHONE ENCOUNTER
Patient sees Dr Christina Santos  Patient calling about FMLA paperwork for his girlfriend, she will be his care taker and she is asking how many weeks would he be out  She has several questions that need to be answered and submitted to her company        [employer] # [phone]

## 2022-08-24 NOTE — TELEPHONE ENCOUNTER
Spoke to pt and explained we can approve 6 wks for care givers  Pt will have his g/f call us with any other questions

## 2022-09-15 ENCOUNTER — LAB (OUTPATIENT)
Dept: LAB | Facility: HOSPITAL | Age: 32
End: 2022-09-15
Payer: COMMERCIAL

## 2022-09-15 ENCOUNTER — APPOINTMENT (OUTPATIENT)
Dept: LAB | Facility: HOSPITAL | Age: 32
End: 2022-09-15
Payer: COMMERCIAL

## 2022-09-15 ENCOUNTER — HOSPITAL ENCOUNTER (OUTPATIENT)
Dept: RADIOLOGY | Facility: HOSPITAL | Age: 32
Discharge: HOME | End: 2022-09-15
Payer: COMMERCIAL

## 2022-09-15 DIAGNOSIS — S43.432A TEAR OF LEFT GLENOID LABRUM, INITIAL ENCOUNTER: ICD-10-CM

## 2022-09-15 LAB
ALBUMIN SERPL BCP-MCNC: 4.2 G/DL (ref 3.5–5)
ALP SERPL-CCNC: 45 U/L (ref 46–116)
ALT SERPL W P-5'-P-CCNC: 11 U/L (ref 12–78)
ANION GAP SERPL CALCULATED.3IONS-SCNC: 7 MMOL/L (ref 4–13)
AST SERPL W P-5'-P-CCNC: 19 U/L (ref 5–45)
BASOPHILS # BLD AUTO: 0.06 THOUSANDS/ΜL (ref 0–0.1)
BASOPHILS NFR BLD AUTO: 1 % (ref 0–1)
BILIRUB SERPL-MCNC: 0.5 MG/DL (ref 0.2–1)
BUN SERPL-MCNC: 10 MG/DL (ref 5–25)
CALCIUM SERPL-MCNC: 9.3 MG/DL (ref 8.3–10.1)
CHLORIDE SERPL-SCNC: 102 MMOL/L (ref 96–108)
CO2 SERPL-SCNC: 32 MMOL/L (ref 21–32)
CREAT SERPL-MCNC: 0.73 MG/DL (ref 0.6–1.3)
EOSINOPHIL # BLD AUTO: 0.17 THOUSAND/ΜL (ref 0–0.61)
EOSINOPHIL NFR BLD AUTO: 3 % (ref 0–6)
ERYTHROCYTE [DISTWIDTH] IN BLOOD BY AUTOMATED COUNT: 11.6 % (ref 11.6–15.1)
GFR SERPL CREATININE-BSD FRML MDRD: 121 ML/MIN/1.73SQ M
GLUCOSE SERPL-MCNC: 95 MG/DL (ref 65–140)
HCT VFR BLD AUTO: 42.5 % (ref 36.5–49.3)
HGB BLD-MCNC: 14.7 G/DL (ref 12–17)
IMM GRANULOCYTES # BLD AUTO: 0.01 THOUSAND/UL (ref 0–0.2)
IMM GRANULOCYTES NFR BLD AUTO: 0 % (ref 0–2)
INR PPP: 0.96 (ref 0.84–1.19)
LYMPHOCYTES # BLD AUTO: 1.51 THOUSANDS/ΜL (ref 0.6–4.47)
LYMPHOCYTES NFR BLD AUTO: 28 % (ref 14–44)
MCH RBC QN AUTO: 33 PG (ref 26.8–34.3)
MCHC RBC AUTO-ENTMCNC: 34.6 G/DL (ref 31.4–37.4)
MCV RBC AUTO: 95 FL (ref 82–98)
MONOCYTES # BLD AUTO: 0.39 THOUSAND/ΜL (ref 0.17–1.22)
MONOCYTES NFR BLD AUTO: 7 % (ref 4–12)
NEUTROPHILS # BLD AUTO: 3.23 THOUSANDS/ΜL (ref 1.85–7.62)
NEUTS SEG NFR BLD AUTO: 61 % (ref 43–75)
NRBC BLD AUTO-RTO: 0 /100 WBCS
PLATELET # BLD AUTO: 286 THOUSANDS/UL (ref 149–390)
PMV BLD AUTO: 9.4 FL (ref 8.9–12.7)
POTASSIUM SERPL-SCNC: 4.2 MMOL/L (ref 3.5–5.3)
PROT SERPL-MCNC: 7.8 G/DL (ref 6.4–8.4)
PROTHROMBIN TIME: 12.7 SECONDS (ref 11.6–14.5)
RBC # BLD AUTO: 4.46 MILLION/UL (ref 3.88–5.62)
SODIUM SERPL-SCNC: 141 MMOL/L (ref 135–147)
WBC # BLD AUTO: 5.37 THOUSAND/UL (ref 4.31–10.16)

## 2022-09-15 PROCEDURE — 36415 COLL VENOUS BLD VENIPUNCTURE: CPT

## 2022-09-15 PROCEDURE — 85025 COMPLETE CBC W/AUTO DIFF WBC: CPT

## 2022-09-15 PROCEDURE — 71046 X-RAY EXAM CHEST 2 VIEWS: CPT

## 2022-09-15 PROCEDURE — 85610 PROTHROMBIN TIME: CPT

## 2022-09-15 PROCEDURE — 80053 COMPREHEN METABOLIC PANEL: CPT

## 2022-09-15 PROCEDURE — 93005 ELECTROCARDIOGRAM TRACING: CPT

## 2022-09-16 LAB
ATRIAL RATE: 61 BPM
P AXIS: 91 DEGREES
PR INTERVAL: 124 MS
QRS AXIS: 79 DEGREES
QRSD INTERVAL: 88 MS
QT INTERVAL: 374 MS
QTC INTERVAL: 376 MS
T WAVE AXIS: 65 DEGREES
VENTRICULAR RATE: 61 BPM

## 2022-09-16 PROCEDURE — 93010 ELECTROCARDIOGRAM REPORT: CPT | Performed by: STUDENT IN AN ORGANIZED HEALTH CARE EDUCATION/TRAINING PROGRAM

## 2022-09-18 ENCOUNTER — TELEPHONE (OUTPATIENT)
Dept: OTHER | Facility: OTHER | Age: 32
End: 2022-09-18

## 2022-09-18 NOTE — TELEPHONE ENCOUNTER
PT  Called in stating he has been thinking about it and does not wish to go through with shoulder surgery tomorrow 09/19  He also states he was told physical therapy was a suggestion before surgery and PT would like to start that  Please call back

## 2022-09-20 ENCOUNTER — TELEPHONE (OUTPATIENT)
Dept: OBGYN CLINIC | Facility: HOSPITAL | Age: 32
End: 2022-09-20

## 2022-09-20 DIAGNOSIS — M25.311 INSTABILITY OF BOTH SHOULDER JOINTS: ICD-10-CM

## 2022-09-20 DIAGNOSIS — S43.431A TEAR OF RIGHT GLENOID LABRUM, INITIAL ENCOUNTER: Primary | ICD-10-CM

## 2022-09-20 DIAGNOSIS — M25.312 INSTABILITY OF BOTH SHOULDER JOINTS: ICD-10-CM

## 2022-09-20 NOTE — TELEPHONE ENCOUNTER
Spoke to patient  He stated he is having a lot of pain in his shoulder blades and this has been ongoing so he wants xrays of them? Stated he decided not to do the surgery but needs to know what is going on in the shoulder blade  L side worse than R  Should we schedule an appointment to evaluate the shoulder blades specifically? Please advise

## 2022-09-20 NOTE — TELEPHONE ENCOUNTER
Spoke to patient  He stated he knows this is bone related and he is demanding xrays  Stated when he lifts his arms they crack and pop and he knows it is bone related and this has been going on for months  Advised that if he wants xrays he would need an evaluation to determine the need for imaging  Scheduled for next week at his request      He does want the PT script sent to [facility] at fax # [phone]  Please fax and be advised

## 2022-09-20 NOTE — TELEPHONE ENCOUNTER
Shoulder blade pain is mostly likely from rhomboid muscles and his instability in the shoulders  Physical therapy would help this the most  We have previous imaging which demonstrates no findings at the scapula

## 2022-09-20 NOTE — TELEPHONE ENCOUNTER
I ordered the PT  He does not need CT scans, we have the MRI and he does not have significant bone loss

## 2022-09-20 NOTE — TELEPHONE ENCOUNTER
DR Phillips Karen  RE: Several months of therapy  CB:     Patient called asking if he can prescribed a referral for Physical Therapy  He cancelled surgery and he would like to try therapy first for a few months and if he could received a CT scan for B/L shoulders   Caller asked to speak to clinical team

## 2022-09-26 ENCOUNTER — TELEPHONE (OUTPATIENT)
Dept: OBGYN CLINIC | Facility: CLINIC | Age: 32
End: 2022-09-26

## 2022-09-27 ENCOUNTER — OFFICE VISIT (OUTPATIENT)
Dept: OBGYN CLINIC | Facility: MEDICAL CENTER | Age: 32
End: 2022-09-27
Payer: COMMERCIAL

## 2022-09-27 VITALS
DIASTOLIC BLOOD PRESSURE: 68 MMHG | WEIGHT: 147.8 LBS | BODY MASS INDEX: 23.2 KG/M2 | RESPIRATION RATE: 17 BRPM | HEIGHT: 67 IN | HEART RATE: 70 BPM | SYSTOLIC BLOOD PRESSURE: 134 MMHG

## 2022-09-27 DIAGNOSIS — M25.311 INSTABILITY OF BOTH SHOULDER JOINTS: ICD-10-CM

## 2022-09-27 DIAGNOSIS — S43.431A TEAR OF RIGHT GLENOID LABRUM, INITIAL ENCOUNTER: ICD-10-CM

## 2022-09-27 DIAGNOSIS — S43.432A SUPERIOR GLENOID LABRUM LESION OF LEFT SHOULDER, INITIAL ENCOUNTER: Primary | ICD-10-CM

## 2022-09-27 DIAGNOSIS — G25.89 SCAPULAR DYSKINESIS: ICD-10-CM

## 2022-09-27 DIAGNOSIS — M25.312 INSTABILITY OF BOTH SHOULDER JOINTS: ICD-10-CM

## 2022-09-27 PROCEDURE — 99214 OFFICE O/P EST MOD 30 MIN: CPT | Performed by: ORTHOPAEDIC SURGERY
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
## 2022-10-12 PROBLEM — Z00.00 HEALTHCARE MAINTENANCE: Status: RESOLVED | Noted: 2022-01-10 | Resolved: 2022-10-12

## 2023-02-08 ENCOUNTER — OFFICE VISIT (OUTPATIENT)
Dept: OBGYN CLINIC | Facility: CLINIC | Age: 33
End: 2023-02-08

## 2023-02-08 VITALS
HEART RATE: 69 BPM | WEIGHT: 147 LBS | SYSTOLIC BLOOD PRESSURE: 132 MMHG | DIASTOLIC BLOOD PRESSURE: 80 MMHG | OXYGEN SATURATION: 97 % | BODY MASS INDEX: 23.07 KG/M2 | HEIGHT: 67 IN

## 2023-02-08 DIAGNOSIS — S43.432D LABRAL TEAR OF SHOULDER, LEFT, SUBSEQUENT ENCOUNTER: Primary | ICD-10-CM

## 2023-02-08 RX ORDER — CHLORHEXIDINE GLUCONATE 0.12 MG/ML
15 RINSE ORAL ONCE
OUTPATIENT
Start: 2023-02-08 | End: 2023-02-08

## 2023-02-08 RX ORDER — CHLORHEXIDINE GLUCONATE 4 G/100ML
SOLUTION TOPICAL DAILY PRN
OUTPATIENT
Start: 2023-02-08
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
## 2023-02-20 ENCOUNTER — ANESTHESIA EVENT (OUTPATIENT)
Dept: PERIOP | Facility: HOSPITAL | Age: 33
End: 2023-02-20

## 2023-02-27 NOTE — PRE-PROCEDURE INSTRUCTIONS
Pre-Surgery Instructions:   Medication Instructions   • acetaminophen (TYLENOL) 325 mg tablet Uses PRN- OK to take day of surgery   • carbidopa-levodopa (PARCOPA)  mg per disintegrating tablet Take day of surgery  • Omega-3 Fatty Acids (OMEGA-3 FISH OIL) 300 MG CAPS last dose 2-26-23   Pre op and bathing instructions reviewed  Pt has hibiclens  Pt  Verbalized understanding of current visitor restrictions  Pt  Verbalized an understanding of all instructions reviewed and offers no concerns at this time  Instructed to avoid all ASA/NSAIDs and OTC Vit/Supp from now until after surgery per anesthesia guidelines   Tylenol ok prn  DOS meds with a few sips of H2O

## 2023-03-01 ENCOUNTER — TELEPHONE (OUTPATIENT)
Dept: OBGYN CLINIC | Facility: HOSPITAL | Age: 33
End: 2023-03-01

## 2023-03-01 NOTE — TELEPHONE ENCOUNTER
Caller: patient    Doctor: karina    Reason for call: patient would like a call back to discuss pre surgery soap and wipes      Call back#: [phone]

## 2023-03-03 ENCOUNTER — TELEPHONE (OUTPATIENT)
Dept: OBGYN CLINIC | Facility: HOSPITAL | Age: 33
End: 2023-03-03

## 2023-03-03 ENCOUNTER — ANESTHESIA (OUTPATIENT)
Dept: PERIOP | Facility: HOSPITAL | Age: 33
End: 2023-03-03

## 2023-03-03 ENCOUNTER — HOSPITAL ENCOUNTER (OUTPATIENT)
Facility: HOSPITAL | Age: 33
Setting detail: OUTPATIENT SURGERY
Discharge: HOME | End: 2023-03-03
Attending: ORTHOPAEDIC SURGERY | Admitting: ORTHOPAEDIC SURGERY

## 2023-03-03 VITALS
HEIGHT: 67 IN | SYSTOLIC BLOOD PRESSURE: 144 MMHG | HEART RATE: 80 BPM | DIASTOLIC BLOOD PRESSURE: 84 MMHG | WEIGHT: 143.4 LBS | TEMPERATURE: 98.6 F | RESPIRATION RATE: 20 BRPM | BODY MASS INDEX: 22.51 KG/M2 | OXYGEN SATURATION: 93 %

## 2023-03-03 DIAGNOSIS — S43.432D LABRAL TEAR OF SHOULDER, LEFT, SUBSEQUENT ENCOUNTER: Primary | ICD-10-CM

## 2023-03-03 LAB
ANION GAP SERPL CALCULATED.3IONS-SCNC: 4 MMOL/L (ref 4–13)
BUN SERPL-MCNC: 11 MG/DL (ref 5–25)
CALCIUM SERPL-MCNC: 9.6 MG/DL (ref 8.4–10.2)
CHLORIDE SERPL-SCNC: 102 MMOL/L (ref 96–108)
CO2 SERPL-SCNC: 31 MMOL/L (ref 21–32)
CREAT SERPL-MCNC: 0.79 MG/DL (ref 0.6–1.3)
ERYTHROCYTE [DISTWIDTH] IN BLOOD BY AUTOMATED COUNT: 11.3 % (ref 11.6–15.1)
GFR SERPL CREATININE-BSD FRML MDRD: 117 ML/MIN/1.73SQ M
GLUCOSE P FAST SERPL-MCNC: 93 MG/DL (ref 65–99)
GLUCOSE SERPL-MCNC: 93 MG/DL (ref 65–140)
HCT VFR BLD AUTO: 41.8 % (ref 36.5–49.3)
HGB BLD-MCNC: 15.1 G/DL (ref 12–17)
MCH RBC QN AUTO: 33.3 PG (ref 26.8–34.3)
MCHC RBC AUTO-ENTMCNC: 36.1 G/DL (ref 31.4–37.4)
MCV RBC AUTO: 92 FL (ref 82–98)
PLATELET # BLD AUTO: 261 THOUSANDS/UL (ref 149–390)
PMV BLD AUTO: 9 FL (ref 8.9–12.7)
POTASSIUM SERPL-SCNC: 3.9 MMOL/L (ref 3.5–5.3)
RBC # BLD AUTO: 4.54 MILLION/UL (ref 3.88–5.62)
SODIUM SERPL-SCNC: 137 MMOL/L (ref 135–147)
WBC # BLD AUTO: 4.09 THOUSAND/UL (ref 4.31–10.16)

## 2023-03-03 DEVICE — SELF BUNCHING KL 1.8 FIBERTAK, SHOULDER
Type: IMPLANTABLE DEVICE | Site: SHOULDER | Status: FUNCTIONAL
Brand: ARTHREX®

## 2023-03-03 RX ORDER — SODIUM CHLORIDE, SODIUM LACTATE, POTASSIUM CHLORIDE, CALCIUM CHLORIDE 600; 310; 30; 20 MG/100ML; MG/100ML; MG/100ML; MG/100ML
INJECTION, SOLUTION INTRAVENOUS CONTINUOUS PRN
Status: DISCONTINUED | OUTPATIENT
Start: 2023-03-03 | End: 2023-03-03

## 2023-03-03 RX ORDER — GLYCOPYRROLATE 0.2 MG/ML
INJECTION INTRAMUSCULAR; INTRAVENOUS AS NEEDED
Status: DISCONTINUED | OUTPATIENT
Start: 2023-03-03 | End: 2023-03-03

## 2023-03-03 RX ORDER — HYDROMORPHONE HCL/PF 1 MG/ML
SYRINGE (ML) INJECTION AS NEEDED
Status: DISCONTINUED | OUTPATIENT
Start: 2023-03-03 | End: 2023-03-03

## 2023-03-03 RX ORDER — DEXAMETHASONE SODIUM PHOSPHATE 10 MG/ML
INJECTION, SOLUTION INTRAMUSCULAR; INTRAVENOUS AS NEEDED
Status: DISCONTINUED | OUTPATIENT
Start: 2023-03-03 | End: 2023-03-03

## 2023-03-03 RX ORDER — OXYCODONE HYDROCHLORIDE 5 MG/1
5 TABLET ORAL EVERY 4 HOURS PRN
Qty: 30 TABLET | Refills: 0 | Status: SHIPPED | OUTPATIENT
Start: 2023-03-03

## 2023-03-03 RX ORDER — ROCURONIUM BROMIDE 10 MG/ML
INJECTION, SOLUTION INTRAVENOUS AS NEEDED
Status: DISCONTINUED | OUTPATIENT
Start: 2023-03-03 | End: 2023-03-03

## 2023-03-03 RX ORDER — ONDANSETRON 2 MG/ML
4 INJECTION INTRAMUSCULAR; INTRAVENOUS ONCE AS NEEDED
Status: DISCONTINUED | OUTPATIENT
Start: 2023-03-03 | End: 2023-03-03 | Stop reason: HOSPADM

## 2023-03-03 RX ORDER — CHLORHEXIDINE GLUCONATE 4 G/100ML
SOLUTION TOPICAL DAILY PRN
Status: DISCONTINUED | OUTPATIENT
Start: 2023-03-03 | End: 2023-03-03 | Stop reason: HOSPADM

## 2023-03-03 RX ORDER — METOCLOPRAMIDE HYDROCHLORIDE 5 MG/ML
10 INJECTION INTRAMUSCULAR; INTRAVENOUS ONCE AS NEEDED
Status: DISCONTINUED | OUTPATIENT
Start: 2023-03-03 | End: 2023-03-03 | Stop reason: HOSPADM

## 2023-03-03 RX ORDER — CEFAZOLIN SODIUM 2 G/50ML
2000 SOLUTION INTRAVENOUS ONCE
Status: COMPLETED | OUTPATIENT
Start: 2023-03-03 | End: 2023-03-03

## 2023-03-03 RX ORDER — CHLORHEXIDINE GLUCONATE 0.12 MG/ML
15 RINSE ORAL ONCE
Status: DISCONTINUED | OUTPATIENT
Start: 2023-03-03 | End: 2023-03-03 | Stop reason: HOSPADM

## 2023-03-03 RX ORDER — FENTANYL CITRATE 50 UG/ML
INJECTION, SOLUTION INTRAMUSCULAR; INTRAVENOUS AS NEEDED
Status: DISCONTINUED | OUTPATIENT
Start: 2023-03-03 | End: 2023-03-03

## 2023-03-03 RX ORDER — LIDOCAINE HYDROCHLORIDE 20 MG/ML
INJECTION, SOLUTION EPIDURAL; INFILTRATION; INTRACAUDAL; PERINEURAL AS NEEDED
Status: DISCONTINUED | OUTPATIENT
Start: 2023-03-03 | End: 2023-03-03

## 2023-03-03 RX ORDER — SODIUM CHLORIDE, SODIUM LACTATE, POTASSIUM CHLORIDE, CALCIUM CHLORIDE 600; 310; 30; 20 MG/100ML; MG/100ML; MG/100ML; MG/100ML
50 INJECTION, SOLUTION INTRAVENOUS CONTINUOUS
Status: CANCELLED | OUTPATIENT
Start: 2023-03-03

## 2023-03-03 RX ORDER — HYDROMORPHONE HCL/PF 1 MG/ML
0.5 SYRINGE (ML) INJECTION
Status: DISCONTINUED | OUTPATIENT
Start: 2023-03-03 | End: 2023-03-03 | Stop reason: HOSPADM

## 2023-03-03 RX ORDER — MIDAZOLAM HYDROCHLORIDE 2 MG/2ML
INJECTION, SOLUTION INTRAMUSCULAR; INTRAVENOUS AS NEEDED
Status: DISCONTINUED | OUTPATIENT
Start: 2023-03-03 | End: 2023-03-03

## 2023-03-03 RX ORDER — FENTANYL CITRATE/PF 50 MCG/ML
50 SYRINGE (ML) INJECTION
Status: DISCONTINUED | OUTPATIENT
Start: 2023-03-03 | End: 2023-03-03 | Stop reason: HOSPADM

## 2023-03-03 RX ORDER — NEOSTIGMINE METHYLSULFATE 1 MG/ML
INJECTION INTRAVENOUS AS NEEDED
Status: DISCONTINUED | OUTPATIENT
Start: 2023-03-03 | End: 2023-03-03

## 2023-03-03 RX ORDER — ALBUTEROL SULFATE 2.5 MG/3ML
2.5 SOLUTION RESPIRATORY (INHALATION) ONCE AS NEEDED
Status: DISCONTINUED | OUTPATIENT
Start: 2023-03-03 | End: 2023-03-03 | Stop reason: HOSPADM

## 2023-03-03 RX ORDER — PROPOFOL 10 MG/ML
INJECTION, EMULSION INTRAVENOUS AS NEEDED
Status: DISCONTINUED | OUTPATIENT
Start: 2023-03-03 | End: 2023-03-03

## 2023-03-03 RX ORDER — ONDANSETRON 2 MG/ML
INJECTION INTRAMUSCULAR; INTRAVENOUS AS NEEDED
Status: DISCONTINUED | OUTPATIENT
Start: 2023-03-03 | End: 2023-03-03

## 2023-03-03 RX ORDER — PENICILLIN V POTASSIUM 500 MG/1
500 TABLET ORAL EVERY 6 HOURS SCHEDULED
Qty: 8 TABLET | Refills: 0 | Status: SHIPPED | OUTPATIENT
Start: 2023-03-03 | End: 2023-03-04

## 2023-03-03 RX ADMIN — NEOSTIGMINE METHYLSULFATE 3 MG: 1 INJECTION, SOLUTION INTRAVENOUS at 09:35

## 2023-03-03 RX ADMIN — FENTANYL CITRATE 50 MCG: 50 INJECTION, SOLUTION INTRAMUSCULAR; INTRAVENOUS at 07:36

## 2023-03-03 RX ADMIN — SODIUM CHLORIDE, SODIUM LACTATE, POTASSIUM CHLORIDE, AND CALCIUM CHLORIDE: .6; .31; .03; .02 INJECTION, SOLUTION INTRAVENOUS at 07:25

## 2023-03-03 RX ADMIN — HYDROMORPHONE HYDROCHLORIDE 0.5 MG: 1 INJECTION, SOLUTION INTRAMUSCULAR; INTRAVENOUS; SUBCUTANEOUS at 07:59

## 2023-03-03 RX ADMIN — ROCURONIUM BROMIDE 50 MG: 10 INJECTION, SOLUTION INTRAVENOUS at 07:38

## 2023-03-03 RX ADMIN — GLYCOPYRROLATE 0.6 MG: 0.2 INJECTION INTRAMUSCULAR; INTRAVENOUS at 09:35

## 2023-03-03 RX ADMIN — CEFAZOLIN SODIUM 2000 MG: 2 SOLUTION INTRAVENOUS at 07:25

## 2023-03-03 RX ADMIN — HYDROMORPHONE HYDROCHLORIDE 0.5 MG: 1 INJECTION, SOLUTION INTRAMUSCULAR; INTRAVENOUS; SUBCUTANEOUS at 08:22

## 2023-03-03 RX ADMIN — MIDAZOLAM 2 MG: 1 INJECTION INTRAMUSCULAR; INTRAVENOUS at 07:27

## 2023-03-03 RX ADMIN — PROPOFOL 200 MG: 10 INJECTION, EMULSION INTRAVENOUS at 07:37

## 2023-03-03 RX ADMIN — DEXAMETHASONE SODIUM PHOSPHATE 10 MG: 10 INJECTION, SOLUTION INTRAMUSCULAR; INTRAVENOUS at 07:37

## 2023-03-03 RX ADMIN — LIDOCAINE HYDROCHLORIDE 100 MG: 20 INJECTION, SOLUTION EPIDURAL; INFILTRATION; INTRACAUDAL; PERINEURAL at 07:36

## 2023-03-03 RX ADMIN — ONDANSETRON 4 MG: 2 INJECTION INTRAMUSCULAR; INTRAVENOUS at 09:35

## 2023-03-03 NOTE — TELEPHONE ENCOUNTER
Jo Beckham MRN [id number] : 1990 DR: Alex Press surgery today & can't swallow the medication: Penicillin Pot 500 mg wants Liquid    [address] [phone]  Sent on call Dr Melissa Guerrero

## 2023-03-03 NOTE — ANESTHESIA PROCEDURE NOTES
Peripheral Block    Patient location during procedure: holding area  Start time: 3/3/2023 11:07 AM  Reason for block: at surgeon's request and post-op pain management  Staffing  Performed: Anesthesiologist   Anesthesiologist: Isidro Grande DO  Preanesthetic Checklist  Completed: patient identified, IV checked, site marked, risks and benefits discussed, surgical consent, monitors and equipment checked, pre-op evaluation and timeout performed  Peripheral Block  Patient position: supine  Prep: ChloraPrep  Patient monitoring: continuous pulse ox and frequent blood pressure checks  Procedures: ultrasound guided, Ultrasound guidance required for the procedure to increase accuracy and safety of medication placement and decrease risk of complications    Needle  Needle length: 5 cm  Needle localization: ultrasound guidance  Test dose: negative  Assessment  Injection assessment: incremental injection and local visualized surrounding nerve on ultrasound  Paresthesia pain: none  patient tolerated the procedure well with no immediate complications  Additional Notes  20 CC exparel injected

## 2023-03-03 NOTE — DISCHARGE INSTR - AVS FIRST PAGE
BETTY Santana O  [address] [facility], [address] [place], [place] [address]  Phone: [phone]    Sports Medicine, Shoulder, Elbow, Knee and Arthroscopic Surgery                          SHOULDER ARTHROSCOPY  Labral Repair  POST-OPERATIVE INSTRUCTIONS - PAGE 1    WOUND CARE:   WHAT IS COVERING MY SHOULDER? YOUR SHOULDER IS COVERED IN LAYERS: THE DEEPEST LAYER CONSISTS OF NYLON SUTURE THAT COVER THE PORTALS  ON TOP OF THAT ARE SMALL, BROWN BANDAGES  WHEN DO I REMOVE MY DRESSINGS AND WHAT DO I TAKE OFF? DON'T PULL THE SQUARE BANDAGES UNLESS THEY INADVERTANTLY COME OFF  IN THIS SITUATION, COVER THE ACTUAL PORTALS WITH BAND-AIDS AND CHANGE DAILY UNTIL YOU ARE SEEN POST-OPERATIVELY IN THE OFFICE  IF THE BROWN BANDAGES ARE STILL ON, IT IS NORMAL FOR THEM TO BE BLOOD ENCRUSTED  YOU CAN COVER THEM WITH BAND-AIDS AND CHANGE THE BAND-AIDS DAILY UNTIL YOU ARE SEEN POST-OPERATIVELY IN THE OFFICE  WHEN CAN I SHOWER? YOU MAY SHOWER AFTER THE FIRST POST-OPERATIVE VISIT WHICH IS TYPICALLY 1 WEEK AFTER SURGERY  UNTIL THEN SPONGE BATHE  YOU MUST KEEP YOUR WOUNDS CLEAN AND DRY AT ALL TIMES UNTIL YOUR FIRST FOLLOW UP  THIS IS TO PREVENT INFECTION  ICE  SHOULDER SWELLING IS EXPECTED! WE SUGGEST THAT YOU APPLY ICE TO THE TOP OF THE SHOULDER FOR THE FIRST THREE DAYS 20 MINUTES EVERY FEW HOURS  THE ICE SHOULD BE SEALED IN A PLASTIC BAG AND THE BAG PLACED IN A TOWEL TO KEEP THE DRESSING DRY  MEDICATIONS:  THERE ARE TWO BASIC MEDICATIONS THAT YOU MAY RECEIVE SEPARATE FROM YOUR “USUAL” MEDICATIONS  THESE MAY INCLUDE A PAIN PILL AND AN ANTIBIOTIC  TAKE THEM AS PRESCRIBED  IF YOU HAVE ANY UNUSUAL SYMPTOMS SUCH AS RASHES, ITCHINESS OR DIARRHEA, PLEASE DISCONTINUE THE MEDICATION AND CALL OUR OFFICE OR YOUR MEDICAL DOCTOR  GI DISCOMFORT AND NAUSEA ARE NOT UNUSUAL WITH PAIN MEDICATION AND ANTIBIOTICS, BUT PLEASE CALL IF YOU ARE NOT ABLE TO DRINK OR EAT BY THE NIGHT OF SURGERY  SHOULDER ARTHROSCOPY LABRAL REPAIR  POST-OPERATIVE INSTRUCTIONS - PAGE 2      POST-OPERATIVE EMERGENCIES & CONCERNS:  HEART, LUNG, CALF PROBLEMS                                                                                       IF YOU DEVELOP CHEST PAIN, SHORTNESS OF BREATH OR SIGNIFICANT CALF PAIN, YOU MUST GO TO THE NEAREST EMERGENCY ROOM  BLEEDING OR DRAINAGE  SOME BLEEDING AND DRAINAGE IS EXPECTED  IF THE BANDAGE BECOMES STAINED AND YOU THINK THAT THE DRAINAGE IS [place]  CALL THE OFFICE OR USE [employer] ONLINE TO MESSAGE DR Meir Niño  FEVER  IF YOU HAVE A TEMPERATURE GREATER THAN 101 DEGREES ON MORE THAN ONE READING 48 HOURS OR MORE AFTER SURGERY…… CALL THE OFFICE OR USE [employer] ONLINE TO MESSAGE DR Meir Niño  SWELLING  SWELLING IS USUAL FOR THE FIRST THREE DAYS UNTIL THE FLUID IS DISSIPATED INTO THE DRESSINGS  IF YOU HAVE NUMBNESS, COLDNESS AND TINGLING IN THE UPPER EXTREMITY…  Minda Luis CALL THE OFFICE OR USE [employer] ONLINE TO MESSAGE DR Meir Niño  UNRELENTING PAIN  IF SEVERE PAIN REMAINS 48 HOURS AFTER SURGERY…  CALL THE OFFICE OR USE [employer] ONLINE TO MESSAGE DR Meir Niño  ANSWERING SERVICES, CELL PHONES AND BEEPERS ARE NOT PERFECT  COMMON SENSE DICTATES THAT IF YOU CAN'T GET A HOLD OF YOUR ORTHOPEDIC SURGEON OR YOUR MEDICAL DOCTOR, YOU SHOULD GO TO THE EMERGENCY ROOM TO PLAY IT SAFE !!    GOING TO THE BATHROOM:  TYPICALLY, PATIENTS WILL HAVE URINATED PRIOR TO LEAVING [facility] [address]  IF YOU FIND IT DIFFICULT TO URINATE WHEN YOU ARE AT HOME BY THE EVENING HOURS, PLEASE CALL US OR SIMPLY GO TO THE EMERGENCY ROOM  THIS IS TRUE FOR PATIENTS WITH OR WITHOUT A HISTORY OF PROSTATE DISEASE OR BLADDER PROBLEMS  SHOULDER ARTHROSCOPY LABRAL REPAIR  POST-OPERATIVE INSTRUCTIONS - PAGE 3    SHOULDER SLING/IMMOBILIZER? Your arm has been placed in a sling for your comfort and protection  You may adjust it  as necessary to ensure comfort     During the first 24 hours, you should wear the sling at all times until your nerve block  completely wears off  You may remove the sling for bathing or dressing with care not to actively lift your arm  away from the side or extend your arm to the extremes of motion  OTHERWISE YOUR SLING SHOULD BE ON AT ALL TIMES! Depending on the complexity of the procedure performed, you may wear the sling for  4-6 weeks  This will be discussed in further detail at your first post-operative visit  It is important to wear the sling at night to prevent undue injury as a result of restless  sleep  Activity Restriction   During labral repair, your torn labrum is reconnected to the insertion on the bone using sutures and anchors  During the first several weeks following surgery, the strength of this repair is only as strong as the strength of the sutures, and it is critical that no stress is applied to the repair   The following restrictions apply in the immediate postoperative period  o No active lifting of your arm away from your side (ie - do not lift your arm up or away from your body on your own)  o No carrying anything more than a cup of coffee or daily newspaper  o No pushing or pulling such as opening or closing a door  o Keep your arm in the sling at all times except dressing or bathing   You may bend and straighten your elbow and fingers as much as you want   When you begin physical therapy, your activity and range of motion restrictions will be reviewed in detail  WHEN DO I BEGIN SUPERVISED PHYSICAL THERAPY? THIS WILL BE DISCUSSED AT YOUR FIRST POST OPERATIVE VISIT IN 1 WEEK  WHEN CAN I DRIVE? YOU CANNOT DRIVE UNTIL GIVEN PERMISSION POST-OPERATIVELY BY YOUR SURGEON  EVEN WITH PERMISSION, DO NOT DRIVE UNDER THE INFLUENCE OF YOUR POST-OPERATIVE PAIN MEDICATION!

## 2023-03-03 NOTE — INTERVAL H&P NOTE
H&P reviewed  After examining the patient I find no changes in the patients condition since the H&P had been written      Vitals:    03/03/23 0713   BP: 117/58   Pulse: 75   Resp: 20   Temp: (!) 97 °F (36 1 °C)   SpO2: 100%     Plan for left shoulder arthroscopy, labral repair today

## 2023-03-03 NOTE — ANESTHESIA PREPROCEDURE EVALUATION
Procedure:  ARTHROSCOPY SHOULDER LABRAL REPAIR (Left: Shoulder)    Relevant Problems   CARDIO   (+) Essential hypertension   (+) Intractable chronic migraine without aura      NEURO/PSYCH   (+) Chronic left shoulder pain   (+) Chronic right shoulder pain   (+) Intractable chronic migraine without aura             Anesthesia Plan  ASA Score- 2     Anesthesia Type- general with ASA Monitors  Additional Monitors:   Airway Plan: ETT  Comment: Interscalene block  Plan Factors-    Chart reviewed  Induction- intravenous  Postoperative Plan- Plan for postoperative opioid use  Planned trial extubation    Informed Consent- Anesthetic plan and risks discussed with patient  I personally reviewed this patient with the CRNA  Discussed and agreed on the Anesthesia Plan with the CRNA  Macy Deras

## 2023-03-03 NOTE — OP NOTE
OPERATIVE REPORT  PATIENT NAME: David Solorzano    :  1990  MRN: [id number]  Pt Location: [facility] OR ROOM 01    SURGERY DATE: 3/3/2023    Surgeon(s) and Role:     * Karson Graham - Primary     * Stepan Griffin PA-C - Assisting     * Quoc Husain MD - Assisting    Preop Diagnosis:  Labral tear of shoulder, left, subsequent encounter [S43 432D]    Post-Op Diagnosis Codes:     * Labral tear of shoulder, left, subsequent encounter [S43 432D]    Procedure(s):  Left - ARTHROSCOPY SHOULDER LABRAL REPAIR    Specimen(s):  * No specimens in log *    Estimated Blood Loss:   Minimal    Drains:  * No LDAs found *    Anesthesia Type:   General w/ Interscalene Block    Operative Indications:  Labral tear of shoulder, left, subsequent encounter [S43 432D]      Operative Findings:  Degenerative SLAP tear  Small anterior-inferior labral tear  Posterior labral tear from 7-10 o'clock  Complications:   None    Procedure and Technique:  Patient was seen in preoperative holding area with operative extremities marked  He was taken to the operating room and placed in the lateral decubitus position  The left upper extremity was prepped and draped in usual sterile fashion  A timeout was called and patient was ministered Ancef 2 g IV prior to incision  Using an 11 blade knife a posterior portal was established  Arthroscope was placed in the glenohumeral joint  The glenohumeral cartilage was grossly normal   The biceps anchor demonstrated significant degenerative tearing  There was a degenerative SLAP tear that extended from 2:00 to 11 o'clock position  An anterior portal was established and the posterior labrum demonstrated a posterior labral tear from the 7 to 10 o'clock position  Using a combination of a soft tissue elevator, ball tip rasp, and arthroscopic shaver to the glenoid rim was debrided down to bleeding bone  The posterior labrum was fixed using 3 knotless fiber tack anchors    Anterior inferiorly there was a a small tear that required only 1 anchor with the fiber tack knotless anchor  The SLAP tear was repaired anteriorly with 1 knotless fiber tack near the 1 o'clock position making sure not to incarcerate the long head of biceps tendon  Once the posterior anterior labrum's were repaired further evaluation demonstrated good reduction of the labrum to the bone as well as good fixation  The long head biceps tendon was grossly normal   Using arthroscopic shaver the degenerative tearing of the bicep anchor was debrided down to a stable layer  Further anchor placement was not required  The excess fluid was evacuated from the shoulder  The portals were closed with 3-0 nylon  Dressings include Plex dressing  The patient was placed in a shoulder immobilizer  There were no complications of the case  Patient tolerated procedure well     I was present for the entire procedure and A physician assistant was required during the procedure for retraction tissue handling,dissection and suturing    Patient Disposition:  PACU         SIGNATURE: Karson Graham  DATE: March 3, 2023  TIME: 9:44 AM

## 2023-03-03 NOTE — ANESTHESIA POSTPROCEDURE EVALUATION
Post-Op Assessment Note    CV Status:  Stable  Pain Score: 0    Pain management: adequate     Mental Status:  Awake   Hydration Status:  Stable   PONV Controlled:  Controlled   Airway Patency:  Patent      Post Op Vitals Reviewed: Yes      Staff: CRNA         There were no known notable events for this encounter      BP   132/65   Temp  97 7   Pulse  69   Resp   12   SpO2   96

## 2023-03-04 DIAGNOSIS — S43.432D LABRAL TEAR OF SHOULDER, LEFT, SUBSEQUENT ENCOUNTER: Primary | ICD-10-CM

## 2023-03-09 ENCOUNTER — OFFICE VISIT (OUTPATIENT)
Dept: OBGYN CLINIC | Facility: CLINIC | Age: 33
End: 2023-03-09

## 2023-03-09 DIAGNOSIS — Z98.890 STATUS POST LABRAL REPAIR OF SHOULDER: Primary | ICD-10-CM
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
## 2023-03-24 ENCOUNTER — EVALUATION (OUTPATIENT)
Dept: PHYSICAL THERAPY | Facility: REHABILITATION | Age: 33
End: 2023-03-24

## 2023-03-24 DIAGNOSIS — Z98.890 STATUS POST LABRAL REPAIR OF SHOULDER: Primary | ICD-10-CM

## 2023-03-24 NOTE — PROGRESS NOTES
PT Evaluation     Today's date: 3/24/2023  Patient name: Neftaly Bryan  : 1990  MRN: [id number]  Referring provider: Tesha Nunez DO  Dx:   Encounter Diagnosis     ICD-10-CM    1  Status post labral repair of shoulder  [id number]     Left                      Assessment  Assessment details: Pt is a pleasant 28 y o  male presenting to outpatient physical therapy with Status post labral repair of shoulder  (primary encounter diagnosis)   Pt arrived to therapy on this date without his sling and actively moving his LUE  Pt was immediately educated on the post-op protocol which states he is to be wearing his sling and not allowed AROM due to tissue healing times and protecting the repair that was completed  Pt verbalized understanding but stated that the sling was uncomfortable and that with his parkinson's, it is hard for him not to not move his arm  Pt was again educated on importance of protecting repair  Pt presents with pain, decreased range of motion, decreased strength, and decreased tolerance to activity  Pt educated in post-operative protocol, self-hygiene, safety precautions, and given HEP  Pt would benefit from skilled physical therapy to address limitations and to achieve goals  Thank you for this referral    Impairments: abnormal coordination, abnormal or restricted ROM, activity intolerance, impaired physical strength and pain with function  Understanding of Dx/Px/POC: good   Prognosis: good    Goals  Short-term goals  1  Pt will decrease pain by 1-2 points within 4 weeks  2  Pt will improve ROM to benchmarks as outlined in surgeon's protocol within 4-6 weeks  3  Pt will be independent in donning/doffing sling within 4 weeks  Long-term goals  1  Pt will be independent in HEP by discharge  2  Pt will be able to reach to shoulder-height shelf with minimal discomfort or scapular compensation in 8 weeks  3  Pt will be able to perform IADLS without pain or compensation in 10 weeks    4  Pt will be able to carry household items/grocery bags with <3/10 pain and without compensation in 12 weeks  Plan  Plan details: Pt will be continuing care at the [facility] office which is more convenient for him location wise  Patient would benefit from: PT eval and skilled PT  Planned modality interventions: cryotherapy  Planned therapy interventions: IADL retraining, body mechanics training, flexibility, functional ROM exercises, home exercise program, neuromuscular re-education, manual therapy, postural training, strengthening, stretching, therapeutic activities, therapeutic exercise, activity modification, patient education and self care  Frequency: 2x week  Duration in visits: 20  Duration in weeks: 12  Treatment plan discussed with: patient and caregiver        Subjective Evaluation    History of Present Illness  Date of surgery: 3/3/2023  Mechanism of injury: Pt reports that since surgery his shoulder has been feeling okay  He reports that he has not been compliant with his sling  He reports that his parkinson's bothers him a lot and he has been moving his arm where he shouldn't  He reports that his shoulder has been bothering him when moves his arm up     Pain  Current pain ratin  At worst pain ratin  Relieving factors: ice  Aggravating factors: overhead activity and lifting    Hand dominance: right    Patient Goals  Patient goals for therapy: decreased pain, increased motion, increased strength, independence with ADLs/IADLs and return to sport/leisure activities  Patient goal: get my arm to heal         Objective     Observations     Additional Observation Details  L shoulder surgical site clean, dry, and healing well, not s/s of infection     Active Range of Motion     Right Shoulder   Normal active range of motion    Additional Active Range of Motion Details  AROM not allowed per protocol     Passive Range of Motion   Left Shoulder   Flexion: 90 degrees   Abduction: 90 degrees   Internal rotation 45°: 45 degrees     Additional Passive Range of Motion Details  Pt asked therapist to move his arm further than allowed per protocol to stretch it, evaluating PT stated that she was unable to do so per allowance of protocol and the need to protect the healing tissues/structures     Strength/Myotome Testing     Right Shoulder     Planes of Motion   Flexion: 4+   Abduction: 4+   External rotation at 0°: 4+   Internal rotation at 0°: 4+     Additional Strength Details  Pt reported pain with all MMT on RUE on this date   LUE not tested     General Comments:      Shoulder Comments   Pt arrived to therapy with no sling and was observed actively moving his LUE above motion limitations set in protocol, pt is currently only able to be completing PROM             Precautions: HTN, migraines, Parkinson's syndrome, ambulatory dysfunction   Protocol under Media tab and attached to chart      3/24            FOTO             IE/RE IE               Manuals             L shoulder PROM per protocol                                                     Exercises             Pendulums             Elbow ROM             Wrist ROM             Gripper              Scap retractions                                                                                                                                                                          Modalities             CP PRN

## 2023-03-27 NOTE — PROGRESS NOTES
PT Evaluation     Today's date: 3/29/2023  Patient name: Ranjeet Bennett  : 1990  MRN: [id number]  Referring provider: Juanis Reis DO  Dx:   Encounter Diagnosis     ICD-10-CM    1  Acute pain of left shoulder  M25 512       2  Status post labral repair of shoulder  Z98 890 Ambulatory referral to Physical Therapy          Start Time: 1216  Stop Time: 1625  Total time in clinic (min): 40 minutes    Assessment  Assessment details: Ranjeet Bennett is a 28 y o  male who presents to PT evaluation for status post left shoulder arthroscopy anterior and posterior labral repair performed on 3/3/2023  He demonstrates the following impairments: L shoulder pain, decreased ROM, weakness, activity tolerance, muscle performance, functional mobility  The listed impairments limit the individuals ability to perform the following: ADLs, household chores, self hygiene, boxing  HEP was provided and reviewed  Patient is able to complete HEP with good technique and appropriate pain response  Patient expressed understanding of appropriate dosage and frequency of HEP  No additional referral necessary  Pt would benefit from skilled PT to improve upon impairments to improve QoL  Impairments: abnormal or restricted ROM, activity intolerance, impaired physical strength, lacks appropriate home exercise program and pain with function  Barriers to therapy: Parkinson's influencing ability to follow immobilization phase within protocol   Understanding of Dx/Px/POC: excellent  Goals  Short Term Goals: In 2-4 weeks, the patient will:  1  Pt will demonstrate full PROM L shoulder elevation  2  Pt will demonstrate full PROM L shoulder ER/IR  3  Supervision with HEP for self care    Long Term Goals: At time of D/C, the patient will:  1  Pt will be able to return to boxing  2  FOTO to greater than predicted value  3   Independent with HEP for selfcare      Plan  Patient would benefit from: skilled physical therapy  Planned modality interventions: biofeedback, cryotherapy, electrical stimulation/Beninese stimulation, manual electrical stimulation, microcurrent electrical stimulation, TENS and thermotherapy: hydrocollator packs  Planned therapy interventions: abdominal trunk stabilization, ADL retraining, activity modification, balance/weight bearing training, breathing training, body mechanics training, flexibility, functional ROM exercises, graded activity, graded exercise, home exercise program, joint mobilization, manual therapy, neuromuscular re-education, postural training, patient education, strengthening, stretching, therapeutic exercise, therapeutic training and therapeutic activities  Frequency: 2x week  Duration in visits: 12  Duration in weeks: 6  Treatment plan discussed with: patient        Subjective Evaluation    History of Present Illness  Mechanism of injury: Pt reports to PT evaluation status post left shoulder arthroscopy anterior and posterior labral repair performed on 3/3/2023  Pt does not present wearing sling to today's session due to his movement from Parkinson's  He notes that he has a lot of shoulder pain which limits his ability to lift it up in the air  His pain also limits his ability to get dressed, eat, bed mobility, perform yoga, and running  He reports that he has trouble keeping his arm still due to his Parkinson's disease where his shoulder gets painful because of it, and he is worried that he might retear his labrum and may need another surgery     Pain  Current pain ratin  At best pain ratin  At worst pain ratin  Location: L shoulder  Aggravating factors: keyboarding, overhead activity and lifting  Progression: worsening    Treatments  Previous treatment: physical therapy  Current treatment: medication and physical therapy  Patient Goals  Patient goals for therapy: decreased pain, increased motion, independence with ADLs/IADLs, return to sport/leisure activities, return to work and increased strength  Patient "goal: to get his shoulder feeling as normal as possible        Objective  1:1 with Vaishnavi Pop DPT for entirety of tx  OBJECTIVE:      Standing         Head Position  Protracted x Neutral  Retracted   Scapular Position  Protracted x Neutral  Retracted   Thoracic Spine  Inc Kyphosis x Neutral     Lumbar Spine  Inc Lordosis x Neutral  Dec Lordosis   Pelvis  Anterior Tilt x Neutral  Posterior Tilt   Iliac Crest  L elevated x Neutral  R elevated   Scoliotic Curvature  \"C\" Curve  \"S\" Curve     Lateral Shift  Right  Left x None     Strength and ROM evaluated B from a regional biomechanical perspective and values relevant to this episode recorded in table below    AROM: Goniometric measurement revealed the following findings  Shoulder ROM Right: Left:    Flexion 180 Not assessed as per protocol   Abduction 180 Not assessed as per protocol   TONYA T2 Not assessed as per protocol   FIR T8 Not assessed as per protocol     PROM: Goniometric measurement revealed the following findings  Shoulder ROM Right: Left:    Flexion 180 120   Abduction 180 120    45   IR 50 30     Strength: MMT revealed the following findings    Joint Motion Right:  Left:    Sh  Flexion 5/5 Not assessed as per protocol   Sh  Abduction 5/5 Not assessed as per protocol   Sh  ER 5/5* Not assessed as per protocol   Sh  IR 5/5 Not assessed as per protocol       Additional Assessments:  Joint mobility: empty end feel        Precautions: HTN, PD, history of HPV    Pertinent Findings:                                                                                                                                                     Test / Measure  03/27/23   FOTO (pred 68) 29   L [place] PROM Flex - 120  ABD - 120  IR - 45  ER - 30   L Shoulder AROM Not assessed as per protocol   L Shoulder MMT Not assessed as per protocol       Manuals 3/29            PROM                                                    Neuro Re-Ed 3/29            Pendulums HEP        " Scap Squeezes HEP            Shoulder Shrugs HEP            Elbow Flex/Ext AROM HEP             Shoulder ISO @ Wall: ER, ABD HEP                                      Ther Ex 3/29            Pulleys             [place]: Rows, Ext                                                                                           Ther Activity 3/29                                      Gait Training 3/29                                      Modalities 3/29

## 2023-03-29 ENCOUNTER — OFFICE VISIT (OUTPATIENT)
Dept: PHYSICAL THERAPY | Facility: CLINIC | Age: 33
End: 2023-03-29

## 2023-03-29 DIAGNOSIS — Z98.890 STATUS POST LABRAL REPAIR OF SHOULDER: ICD-10-CM

## 2023-03-29 DIAGNOSIS — M25.512 ACUTE PAIN OF LEFT SHOULDER: Primary | ICD-10-CM

## 2023-04-04 ENCOUNTER — OFFICE VISIT (OUTPATIENT)
Dept: PHYSICAL THERAPY | Facility: CLINIC | Age: 33
End: 2023-04-04

## 2023-04-04 DIAGNOSIS — Z98.890 STATUS POST LABRAL REPAIR OF SHOULDER: Primary | ICD-10-CM

## 2023-04-04 DIAGNOSIS — M25.512 ACUTE PAIN OF LEFT SHOULDER: ICD-10-CM

## 2023-04-04 NOTE — PROGRESS NOTES
Daily Note     Today's date: 2023  Patient name: Shelly Elizalde  : 1990  MRN: [id number]  Referring provider: Soledad Glynn DO  Dx:   Encounter Diagnosis     ICD-10-CM    1  Status post labral repair of shoulder  Z98 890       2  Acute pain of left shoulder  M25 512           Start Time: 1109  Stop Time: 1129  Total time in clinic (min): 20 minutes    Subjective: pt reports that his shoulder has been feeling a little bit better compared to his initial eval  He notes that all of the exercises are painful but the pendulums  Objective: See treatment diary below  Pt is not wearing sling during today's session  Assessment: Tolerated treatment well  Patient demonstrated fatigue post treatment, exhibited good technique with therapeutic exercises and would benefit from continued PT   Pt 9 minutes late for treatment and accounted for  Pt tolerated today's session well as per appropriate response to intervention  Pt not wearing sling; educated pt on on protocol, healing timeline/prognosis s/p labral tear, AND ENCOURAGED PT TO WEAR SLING  He demonstrated improved PROM to L shoulder elevation WFL, but demonstrates most irritability with shoulder IR PROM  He would benefit from continued, skilled PT to improve impairments to improve QoL  1:1 with Keisha Larsen DPT for entirety of tx  Plan: Continue per plan of care        Precautions: HTN, PD, history of HPV    Pertinent Findings:                                                                                                                                                     Test / Measure  23   FOTO (pred 68) 29   L [place] PROM Flex - 120  ABD - 120  IR - 45  ER - 30   L Shoulder AROM Not assessed as per protocol   L Shoulder MMT Not assessed as per protocol       Manuals 3/29 4/4           PROM  BV                                                  Neuro Re-Ed 3/29 4/4           Pendulums HEP 2 min           Scap Squeezes HEP Shoulder Shrugs HEP            Elbow Flex/Ext AROM HEP             Shoulder ISO @ Wall: ER, ABD HEP                         Pt education  BV           Ther Ex 3/29 4/4           Pulleys             [place]: Rows, Ext                                                                                           Ther Activity 3/29 4/4                                     Gait Training 3/29 4/4                                     Modalities 3/29 4/4

## 2023-04-06 ENCOUNTER — OFFICE VISIT (OUTPATIENT)
Dept: PHYSICAL THERAPY | Facility: CLINIC | Age: 33
End: 2023-04-06

## 2023-04-06 DIAGNOSIS — Z98.890 STATUS POST LABRAL REPAIR OF SHOULDER: Primary | ICD-10-CM

## 2023-04-06 DIAGNOSIS — M25.512 ACUTE PAIN OF LEFT SHOULDER: ICD-10-CM

## 2023-04-06 NOTE — PROGRESS NOTES
Daily Note     Today's date: 2023  Patient name: Ari Gilmore  : 1990  MRN: [id number]  Referring provider: Mami Garcia DO  Dx:   Encounter Diagnosis     ICD-10-CM    1  Status post labral repair of shoulder  Z98 890       2  Acute pain of left shoulder  M25 512           Start Time: 1002  Stop Time: 1045  Total time in clinic (min): 43 minutes    Subjective: pt reports that he has a good amount of L shoulder pain from getting dressed earlier today  Pt reports that he hasn't been doing his HEP but that he did yoga with his affected arm yesterday  Objective: See treatment diary below  Pt donning brace entering today's session very loose    Assessment: Tolerated treatment well  Patient demonstrated fatigue post treatment, exhibited good technique with therapeutic exercises and would benefit from continued PT   Pt tolerated today's session well as per appropriate response to intervention  Performed HEP during today's session, introduced visual and tactile biofeedback for HEP exercises and he demonstrated improved response  Educated pt on physician protocol and modifying activity within protocol as well as adjusted brace  He continues to demonstrate L shoulder pain, decreased ROM, and weakness; and would benefit from continued, skilled PT to improve impairments to improve QoL  1:1 with Reynaldo Sanchez DPT for 41 minutes of tx  Plan: Continue per plan of care        Precautions: HTN, PD, history of HPV    Pertinent Findings:                                                                                                                                                     Test / Measure  23   FOTO (pred 68) 29   L [place] PROM Flex - 120  ABD - 120  IR - 45  ER - 30   L Shoulder AROM Not assessed as per protocol   L Shoulder MMT Not assessed as per protocol       Manuals 3/29 4/4 4/6          PROM  BV BV                                                 Neuro Re-Ed 3/29 4/4 4/6          Pendulums "HEP 2 min 2 min          Scap Squeezes HEP  5\"x10 c 1/4 foam @ wall           Shoulder Shrugs HEP  5\"x10 c ball @ mirror          Elbow Flex/Ext AROM HEP   2x15 c 1/4 foam @ wall           Shoulder ISO @ Wall: ER, ABD HEP                         Pt education  BV BV          Ther Ex 3/29 4/4 4/6          Pulleys             [place]: Rows, Ext                                                                                           Ther Activity 3/29 4/4                                     Gait Training 3/29 4/4                                     Modalities 3/29 4/4                                          "

## 2023-04-11 ENCOUNTER — APPOINTMENT (OUTPATIENT)
Dept: PHYSICAL THERAPY | Facility: CLINIC | Age: 33
End: 2023-04-11

## 2023-04-13 PROBLEM — Z98.890 STATUS POST LABRAL REPAIR OF SHOULDER: Status: ACTIVE | Noted: 2023-04-13

## 2023-04-25 ENCOUNTER — OFFICE VISIT (OUTPATIENT)
Dept: PHYSICAL THERAPY | Facility: CLINIC | Age: 33
End: 2023-04-25

## 2023-04-25 DIAGNOSIS — Z98.890 STATUS POST LABRAL REPAIR OF SHOULDER: Primary | ICD-10-CM

## 2023-04-25 DIAGNOSIS — M25.512 ACUTE PAIN OF LEFT SHOULDER: ICD-10-CM

## 2023-04-25 NOTE — PROGRESS NOTES
Daily Note     Today's date: 2023  Patient name: Ashley Brady  : 1990  MRN: [id number]  Referring provider: Malika Reyes DO  Dx:   Encounter Diagnosis     ICD-10-CM    1  Status post labral repair of shoulder  Z98 890       2  Acute pain of left shoulder  M25 512           Start Time: 1030  Stop Time: 1057  Total time in clinic (min): 27 minutes    Subjective: pt reports that his shoulder is starting to feel better and that he is able to move it better  Objective: See treatment diary below  - Pt presents to session wearing sling      Assessment: Tolerated treatment well  Patient demonstrated fatigue post treatment, exhibited good technique with therapeutic exercises and would benefit from continued PT  7 4 days s/p  Pt tolerated today's session well as per appropriate response to intervention  Adjusted sling for pt pre and post session and educated on proper positioning within sling  He demonstrated improved shoulder irritability as well as PROM shoulder elevation close to end range  Will progress interventions within phase IV of protocol next session  He continues to demonstrate limited shoulder ROM due to pain and weakness; and he would benefit from continued, skilled PT to improve impairments to improve QoL  1:1 with Vaishnavi Pop DPT for entirety of tx  Plan: Continue per plan of care        Precautions: HTN, PD, history of HPV    Pertinent Findings:                                                                                                                                                     Test / Measure  23   FOTO (pred 68) 29   L [place] PROM Flex - 120  ABD - 120  IR - 45  ER - 30   L Shoulder AROM Not assessed as per protocol   L Shoulder MMT Not assessed as per protocol       Manuals 3/29 4/4 4/      PROM  BV BV BV BV HY BV                                             Neuro Re-Ed 3/29 4/4 4/6 4/13 4/18 4/20 4/25      Pendulums HEP 2 min 2 min  2 min "2'       Scap Squeezes HEP  5\"x10 c 1/4 foam @ wall           Shoulder Shrugs HEP  5\"x10 c ball @ mirror          Elbow Flex/Ext AROM HEP   2x15 c 1/4 foam @ wall      NV 5#     Shoulder ISO @ Wall: ER, ABD HEP    10\"x15 ER, IR, ABD 10\"x15 ER, IR, ABD       Towel Slides at Wall    x15 ea flex/  ABD/  scap  x15 ea flex/  ABD/  scap  NV    Ecc   Flex/ABD             NV     Push-Up Plus        [place]  NV                                                         FOTO       BV      Pt education  BV  BV BV  BV      Ther Ex 3/29 4/4 4/6 4/13 4/18 4/20 4/25      Pulleys    5'         [place]: Rows, Ext        NV     Flat[place] Circles        NV     Standing ITYs        [place]                                                         Ther Activity 3/29 4/4 4/13 4/18 4/20 4/25                                Gait Training 3/29 4/4 4/13 4/18 4/20 4/25                                Modalities 3/29 4/4 4/13 4/18 4/20 4/25                                     "

## 2023-04-27 ENCOUNTER — OFFICE VISIT (OUTPATIENT)
Dept: PHYSICAL THERAPY | Facility: CLINIC | Age: 33
End: 2023-04-27

## 2023-04-27 DIAGNOSIS — M25.512 ACUTE PAIN OF LEFT SHOULDER: ICD-10-CM

## 2023-04-27 DIAGNOSIS — Z98.890 STATUS POST LABRAL REPAIR OF SHOULDER: Primary | ICD-10-CM

## 2023-04-27 NOTE — PROGRESS NOTES
"Daily Note     Today's date: 2023  Patient name: Marla Arellnao  : 1990  MRN: [id number]  Referring provider: Brown Lee DO  Dx:   Encounter Diagnosis     ICD-10-CM    1  Status post labral repair of shoulder  Z98 890       2  Acute pain of left shoulder  M25 512           Start Time: 1038  Stop Time: 1100  Total time in clinic (min): 22 minutes    Subjective: pt reports that his shoulder is feeling good, but that he had some night pain last night  Objective: See treatment diary below      Assessment: Tolerated treatment well  Patient demonstrated fatigue post treatment, exhibited good technique with therapeutic exercises and would benefit from continued PT   Pt 8 minutes late and accounted for  Pt demonstrated improved tolerance to PROM but demonstrated hyperkinetic movement and poor activity tolerance due to timing of Sediment medication consumption  Introduced gravity eliminated AROM elevation and he demonstrated poor ROM and tolerance due to pain and weakness, instructed to add to HEP  He would benefit from continued, skilled PT to improve impairments to improve QoL  1:1 with Yuridia Silvestre DPT for entirety of tx  Plan: Continue per plan of care        Precautions: HTN, PD, history of HPV    Pertinent Findings:                                                                                                                                                     Test / Measure  23   FOTO (pred 68) 29   L [place]oud[place] PROM Flex - 120  ABD - 120  IR - 45  ER - 30   L Shoulder AROM Not assessed as per protocol   L Shoulder MMT Not assessed as per protocol       Manuals 3/29 4/4 4/6 4/13 4/18 4/20 4/25 4/27     PROM  BV BV BV BV HY BV BV                                            Neuro Re-Ed 3/29 4/4 4/     Pendulums HEP 2 min 2 min  2 min 2'       Scap Squeezes HEP  5\"x10 c  foam @ wall           Shoulder Shrugs HEP  5\"x10 c ball @ mirror          Elbow Flex/Ext AROM " "HEP   2x15 c 1/4 foam @ wall           Shoulder ISO @ Wall: ER, ABD HEP    10\"x15 ER, IR, ABD 10\"x15 ER, IR, ABD       Towel Slides at [facility]    x15 ea flex/  ABD/  scap  x15 ea flex/  ABD/  scap                    Push-Up Plus                                                                 FOTO       BV      Pt education  BV  BV BV  BV      Ther Ex 3/29 4/4 4/6 4/13 4/18 4/20 4/25 4/27     Pulleys    5'         [place]: Rows, Ext             Ball Wall Circles             Standing ITYs             Gravity Eliminated Shoulder Elevation         Flex/  ABD  x10 ea                                            Ther Activity 3/29 4/4 4/13 4/18 4/20 4/25 4/27                               Gait Training 3/29 4/4 4/13 4/18 4/20 4/25 4/27                               Modalities 3/29 4/4 4/13 4/18 4/20 4/25 4/27                                    "

## 2023-05-01 ENCOUNTER — OFFICE VISIT (OUTPATIENT)
Dept: PHYSICAL THERAPY | Facility: CLINIC | Age: 33
End: 2023-05-01

## 2023-05-01 DIAGNOSIS — Z98.890 STATUS POST LABRAL REPAIR OF SHOULDER: Primary | ICD-10-CM

## 2023-05-01 DIAGNOSIS — M25.512 ACUTE PAIN OF LEFT SHOULDER: ICD-10-CM

## 2023-05-01 NOTE — PROGRESS NOTES
"Daily Note     Today's date: 2023  Patient name: Khushi Washington  : 1990  MRN: [id number]  Referring provider: Analia Vizcaino DO  Dx:   Encounter Diagnosis     ICD-10-CM    1  Status post labral repair of shoulder  Z98 890       2  Acute pain of left shoulder  M25 512           Start Time: 1106  Stop Time: 1131  Total time in clinic (min): 25 minutes    Subjective: pt reports that he has had a lot of stiffness in his L shoulder due to his PD  Objective: See treatment diary below      Assessment: Tolerated treatment well  Patient demonstrated fatigue post treatment, exhibited good technique with therapeutic exercises and would benefit from continued PT   Pt tolerated today's session well as per appropriate response to intervention  He demonstrated improved tolerance to PROM due to timing of breakfast with his medication, and demonstrated almost full PROM shoulder elevation  Added periscapular strengthening and pt demonstrated significant winging and periscapular weakness; added to HEP  He continues to demonstrate L shoulder weakness and pain s/p; and he would benefit from continued, skilled PT to improve impairments to improve QoL  1:1 with Mary Duran DPT for entirety of tx  Plan: Continue per plan of care        Precautions: HTN, PD, history of HPV    Pertinent Findings:                                                                                                                                                     Test / Measure  23   FOTO (pred 68) 29   L [place]oud[place] PROM Flex - 120  ABD - 120  IR - 45  ER - 30   L Shoulder AROM Not assessed as per protocol   L Shoulder MMT Not assessed as per protocol       Manuals 3/29 4/4 4/6  5/    PROM  BV BV BV BV HY BV BV BV                                           Neuro Re-Ed 3/29 4/4 4/6  5/1    Pendulums HEP 2 min 2 min  2 min 2'       Scap Squeezes HEP  5\"x10 c  foam @ wall         " "  Shoulder Shrugs HEP  5\"x10 c ball @ mirror          Elbow Flex/Ext AROM HEP   2x15 c 1/4 foam @ wall           Shoulder ISO @ Wall: ER, ABD HEP    10\"x15 ER, IR, ABD 10\"x15 ER, IR, ABD       Towel Slides at Wall    x15 ea flex/  ABD/  scap  x15 ea flex/  ABD/  scap                    Push-Up Plus                                                                 FOTO       BV      Pt education  BV  BV BV  BV      Ther Ex 3/29 4/4 4/6 4/13 4/18 4/20 4/25 4/27 5/1    Pulleys    5'         [place]: Rows, Ext         8#/ 2x15 ea    Ball Wall Circles             Standing ITYs             Gravity Eliminated Shoulder Elevation         Flex/  ABD  x10 ea Flex  x15    ABD 2x15                                           Ther Activity 3/29 4/4 4/13 4/18 4/20 4/25 4/27 5/1                              Gait Training 3/29 4/4 4/13 4/18 4/20 4/25 4/27 5/1                              Modalities 3/29 4/4 4/13 4/18 4/20 4/25 4/27                                    "

## 2023-05-02 ENCOUNTER — OFFICE VISIT (OUTPATIENT)
Dept: DENTISTRY | Facility: CLINIC | Age: 33
End: 2023-05-02

## 2023-05-02 VITALS — TEMPERATURE: 97.5 F

## 2023-05-02 DIAGNOSIS — Z01.20 ENCOUNTER FOR DENTAL EXAMINATION: Primary | ICD-10-CM

## 2023-05-02 DIAGNOSIS — Z59.41 FOOD INSECURITY: ICD-10-CM

## 2023-05-02 DIAGNOSIS — K01.1 IMPACTED THIRD MOLAR TOOTH: ICD-10-CM

## 2023-05-02 DIAGNOSIS — K08.89 NON-RESTORABLE TOOTH: ICD-10-CM

## 2023-05-02 SDOH — ECONOMIC STABILITY - FOOD INSECURITY: FOOD INSECURITY: Z59.41

## 2023-05-02 NOTE — PROGRESS NOTES
Subjective   Patient ID: Cuauhtemoc Wong is a 28 y o  male  No chief complaint on file      Reviewed medical history   ASA  2    Patient presented with pain/ broken teeth  LR  # 30, 31    PAX and BWX taken   Patient states he had a dental exam not too long ago in another office ( [address]) but is now living in [facility]      Patient has Parkinson's Disease with spastic movement   He was able to hold still for JUANA and Jj HENDRIX    Referral given for OMS for extraction of # 30, 31 and possibly # 32, 17   After extractions, we will re eval TX plan and determine if we can treat here or if he needs to be seen in OR    Gave referral and copy of BW PAX # 30  Patient will attempt to obtain copy of x rays taken earlier this year    All questions answered

## 2023-05-05 ENCOUNTER — OFFICE VISIT (OUTPATIENT)
Dept: PHYSICAL THERAPY | Facility: CLINIC | Age: 33
End: 2023-05-05

## 2023-05-05 DIAGNOSIS — M25.512 ACUTE PAIN OF LEFT SHOULDER: ICD-10-CM

## 2023-05-05 DIAGNOSIS — Z98.890 STATUS POST LABRAL REPAIR OF SHOULDER: Primary | ICD-10-CM

## 2023-05-05 NOTE — PROGRESS NOTES
Re-Evaluation Note     Today's date: 2023  Patient name: Josh Bess  : 1990  MRN: [id number]  Referring provider: Stephen Pederson DO  Dx:   Encounter Diagnosis     ICD-10-CM    1  Status post labral repair of shoulder  Z98 890       2  Acute pain of left shoulder  M25 512           Start Time: 1010  Stop Time: 1030  Total time in clinic (min): 20 minutes    Subjective: pt reports that his R shoulder pain feels the same as to what it was pre-surgery, and that he's worried that he damaged his shoulder  He reports that his shoulder ROM has improved since beginning PT intervention  Objective: See treatment diary below      Assessment: Tolerated treatment well  Patient demonstrated fatigue post treatment, exhibited good technique with therapeutic exercises and would benefit from continued PT  Pt 10 minutes late and accounted for Pt 9 0 weeks s/p  Pt tolerated today's session well as per appropriate response to intervention  He demonstrated full shoulder PROM and AROM shoulder elevation today's session, but continues to demonstrate limited IR/ER PROM  Demonstrated decreased functional mobility as per FOTO, may be due to increased pain  Progressed Strengthening within protocol, added standing ITYs and he tolerated movement tremors from PD better while performing with weight  He continues to have a weak and painful L shoulder s/p surgery; and he would benefit from continued, skilled PT to improve impairments to improve QoL  1:1 with Isidro Reynoso DPT for entirety of tx  Plan: Continue per plan of care  Progress note during next visit        Precautions: HTN, PD, history of HPV    Pertinent Findings:                                                                                                                                                     Test / Measure  23   FOTO (pred 68) 29 51 43   L [place] PROM Flex - 120  ABD - 120  IR - 45  ER - 30  Flex - 180  ABD - 180  IR - 50  ER "- 70   L Shoulder AROM Not assessed as per protocol  Flex - 180  ABD - 180  FIR - NV  FIR - NV   L Shoulder MMT Not assessed as per protocol  Shoulder elevation - 3+/5  IR/ER - 2+       Manuals 4/20 4/25 4/27 5/1 5/5        PROM HY BV BV BV BV                                               Neuro Re-Ed 4/20 4/25 4/27 5/1 5/5        Pendulums 2'            Scap Squeezes             Shoulder Shrugs             Elbow Flex/Ext AROM             Shoulder ISO @ Wall: ER, ABD 10\"x15 ER, IR, ABD            Towel Slides at [address] flex/  ABD/  scap                         Push-Up Plus                                                                 FOTO  BV           Pt education  BV           Ther Ex 4/20 4/25 4/27 5/1 5/5        Pulleys             [place]: Rows, Ext    8#/ 2x15 ea         Ball Wall Circles             Standing ITYs     3# 2x15 ea        Gravity Eliminated Shoulder Elevation    Flex/  ABD  x10 ea Flex  x15    ABD 2x15                                                Ther Activity 4/20 4/25 4/27 5/1 5/5                                  Gait Training 4/20 4/25 4/27 5/1 5/5                                  Modalities 4/20 4/25 4/27 5/5                                       "

## 2023-05-09 ENCOUNTER — OFFICE VISIT (OUTPATIENT)
Dept: PHYSICAL THERAPY | Facility: CLINIC | Age: 33
End: 2023-05-09

## 2023-05-09 DIAGNOSIS — M25.512 ACUTE PAIN OF LEFT SHOULDER: ICD-10-CM

## 2023-05-09 DIAGNOSIS — Z98.890 STATUS POST LABRAL REPAIR OF SHOULDER: Primary | ICD-10-CM

## 2023-05-09 NOTE — PROGRESS NOTES
Daily Note     Today's date: 2023  Patient name: Martinez Neville  : 1990  MRN: [id number]  Referring provider: Jillian Heimlich, DO  Dx:   Encounter Diagnosis     ICD-10-CM    1  Status post labral repair of shoulder  Z98 890       2  Acute pain of left shoulder  M25 512           Start Time: 1002  Stop Time: 1030  Total time in clinic (min): 28 minutes    Subjective: pt reports that he has had increased pain in his L shoulder because he started running and uphill sprinting  Objective: See treatment diary below  - pt had to take PD medication during today's session    Assessment: Tolerated treatment well  Patient demonstrated fatigue post treatment, exhibited good technique with therapeutic exercises and would benefit from continued PT   Pt tolerated today's session well as per appropriate response to intervention  He continues to demonstrate improving PROM; and even though he reports high pain, he demonstrates low irritability  Did not perform AAROM and AROM shoulder exercises due to stiffness from PD and medication not setting in yet, encouraged to perform at home  He would benefit from continued, skilled PT to improve impairments to improve QoL  1:1 with Dai Betancourt DPT for entirety of tx  Plan: Continue per plan of care        Precautions: HTN, PD, history of HPV    Pertinent Findings:                                                                                                                                                     Test / Measure  23   FOTO (pred 68) 29 51 43   L [place] PROM Flex - 120  ABD - 120  IR - 45  ER - 30  Flex - 180  ABD - 180  IR - 50  ER - 70   L Shoulder AROM Not assessed as per protocol  Flex - 180  ABD - 180  FIR - NV  FIR - NV   L Shoulder MMT Not assessed as per protocol  Shoulder elevation - 3+/5  IR/ER - 2+       Manuals        PROM HY BV BV BV BV BV                                              Neuro Re-Ed  "4/25 4/27 5/1 5/5 5/8       Pendulums 2'            Scap Squeezes             Shoulder Shrugs             Elbow Flex/Ext AROM             Shoulder ISO @ Wall: ER, ABD 10\"x15 ER, IR, ABD            Towel Slides at [address] flex/  ABD/  scap                         Push-Up Plus                                                                 FOTO  BV           Pt education  BV           Ther Ex 4/20 4/25 4/27 5/1 5/5 5/8       Pulleys             Nancy: Rows, Ext    8#/ 2x15 ea  11#/ 3x15 ea       Ball Wall Circles             Standing ITYs     3# 2x15 ea        Gravity Eliminated Shoulder Elevation    Flex/  ABD  x10 ea Flex  x15    ABD 2x15                                                Ther Activity 4/20 4/25 4/27 5/1 5/5 5/8                                 Gait Training 4/20 4/25 4/27 5/1 5/5 5/8                                 Modalities 4/20 4/25 4/27 5/5 5/8                                      "

## 2023-05-11 ENCOUNTER — APPOINTMENT (OUTPATIENT)
Dept: PHYSICAL THERAPY | Facility: CLINIC | Age: 33
End: 2023-05-11
Payer: COMMERCIAL

## 2023-05-16 ENCOUNTER — OFFICE VISIT (OUTPATIENT)
Dept: PHYSICAL THERAPY | Facility: CLINIC | Age: 33
End: 2023-05-16

## 2023-05-16 DIAGNOSIS — Z98.890 STATUS POST LABRAL REPAIR OF SHOULDER: Primary | ICD-10-CM

## 2023-05-16 DIAGNOSIS — M25.512 ACUTE PAIN OF LEFT SHOULDER: ICD-10-CM

## 2023-05-16 NOTE — PROGRESS NOTES
"Daily Note     Today's date: 2023  Patient name: Venecia Quintero  : 1990  MRN: [id number]  Referring provider: Amrit Morejon DO  Dx:   Encounter Diagnosis     ICD-10-CM    1  Status post labral repair of shoulder  Z98 890       2  Acute pain of left shoulder  M25 512           Start Time: 1058          Subjective: \"It feels okay today\"       Objective: See treatment diary below      Assessment: Rodrick Corbin presents to therapy with L shoulder pain  Noted continued stiffness throughout the shoulder in all planes  Reports he has bene using the arm more than he should at home  Improved scapular activation noted throughout  Tolerated session without adverse effects  Recommend continued skilled therapy to improve overall strength and mobility for functional return with decreased compensation and pain  Plan: Continue per plan of care        Precautions: HTN, PD, history of HPV    Pertinent Findings:                                                                                                                                                     Test / Measure  23   FOTO (pred 68) 29 51 43   L [place] PROM Flex - 120  ABD - 120  IR - 45  ER - 30  Flex - 180  ABD - 180  IR - 50  ER - 70   L Shoulder AROM Not assessed as per protocol  Flex - 180  ABD - 180  FIR - NV  FIR - NV   L Shoulder MMT Not assessed as per protocol  Shoulder elevation - 3+/5  IR/ER - 2+       Manuals  5/ 5//      PROM HY BV BV BV BV BV KR                                             Neuro Re-Ed  5/1 5/5 5/16      Pendulums 2'            Scap Squeezes             Shoulder Shrugs             Elbow Flex/Ext AROM             Shoulder ISO @ Wall: ER, ABD 10\"x15 ER, IR, ABD            Towel Slides at [address] flex/  ABD/  scap                         Push-Up Plus                                                                 FOTO  BV           Pt education  BV           Ther Ex " 4/20 4/25 4/27 5/1 5/5 5/8 5/16      Raul Cruz: Rows, Ext    8#/ 2x15 ea  11#/ 3x15 ea 11#/ 3x15 ea      Ball Wall Circles             Standing ITYs     3# 2x15 ea        Gravity Eliminated Shoulder Elevation    Flex/  ABD  x10 ea Flex  x15    ABD 2x15                                                Ther Activity 4/20 4/25 4/27 5/1 5/5 5/8 5/16                                Gait Training 4/20 4/25 4/27 5/1 5/5 5/8 5/16                                Modalities 4/20 4/25 4/27 5/5 5/8 5/16

## 2023-05-18 ENCOUNTER — OFFICE VISIT (OUTPATIENT)
Dept: PHYSICAL THERAPY | Facility: CLINIC | Age: 33
End: 2023-05-18

## 2023-05-18 DIAGNOSIS — Z98.890 STATUS POST LABRAL REPAIR OF SHOULDER: Primary | ICD-10-CM

## 2023-05-18 DIAGNOSIS — M25.512 ACUTE PAIN OF LEFT SHOULDER: ICD-10-CM

## 2023-05-18 NOTE — PROGRESS NOTES
"Daily Note     Today's date: 2023  Patient name: Pili Guerrero  : 1990  MRN: [id number]  Referring provider: Jenniffer Lloyd DO  Dx:   Encounter Diagnosis     ICD-10-CM    1  Status post labral repair of shoulder  Z98 890       2  Acute pain of left shoulder  M25 512                      Subjective: pt reports less shoulder pain overall, improvement in shoulder       Objective: See treatment diary below      Assessment: Tolerated treatment well  Good parascapular activation with tactile cuing during rows and extensions  Good tolerance to flexion and abduction to shoulder level  Patient continues to benefit from PROM to promote shoulder mobility  Continued PT would be beneficial to improve function           Plan: Continue per plan of care         Precautions: HTN, PD, history of HPV    Pertinent Findings:                                                                                                                                                     Test / Measure  23   FOTO (pred 68) 29 51 43   L [place] PROM Flex - 120  ABD - 120  IR - 45  ER - 30  Flex - 180  ABD - 180  IR - 50  ER - 70   L Shoulder AROM Not assessed as per protocol  Flex - 180  ABD - 180  FIR - NV  FIR - NV   L Shoulder MMT Not assessed as per protocol  Shoulder elevation - 3+/5  IR/ER - 2+       Manuals  5     PROM HY BV BV BV BV BV KR MB                                            Neuro Re-Ed  5/     Pendulums 2'            Scap Squeezes             Shoulder Shrugs             Elbow Flex/Ext AROM             Shoulder ISO @ Wall: ER, ABD 10\"x15 ER, IR, ABD       supineIR/ER MREs, towel roll prop min-A for stability 10x5 sec ea     Towel Slides at [address] flex/  ABD/  scap                         Push-Up Plus                                                                 FOTO  BV           Pt education  BV           Ther Ex  " 5/5 5/8 5/16      Raul Cruz: Rows, Ext    8#/ 2x15 ea  11#/ 3x15 ea 11#/ 3x15 ea 11#/ 3x15 ea     Ball Wall Circles             Standing ITYs     3# 2x15 ea  Red t-band shoulder height, I,T 2x10 Red t-band shoulder height, I,T 2x10     Gravity Eliminated Shoulder Elevation    Flex/  ABD  x10 ea Flex  x15    ABD 2x15                                                Ther Activity 4/20 4/25 4/27 5/1 5/5 5/8 5/16 5/18                               Gait Training 4/20 4/25 4/27 5/1 5/5 5/8 5/16 5/18                               Modalities 4/20 4/25 4/27 5/5 5/8 5/16 5/18

## 2023-05-19 ENCOUNTER — PATIENT OUTREACH (OUTPATIENT)
Dept: DENTISTRY | Facility: CLINIC | Age: 33
End: 2023-05-19

## 2023-05-19 NOTE — PROGRESS NOTES
[facility] referral received for food insecurity of [address] called and spoke with patient  He shares that he collects SSI and his checks have been cut back  Patient has SNAP  Patient has gone to two [employer] [facility] and was told his SSI check has been reduced because of going to the food [facility]  OP SW not aware of [employer] reporting to the state and provided additional resources  OP SW emailed [employer] resource list via email

## 2023-05-23 ENCOUNTER — OFFICE VISIT (OUTPATIENT)
Dept: PHYSICAL THERAPY | Facility: CLINIC | Age: 33
End: 2023-05-23

## 2023-05-23 DIAGNOSIS — Z98.890 STATUS POST LABRAL REPAIR OF SHOULDER: Primary | ICD-10-CM

## 2023-05-23 DIAGNOSIS — M25.512 ACUTE PAIN OF LEFT SHOULDER: ICD-10-CM

## 2023-05-23 NOTE — PROGRESS NOTES
"Daily Note     Today's date: 2023  Patient name: Davey Doyle  : 1990  MRN: [id number]  Referring provider: Eller Mcardle, DO  Dx:   Encounter Diagnosis     ICD-10-CM    1  Status post labral repair of shoulder  Z98 890       2  Acute pain of left shoulder  M25 512                      Subjective: \" I kind of messed up the shoulder the other day I grabbed onto something\" \" I am having some pain\"      Objective: See treatment diary below      Assessment: Evie Moran presents with shoulder pain  Focused on AROM and strengthening to tolerance  Rest breaks provided throughout due to increased fatigue  Increased difficulty noted with UT activation for shoulder shrugs  Improved AAROM noted throughout  Requires increased repetitions for improved activation overall improvement in mobility noted post  Tolerated session without adverse effects  Recommend continued skilled therapy to improve overall strength and mobility for functional return with decreased compensation and pain  Plan: Continue per plan of care         Precautions: HTN, PD, history of HPV    Pertinent Findings:                                                                                                                                                     Test / Measure  23   FOTO (pred 68) 29 51 43   L [place] PROM Flex - 120  ABD - 120  IR - 45  ER - 30  Flex - 180  ABD - 180  IR - 50  ER - 70   L Shoulder AROM Not assessed as per protocol  Flex - 180  ABD - 180  FIR - NV  FIR - NV   L Shoulder MMT Not assessed as per protocol  Shoulder elevation - 3+/5  IR/ER - 2+       Manuals  5/ 5/    PROM HY BV BV BV BV BV KR MB                                            Neuro Re-Ed  5/1 5/5 5/    Pendulums 2'            Scap Squeezes             Shoulder Shrugs         4# x 20    Elbow Flex/Ext AROM             Shoulder ISO @ Wall: ER, ABD 10\"x15 ER, IR, ABD       " "supineIR/ER MREs, towel roll prop min-A for stability 10x5 sec ea iso at all 5\" x 10    Towel Slides at [facility] ea flex/  ABD/  scap        Finger ladder x10 flex/abd                 Push-Up Plus                                                                 FOTO  BV           Pt education  BV       KR    Ther Ex 4/20 4/25 4/27 5/1 5/5 5/8 5/16 5/23    Pulleys             [place]: Rows, Ext    8#/ 2x15 ea  11#/ 3x15 ea 11#/ 3x15 ea 11#/ 3x15 ea 11#/ 3x15 ea    Ball Wall Circles         RMB x 20 ea    Standing ITYs     3# 2x15 ea  Red t-band shoulder height, I,T 2x10 Red t-band shoulder height, I,T 2x10 Red t-band shoulder height, I,T 2x10    Gravity Eliminated Shoulder Elevation    Flex/  ABD  x10 ea Flex  x15    ABD 2x15         bicep curls                                        Ther Activity 4/20 4/25 4/27 5/1 5/5 5/8 5/16 5/18                               Gait Training 4/20 4/25 4/27 5/1 5/5 5/8 5/16 5/18                               Modalities 4/20 4/25 4/27  5/5 5/8 5/16 5/18                                    "

## 2023-05-25 ENCOUNTER — TELEPHONE (OUTPATIENT)
Dept: OBGYN CLINIC | Facility: HOSPITAL | Age: 33
End: 2023-05-25

## 2023-05-25 ENCOUNTER — OFFICE VISIT (OUTPATIENT)
Dept: OBGYN CLINIC | Facility: CLINIC | Age: 33
End: 2023-05-25

## 2023-05-25 VITALS — HEIGHT: 67 IN | WEIGHT: 149 LBS | BODY MASS INDEX: 23.39 KG/M2 | OXYGEN SATURATION: 98 % | HEART RATE: 80 BPM

## 2023-05-25 DIAGNOSIS — M24.812 INTERNAL DERANGEMENT OF SHOULDER, LEFT: Primary | ICD-10-CM

## 2023-05-25 NOTE — TELEPHONE ENCOUNTER
Caller: Mariola Flynn    Doctor: Benny Hager    Reason for call: transfer to PT    Call back#: [phone]
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
## 2023-05-30 ENCOUNTER — APPOINTMENT (OUTPATIENT)
Dept: PHYSICAL THERAPY | Facility: CLINIC | Age: 33
End: 2023-05-30
Payer: COMMERCIAL

## 2023-06-01 ENCOUNTER — EVALUATION (OUTPATIENT)
Dept: PHYSICAL THERAPY | Facility: CLINIC | Age: 33
End: 2023-06-01

## 2023-06-01 DIAGNOSIS — M25.512 ACUTE PAIN OF LEFT SHOULDER: ICD-10-CM

## 2023-06-01 DIAGNOSIS — Z98.890 STATUS POST LABRAL REPAIR OF SHOULDER: Primary | ICD-10-CM

## 2023-06-01 NOTE — PROGRESS NOTES
PT Re-Evaluation     Today's date: 2023  Patient name: Maxim Roach  : 1990  MRN: [id number]  Referring provider: Sayda Alberto DO  Dx:   Encounter Diagnosis     ICD-10-CM    1  Status post labral repair of shoulder  Z98 890       2  Acute pain of left shoulder  M25 512                      Assessment  Assessment details:  Pt has attended 15 visits of physical therapy s/p L labral repair on 3/3/23  He demonstrates improved AROM, L UE strength, and improved function, however he has reports of high pain levels, and recalls a few incidents including grabbing onto a railing for balance that may have compromised his repair in the past couple months and notices popping and shifting in his L shoulder on occasion  Overall, his function has improved with reaching overhead, carrying, lifting, self-care tasks and ADLs  He continues to have some L shoulder weakness, ROM restrictions, and pain affecting his function with reaching behind his back, lifting overhead, and with household chores  He will benefit from another 2-4 weeks of skilled PT before DC to HEP  Impairments: abnormal or restricted ROM, activity intolerance, impaired physical strength, lacks appropriate home exercise program and pain with function  Barriers to therapy: Parkinson's influencing ability to follow immobilization phase within protocol   Understanding of Dx/Px/POC: excellent  Goals  Short Term Goals: In 2-4 weeks, the patient will:  1  Pt will demonstrate full PROM L shoulder elevation - met  2  Pt will demonstrate full PROM L shoulder ER/IR - met  3  Supervision with HEP for self care - met    Long Term Goals: At time of D/C, the patient will:  1  Pt will be able to return to boxing  2  FOTO to greater than predicted value  3   Independent with HEP for selfcare      Plan  Frequency: 2x week  Duration in visits: 4  Duration in weeks: 2          Subjective Evaluation    History of Present Illness  Mechanism of injury: Pt reports to PT evaluation status post left shoulder arthroscopy anterior and posterior labral repair performed on 3/3/2023  Pt does not present wearing sling to today's session due to his movement from Parkinson's  He notes that he has a lot of shoulder pain which limits his ability to lift it up in the air  His pain also limits his ability to get dressed, eat, bed mobility, perform yoga, and running  He reports that he has trouble keeping his arm still due to his Parkinson's disease where his shoulder gets painful because of it, and he is worried that he might retear his labrum and may need another surgery  23:  Pt reports he thinks he messed his shoulder up previously and is having pain and popping with movement and lifting  He follows up with ortho soon and is hoping to get new scans  Pain  Current pain ratin  At best pain ratin  At worst pain ratin  Location: L shoulder  Aggravating factors: keyboarding, overhead activity and lifting  Progression: worsening    Treatments  Previous treatment: physical therapy  Current treatment: medication and physical therapy  Patient Goals  Patient goals for therapy: decreased pain, increased motion, independence with ADLs/IADLs, return to sport/leisure activities, return to work and increased strength  Patient goal: to get his shoulder feeling as normal as possible      OBJECTIVE:    AROM: Goniometric measurement revealed the following findings  Shoulder ROM Right: Left:    Flexion 180 160   Abduction 180 160   TONYA T2 C6   FIR T8 L2     PROM: Goniometric measurement revealed the following findings  Shoulder ROM Right: Left:    Flexion 180 170   Abduction 180 170    80   IR 50 45     Strength: MMT revealed the following findings    Joint Motion Right:  Left:    Sh  Flexion 5/5 4/5   Sh  Abduction 5/5 4-/5   Sh  ER 5/5* 4/5   Sh  IR 5/5 4-/5**       Additional Assessments:   Joint mobility: empty end feel, L scap is hypermobile       Precautions: HTN, PD, "history of HPV    Pertinent Findings:                                                                                                                                                     Test / Measure  03/27/23   FOTO (pred 68) 29             Manuals 4/20 4/25 4/27 5/1 5/5 5/8 5/16 5/18 5/23 6/1   PROM HY BV BV BV BV BV KR MB  SF                                          Neuro Re-Ed 4/20 4/25 4/27 5/1 5/5 5/8 5/16 5/18 5/23 6/1   Pendulums 2'            Scap Squeezes             Shoulder Shrugs         4# x 20    Shoulder ISO @ Wall: ER, ABD 10\"x15 ER, IR, ABD       supineIR/ER MREs, towel roll prop min-A for stability 10x5 sec ea iso at all 5\" x 10    Towel Slides at [address] flex/  ABD/  scap        Finger ladder x10 flex/abd    Scap Punches          10x 5#   Push-Up Plus                                                                 FOTO  BV           Pt education  BV       KR SF   Ther Ex 4/20 4/25 4/27 5/1 5/5 5/8 5/16 5/23 6/1   Pulleys             Nancy: Rows, Ext    8#/ 2x15 ea  11#/ 3x15 ea 11#/ 3x15 ea 11#/ 3x15 ea 11#/ 3x15 ea 11#/ 3x15 ea   Ball Wall Circles         RMB x 20 ea YMB x 10 ea   Standing ITYs     3# 2x15 ea  Red t-band shoulder height, I,T 2x10 Red t-band shoulder height, I,T 2x10 Red t-band shoulder height, I,T 2x10 prone T's and Ys 2x10 2#   Gravity Eliminated Shoulder Elevation    Flex/  ABD  x10 ea Flex  x15    ABD 2x15         bicep curls              S/l ER          2x10 2# w PT assist                Ther Activity 4/20 4/25 4/27 5/1 5/5 5/8 5/16 5/18                               Gait Training 4/20 4/25 4/27 5/1 5/5 5/8 5/16 5/18                               Modalities 4/20 4/25 4/27  5/5 5/8 5/16 5/18                                    "

## 2023-06-12 ENCOUNTER — OFFICE VISIT (OUTPATIENT)
Dept: PHYSICAL THERAPY | Facility: CLINIC | Age: 33
End: 2023-06-12
Payer: COMMERCIAL

## 2023-06-12 DIAGNOSIS — Z98.890 STATUS POST LABRAL REPAIR OF SHOULDER: Primary | ICD-10-CM

## 2023-06-12 DIAGNOSIS — M25.512 ACUTE PAIN OF LEFT SHOULDER: ICD-10-CM

## 2023-06-12 PROCEDURE — 97110 THERAPEUTIC EXERCISES: CPT | Performed by: PHYSICAL THERAPIST

## 2023-06-12 PROCEDURE — 97140 MANUAL THERAPY 1/> REGIONS: CPT | Performed by: PHYSICAL THERAPIST

## 2023-06-12 NOTE — PROGRESS NOTES
"Daily Note     Today's date: 2023  Patient name: Tianna Ribeiro  : 1990  MRN: [id number]  Referring provider: Davie Soler DO  Dx:   Encounter Diagnosis     ICD-10-CM    1  Status post labral repair of shoulder  Z98 890       2  Acute pain of left shoulder  M25 512                      Subjective: Pt reports his shoulder is feeling better this week  Objective: See treatment diary below      Assessment: Pt does well w progression of treatment session today  He requires some cueing and support during s/l ER to avoid pinching in RTC and popping in scapula and was cued to avoid end-range ER  He is challenged w rhythmic stabilizations and fatigues quickly  Patient demonstrated fatigue post treatment and would benefit from continued PT  Plan: Continue per plan of care  Progress treatment as tolerated         Precautions: HTN, PD, history of HPV    Pertinent Findings:                                                                                                                                                     Test / Measure  23   FOTO (pred 68) 29       Manuals    PROM SF    BV BV KR MB  SF   Rhythmic Stabs SF 2x45\" flex                                      Neuro Re-Ed  6   Pendulums             Scap Squeezes             Shoulder Shrugs         4# x 20    Shoulder ISO @ Wall: ER, ABD        supineIR/ER MREs, towel roll prop min-A for stability 10x5 sec ea iso at all 5\" x 10    Towel Slides at [facility]         Finger ladder x10 flex/abd    Scap Punches 20x 5#         10x 5#   Push-Up Plus                                                                 FOTO             Pt education         KR SF   Ther Ex  6   Pulleys 5'            [place]: Rows, Ext 2x20 14#/11#     11#/ 3x15 ea 11#/ 3x15 ea 11#/ 3x15 ea 11#/ 3x15 ea 11#/ 3x15 ea   Ball Wall Circles         RMB x 20 ea YMB x 10 ea   Standing ITYs 2x10 ea " prone T's 2# and Ys    3# 2x15 ea  Red t-band shoulder height, I,T 2x10 Red t-band shoulder height, I,T 2x10 Red t-band shoulder height, I,T 2x10 prone T's and Ys 2x10 2#   Gravity Eliminated Shoulder Elevation              bicep curls              S/l ER 2x20 2# w PT assist         2x10 2# w PT assist                Ther Activity     5/5 5/8 5/16 5/18                               Gait Training     5/5 5/8 5/16 5/18                               Modalities     5/5 5/8 5/16 5/18

## 2023-06-15 ENCOUNTER — APPOINTMENT (OUTPATIENT)
Dept: PHYSICAL THERAPY | Facility: CLINIC | Age: 33
End: 2023-06-15
Payer: COMMERCIAL

## 2023-06-19 ENCOUNTER — APPOINTMENT (OUTPATIENT)
Dept: PHYSICAL THERAPY | Facility: CLINIC | Age: 33
End: 2023-06-19
Payer: COMMERCIAL

## 2023-06-22 ENCOUNTER — OFFICE VISIT (OUTPATIENT)
Dept: PHYSICAL THERAPY | Facility: CLINIC | Age: 33
End: 2023-06-22
Payer: COMMERCIAL

## 2023-06-22 DIAGNOSIS — Z98.890 STATUS POST LABRAL REPAIR OF SHOULDER: Primary | ICD-10-CM

## 2023-06-22 DIAGNOSIS — M25.512 ACUTE PAIN OF LEFT SHOULDER: ICD-10-CM

## 2023-06-22 PROCEDURE — 97112 NEUROMUSCULAR REEDUCATION: CPT

## 2023-06-22 PROCEDURE — 97110 THERAPEUTIC EXERCISES: CPT

## 2023-06-22 NOTE — PROGRESS NOTES
"Daily Note     Today's date: 2023  Patient name: Colette Benitez  : 1990  MRN: [id number]  Referring provider: Blanquita Butts DO  Dx:   Encounter Diagnosis     ICD-10-CM    1  Status post labral repair of shoulder  Z98 890       2  Acute pain of left shoulder  M25 512           Start Time: 1145  Stop Time: 1225  Total time in clinic (min): 40 minutes    Subjective: Patient reports having no pain in his shoulder but his shoulder blade feels loose  He feels twitchier than usual today because he is running low on his medications and only took one  Objective: See treatment diary below      Assessment: Patient tolerated session fairly, late arrival accommodated  Continued with scapular strengthening therex and benefits from therapist manual facilitation to promote scapular stability with distal mobility  Verbal and tactile cues required with most therex to promote technique considering involuntary motions from PD  Patient would benefit from continued skilled PT intervention to address remaining impairments and promote ease with ADLs and functional mobility  Plan: Continue with PT POC as tolerated  Consider increasing resistance with ni rows and extension next visit       Precautions: HTN, PD, history of HPV    Pertinent Findings:                                                                                                                                                     Test / Measure  23   FOTO (pred 68) 29       Manuals    5/5 / 6   PROM SF TS   BV BV KR MB  SF   Rhythmic Stabs SF 2x45\" flex                                      Neuro Re-Ed    5/5 5/8  6   Pendulums             Scap Squeezes             Shoulder Shrugs         4# x 20    Shoulder ISO @ Wall: ER, ABD        supineIR/ER MREs, towel roll prop min-A for stability 10x5 sec ea iso at all 5\" x 10    Towel Slides at Wall         Finger ladder x10 flex/abd    Scap Punches 20x " 5# 20x 5#        10x 5#   Push-Up Plus                                                                 FOTO             Pt education         KR SF   Ther Ex 6/12 6/22 5/5 5/8 5/16 5/23 6/1   Pulleys 5' 5'           [place]: Rows, Ext 2x20 14#/11# 2x20 14#/11#    11#/ 3x15 ea 11#/ 3x15 ea 11#/ 3x15 ea 11#/ 3x15 ea 11#/ 3x15 ea   Ball Wall Circles         RMB x 20 ea YMB x 10 ea   Standing ITYs 2x10 ea prone T's 2# and Ys NT   3# 2x15 ea  Red t-band shoulder height, I,T 2x10 Red t-band shoulder height, I,T 2x10 Red t-band shoulder height, I,T 2x10 prone T's and Ys 2x10 2#   Gravity Eliminated Shoulder Elevation              bicep curls              S/l ER 2x20 2# w PT assist 2x10 2# PT assist        2x10 2# w PT assist                Ther Activity     5/5 5/8 5/16 5/18                               Gait Training     5/5 5/8 5/16 5/18                               Modalities     5/5 5/8 5/16 5/18

## 2023-07-05 ENCOUNTER — APPOINTMENT (EMERGENCY)
Dept: CT IMAGING | Facility: HOSPITAL | Age: 33
End: 2023-07-05
Payer: COMMERCIAL

## 2023-07-05 ENCOUNTER — HOSPITAL ENCOUNTER (EMERGENCY)
Facility: HOSPITAL | Age: 33
Discharge: HOME | End: 2023-07-05
Attending: EMERGENCY MEDICINE
Payer: COMMERCIAL

## 2023-07-05 VITALS
SYSTOLIC BLOOD PRESSURE: 156 MMHG | DIASTOLIC BLOOD PRESSURE: 85 MMHG | TEMPERATURE: 97.7 F | RESPIRATION RATE: 18 BRPM | OXYGEN SATURATION: 100 % | HEART RATE: 77 BPM

## 2023-07-05 DIAGNOSIS — R11.0 NAUSEA: ICD-10-CM

## 2023-07-05 DIAGNOSIS — S39.011A STRAIN OF ABDOMINAL MUSCLE, INITIAL ENCOUNTER: ICD-10-CM

## 2023-07-05 DIAGNOSIS — R10.32 LEFT LOWER QUADRANT ABDOMINAL PAIN: Primary | ICD-10-CM

## 2023-07-05 DIAGNOSIS — K52.9 COLITIS: ICD-10-CM

## 2023-07-05 LAB
ALBUMIN SERPL BCP-MCNC: 4.7 G/DL (ref 3.5–5)
ALP SERPL-CCNC: 37 U/L (ref 34–104)
ALT SERPL W P-5'-P-CCNC: <3 U/L (ref 7–52)
ANION GAP SERPL CALCULATED.3IONS-SCNC: 4 MMOL/L
AST SERPL W P-5'-P-CCNC: 26 U/L (ref 13–39)
BASOPHILS # BLD AUTO: 0.06 THOUSANDS/ÂΜL (ref 0–0.1)
BASOPHILS NFR BLD AUTO: 1 % (ref 0–1)
BILIRUB SERPL-MCNC: 0.6 MG/DL (ref 0.2–1)
BUN SERPL-MCNC: 16 MG/DL (ref 5–25)
CALCIUM SERPL-MCNC: 9.1 MG/DL (ref 8.4–10.2)
CHLORIDE SERPL-SCNC: 103 MMOL/L (ref 96–108)
CO2 SERPL-SCNC: 27 MMOL/L (ref 21–32)
CREAT SERPL-MCNC: 0.77 MG/DL (ref 0.6–1.3)
EOSINOPHIL # BLD AUTO: 0.21 THOUSAND/ÂΜL (ref 0–0.61)
EOSINOPHIL NFR BLD AUTO: 5 % (ref 0–6)
ERYTHROCYTE [DISTWIDTH] IN BLOOD BY AUTOMATED COUNT: 11.5 % (ref 11.6–15.1)
GFR SERPL CREATININE-BSD FRML MDRD: 118 ML/MIN/1.73SQ M
GLUCOSE SERPL-MCNC: 86 MG/DL (ref 65–140)
HCT VFR BLD AUTO: 44.8 % (ref 36.5–49.3)
HGB BLD-MCNC: 15.5 G/DL (ref 12–17)
IMM GRANULOCYTES # BLD AUTO: 0.01 THOUSAND/UL (ref 0–0.2)
IMM GRANULOCYTES NFR BLD AUTO: 0 % (ref 0–2)
LIPASE SERPL-CCNC: 15 U/L (ref 11–82)
LYMPHOCYTES # BLD AUTO: 1.34 THOUSANDS/ÂΜL (ref 0.6–4.47)
LYMPHOCYTES NFR BLD AUTO: 30 % (ref 14–44)
MCH RBC QN AUTO: 32.7 PG (ref 26.8–34.3)
MCHC RBC AUTO-ENTMCNC: 34.6 G/DL (ref 31.4–37.4)
MCV RBC AUTO: 95 FL (ref 82–98)
MONOCYTES # BLD AUTO: 0.37 THOUSAND/ÂΜL (ref 0.17–1.22)
MONOCYTES NFR BLD AUTO: 8 % (ref 4–12)
NEUTROPHILS # BLD AUTO: 2.54 THOUSANDS/ÂΜL (ref 1.85–7.62)
NEUTS SEG NFR BLD AUTO: 56 % (ref 43–75)
NRBC BLD AUTO-RTO: 0 /100 WBCS
PLATELET # BLD AUTO: 264 THOUSANDS/UL (ref 149–390)
PMV BLD AUTO: 9.2 FL (ref 8.9–12.7)
POTASSIUM SERPL-SCNC: 5.2 MMOL/L (ref 3.5–5.3)
PROT SERPL-MCNC: 7.3 G/DL (ref 6.4–8.4)
RBC # BLD AUTO: 4.74 MILLION/UL (ref 3.88–5.62)
SODIUM SERPL-SCNC: 134 MMOL/L (ref 135–147)
WBC # BLD AUTO: 4.53 THOUSAND/UL (ref 4.31–10.16)

## 2023-07-05 PROCEDURE — 85025 COMPLETE CBC W/AUTO DIFF WBC: CPT | Performed by: PHYSICIAN ASSISTANT

## 2023-07-05 PROCEDURE — 83690 ASSAY OF LIPASE: CPT | Performed by: PHYSICIAN ASSISTANT

## 2023-07-05 PROCEDURE — 74177 CT ABD & PELVIS W/CONTRAST: CPT

## 2023-07-05 PROCEDURE — 80053 COMPREHEN METABOLIC PANEL: CPT | Performed by: PHYSICIAN ASSISTANT

## 2023-07-05 PROCEDURE — 36415 COLL VENOUS BLD VENIPUNCTURE: CPT | Performed by: PHYSICIAN ASSISTANT

## 2023-07-05 RX ORDER — ONDANSETRON 2 MG/ML
4 INJECTION INTRAMUSCULAR; INTRAVENOUS ONCE
Status: COMPLETED | OUTPATIENT
Start: 2023-07-05 | End: 2023-07-05

## 2023-07-05 RX ORDER — ONDANSETRON 4 MG/1
4 TABLET, ORALLY DISINTEGRATING ORAL EVERY 6 HOURS PRN
Qty: 20 TABLET | Refills: 0 | Status: SHIPPED | OUTPATIENT
Start: 2023-07-05

## 2023-07-05 RX ORDER — KETOROLAC TROMETHAMINE 30 MG/ML
15 INJECTION, SOLUTION INTRAMUSCULAR; INTRAVENOUS ONCE
Status: COMPLETED | OUTPATIENT
Start: 2023-07-05 | End: 2023-07-05

## 2023-07-05 RX ORDER — ACETAMINOPHEN 325 MG/1
975 TABLET ORAL ONCE
Status: COMPLETED | OUTPATIENT
Start: 2023-07-05 | End: 2023-07-05

## 2023-07-05 RX ORDER — DICYCLOMINE HCL 20 MG
20 TABLET ORAL ONCE
Status: DISCONTINUED | OUTPATIENT
Start: 2023-07-05 | End: 2023-07-05 | Stop reason: HOSPADM

## 2023-07-05 RX ADMIN — ACETAMINOPHEN 325MG 975 MG: 325 TABLET ORAL at 12:13

## 2023-07-05 RX ADMIN — KETOROLAC TROMETHAMINE 15 MG: 30 INJECTION, SOLUTION INTRAMUSCULAR; INTRAVENOUS at 08:45

## 2023-07-05 RX ADMIN — ONDANSETRON 4 MG: 2 INJECTION INTRAMUSCULAR; INTRAVENOUS at 08:37

## 2023-07-05 RX ADMIN — IOHEXOL 100 ML: 350 INJECTION, SOLUTION INTRAVENOUS at 10:26
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
## 2023-07-31 ENCOUNTER — TELEPHONE (OUTPATIENT)
Dept: RADIOLOGY | Facility: HOSPITAL | Age: 33
End: 2023-07-31

## 2023-07-31 NOTE — NURSING NOTE
Call placed to pt to discuss upcoming appointment at [facility] Rad[facility] Department and consultation completed. Pt is having a L Shoulder Arthrogram completed on 8/2/2023. Allergies reviewed and verified pt does not currently take any anticoagulant medications. Pre procedure instructions including diet and taking own medications discussed with pt. Pt instructed that he may eat normally and take medications as usual before the procedure. Pt verbalized understanding of instructions given. Reminded pt of the location, date and time of procedure. My number was given to call if any questions or concerns arise pre or post procedure.

## 2023-08-02 ENCOUNTER — HOSPITAL ENCOUNTER (OUTPATIENT)
Dept: RADIOLOGY | Facility: HOSPITAL | Age: 33
Discharge: HOME | End: 2023-08-02
Attending: ORTHOPAEDIC SURGERY | Admitting: RADIOLOGY
Payer: COMMERCIAL

## 2023-08-02 ENCOUNTER — HOSPITAL ENCOUNTER (OUTPATIENT)
Dept: MRI IMAGING | Facility: HOSPITAL | Age: 33
Discharge: HOME | End: 2023-08-02
Attending: ORTHOPAEDIC SURGERY
Payer: COMMERCIAL

## 2023-08-02 DIAGNOSIS — M24.812 INTERNAL DERANGEMENT OF SHOULDER, LEFT: ICD-10-CM

## 2023-08-02 PROCEDURE — 23350 INJECTION FOR SHOULDER X-RAY: CPT

## 2023-08-02 PROCEDURE — 73222 MRI JOINT UPR EXTREM W/DYE: CPT

## 2023-08-02 PROCEDURE — 77002 NEEDLE LOCALIZATION BY XRAY: CPT

## 2023-08-02 PROCEDURE — G1004 CDSM NDSC: HCPCS

## 2023-08-02 PROCEDURE — A9585 GADOBUTROL INJECTION: HCPCS | Performed by: ORTHOPAEDIC SURGERY

## 2023-08-02 RX ORDER — LIDOCAINE HYDROCHLORIDE 10 MG/ML
7 INJECTION, SOLUTION EPIDURAL; INFILTRATION; INTRACAUDAL; PERINEURAL
Status: DISCONTINUED | OUTPATIENT
Start: 2023-08-02 | End: 2023-08-03 | Stop reason: HOSPADM

## 2023-08-02 RX ADMIN — IOHEXOL 1 ML: 300 INJECTION, SOLUTION INTRAVENOUS at 14:52

## 2023-08-02 RX ADMIN — GADOBUTROL 13 ML: 604.72 INJECTION INTRAVENOUS at 14:52

## 2023-08-21 ENCOUNTER — TELEPHONE (OUTPATIENT)
Dept: PSYCHIATRY | Facility: CLINIC | Age: 33
End: 2023-08-21

## 2023-08-21 NOTE — TELEPHONE ENCOUNTER
Sent Wait List Letter VIA [employer] . Verify patients need of services from wait list after 15 days.  If no communication remove from NON-REFERRAL wait list.

## 2023-08-21 NOTE — LETTER
Dear eHlena [place] : We are contacting you because your name is currently included on the [address] [place] [address] wait-list for Talk Therapy and/or Medication Management. (Please [facility] which services are needed)     In our efforts to provide the highest quality care, [facility] has begun the process of upgrading our behavioral health systems to increase efficiency and expedite delivery of services. As part of this process, we ask you to please confirm your continued interest in the services above. If you are no longer interested or in need, please gen “No” in the area below. If you are still interested and in need, please gen “Yes” and provide your most current demographic and insurance information within 15 days. If we do not receive confirmation from you by 2023 your information will not be included in the system upgrade and your place on the waitlist will be lost.     Thank you in advance for your patience and understanding and we apologize for any inconvenience this may cause. Patient Name and :    Still in need of services: Yes or No     Current Address:     Phone#:     Best time to receive a call: Insurance Carrier:      Policy/ID#: Group#: Insurance Services Phone#:      What is your current presenting problem? Open to virtual talk therapy: Yes or No      We will call you to do an Intake when an appointment becomes available. You can send this information back to us in any of the ways below:    Email: [email or web address]. Diana Pate  Fax#:  [phone]  Mail:   [address] [place] [address], [address] [place] [address]

## 2023-08-24 ENCOUNTER — OFFICE VISIT (OUTPATIENT)
Dept: GASTROENTEROLOGY | Facility: MEDICAL CENTER | Age: 33
End: 2023-08-24
Payer: COMMERCIAL

## 2023-08-24 VITALS
TEMPERATURE: 97.4 F | DIASTOLIC BLOOD PRESSURE: 67 MMHG | SYSTOLIC BLOOD PRESSURE: 109 MMHG | HEART RATE: 72 BPM | BODY MASS INDEX: 22.93 KG/M2 | WEIGHT: 146.4 LBS

## 2023-08-24 DIAGNOSIS — K31.89 GASTRIC WALL THICKENING: Primary | ICD-10-CM

## 2023-08-24 DIAGNOSIS — K29.70 GASTRITIS, PRESENCE OF BLEEDING UNSPECIFIED, UNSPECIFIED CHRONICITY, UNSPECIFIED GASTRITIS TYPE: ICD-10-CM

## 2023-08-24 DIAGNOSIS — G20 PARKINSON'S SYNDROME (HCC): ICD-10-CM

## 2023-08-24 DIAGNOSIS — K59.00 CONSTIPATION, UNSPECIFIED CONSTIPATION TYPE: ICD-10-CM

## 2023-08-24 DIAGNOSIS — A04.8 H. PYLORI INFECTION: ICD-10-CM

## 2023-08-24 PROCEDURE — 99244 OFF/OP CNSLTJ NEW/EST MOD 40: CPT | Performed by: STUDENT IN AN ORGANIZED HEALTH CARE EDUCATION/TRAINING PROGRAM

## 2023-08-24 RX ORDER — SENNOSIDES 8.6 MG
8.6 TABLET ORAL
Qty: 30 TABLET | Refills: 11 | Status: SHIPPED | OUTPATIENT
Start: 2023-08-24

## 2023-08-24 NOTE — PATIENT INSTRUCTIONS
Submit a stool sample to the lab to test you for H. pylori. If you have an H. pylori infection, we will treat this with antibiotics  Try taking senna as needed to help have bowel movements.   If it is hard to swallow the senna pills, you can try using smooth move tea
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
## 2023-08-29 ENCOUNTER — APPOINTMENT (OUTPATIENT)
Dept: LAB | Facility: MEDICAL CENTER | Age: 33
End: 2023-08-29
Payer: COMMERCIAL

## 2023-08-29 ENCOUNTER — TELEPHONE (OUTPATIENT)
Dept: GASTROENTEROLOGY | Facility: MEDICAL CENTER | Age: 33
End: 2023-08-29

## 2023-08-29 DIAGNOSIS — A04.8 H. PYLORI INFECTION: ICD-10-CM

## 2023-08-29 DIAGNOSIS — K31.89 GASTRIC WALL THICKENING: ICD-10-CM

## 2023-08-29 DIAGNOSIS — K29.70 GASTRITIS, PRESENCE OF BLEEDING UNSPECIFIED, UNSPECIFIED CHRONICITY, UNSPECIFIED GASTRITIS TYPE: ICD-10-CM

## 2023-08-29 PROCEDURE — 87338 HPYLORI STOOL AG IA: CPT

## 2023-08-29 NOTE — TELEPHONE ENCOUNTER
I agree that patient should complete H pylori testing prior to initiating treatment given his results were from 2 years ago and we do not have complete records.

## 2023-08-29 NOTE — TELEPHONE ENCOUNTER
Patient came in stating that he would like to know if he can get the medication to just treat H. Pylori since he tested positive  Already 2 years ago. States he still has it since he never got treated for it . I explained since it was 2 years ago since being tested that's probably why the provider wants a repeat stool test done. Patient still insist stating he would already like to get started on the treatment because of his symptoms and does not want to wait for stool test to come back to treat. Advise patient to still  stool kit and stated I would send a message to provider to see what they advise. Thank you .

## 2023-09-05 ENCOUNTER — NURSE TRIAGE (OUTPATIENT)
Age: 33
End: 2023-09-05

## 2023-09-05 NOTE — TELEPHONE ENCOUNTER
Spoke with patient and advised that his stool test results are still pending and [facility] be [facility] the results are made final.     ----- Message from Christo Montoya sent at 9/5/2023 10:44 AM EDT -----  Pt calling in regarding test results from stool sample

## 2023-09-07 ENCOUNTER — APPOINTMENT (OUTPATIENT)
Dept: LAB | Facility: MEDICAL CENTER | Age: 33
End: 2023-09-07
Payer: COMMERCIAL

## 2023-09-07 DIAGNOSIS — K29.70 GASTRITIS, PRESENCE OF BLEEDING UNSPECIFIED, UNSPECIFIED CHRONICITY, UNSPECIFIED GASTRITIS TYPE: ICD-10-CM

## 2023-09-07 DIAGNOSIS — A04.8 H. PYLORI INFECTION: ICD-10-CM

## 2023-09-07 DIAGNOSIS — K31.89 GASTRIC WALL THICKENING: ICD-10-CM

## 2023-09-07 PROCEDURE — 87338 HPYLORI STOOL AG IA: CPT

## 2023-09-08 LAB — H PYLORI AG STL QL IA: NEGATIVE

## 2023-09-12 ENCOUNTER — TELEPHONE (OUTPATIENT)
Age: 33
End: 2023-09-12

## 2023-09-12 NOTE — TELEPHONE ENCOUNTER
Called and spoke with patient. He states he has had these symptoms for 2 years. He would like a sooner apt. He believes he has H-Pylori. Office visit scheduled. I advised patient to go to the ER if his symptoms get worse or become severe. Patient verbalized understanding

## 2023-09-12 NOTE — TELEPHONE ENCOUNTER
Patients GI provider:  Dr. Yusef Baca    Number to return call: [phone]    Reason for call: Pt calling stating he is so much pain due to this H. Pylori, he's not eat well due to feeling so gassy and bloated. He states he can't pass gas, he can't burp. He's stated he's using stool softeners to help stool come out because it gets stuck.  Patient really would like a sooner appt / agreed to await call back    Scheduled procedure/appointment date if applicable: 95/79/6512 FOLLOW UP : Yes

## 2023-09-20 ENCOUNTER — OFFICE VISIT (OUTPATIENT)
Dept: GASTROENTEROLOGY | Facility: MEDICAL CENTER | Age: 33
End: 2023-09-20
Payer: COMMERCIAL

## 2023-09-20 VITALS
DIASTOLIC BLOOD PRESSURE: 80 MMHG | SYSTOLIC BLOOD PRESSURE: 122 MMHG | TEMPERATURE: 98.4 F | HEART RATE: 71 BPM | BODY MASS INDEX: 23.24 KG/M2 | WEIGHT: 148.4 LBS

## 2023-09-20 DIAGNOSIS — G20.A1 PARKINSON'S SYNDROME: ICD-10-CM

## 2023-09-20 DIAGNOSIS — K59.00 CONSTIPATION, UNSPECIFIED CONSTIPATION TYPE: ICD-10-CM

## 2023-09-20 DIAGNOSIS — R13.10 DYSPHAGIA, UNSPECIFIED TYPE: Primary | ICD-10-CM

## 2023-09-20 DIAGNOSIS — A04.8 H. PYLORI INFECTION: ICD-10-CM

## 2023-09-20 PROCEDURE — 99213 OFFICE O/P EST LOW 20 MIN: CPT | Performed by: STUDENT IN AN ORGANIZED HEALTH CARE EDUCATION/TRAINING PROGRAM

## 2023-09-20 RX ORDER — SENNOSIDES 8.6 MG
8.6 TABLET ORAL
Qty: 30 TABLET | Refills: 11 | Status: SHIPPED | OUTPATIENT
Start: 2023-09-20
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
## 2023-09-21 ENCOUNTER — TELEPHONE (OUTPATIENT)
Age: 33
End: 2023-09-21

## 2023-09-21 NOTE — TELEPHONE ENCOUNTER
Patients GI provider:  Dr. Luis Base    Number to return call: [phone]    Reason for call: Patient calling as he was informed that Nolan needs a prior authorization through his insurance.       Scheduled procedure/appointment date if applicable: Apt 32/80/46

## 2023-09-25 ENCOUNTER — TELEPHONE (OUTPATIENT)
Age: 33
End: 2023-09-25

## 2023-10-16 ENCOUNTER — OFFICE VISIT (OUTPATIENT)
Dept: GASTROENTEROLOGY | Facility: MEDICAL CENTER | Age: 33
End: 2023-10-16
Payer: COMMERCIAL

## 2023-10-16 VITALS
HEART RATE: 89 BPM | SYSTOLIC BLOOD PRESSURE: 140 MMHG | BODY MASS INDEX: 23.02 KG/M2 | DIASTOLIC BLOOD PRESSURE: 85 MMHG | WEIGHT: 147 LBS | TEMPERATURE: 97.6 F

## 2023-10-16 DIAGNOSIS — R93.3 ABNORMAL CT SCAN, GASTROINTESTINAL TRACT: ICD-10-CM

## 2023-10-16 DIAGNOSIS — R63.4 WEIGHT LOSS, ABNORMAL: ICD-10-CM

## 2023-10-16 DIAGNOSIS — K31.89 GASTRIC WALL THICKENING: ICD-10-CM

## 2023-10-16 DIAGNOSIS — K59.00 CONSTIPATION, UNSPECIFIED CONSTIPATION TYPE: Primary | ICD-10-CM

## 2023-10-16 DIAGNOSIS — K29.70 GASTRITIS, PRESENCE OF BLEEDING UNSPECIFIED, UNSPECIFIED CHRONICITY, UNSPECIFIED GASTRITIS TYPE: ICD-10-CM

## 2023-10-16 DIAGNOSIS — K59.00 DIFFICULTY PASSING STOOL: ICD-10-CM

## 2023-10-16 DIAGNOSIS — R10.84 GENERALIZED ABDOMINAL PAIN: ICD-10-CM

## 2023-10-16 PROCEDURE — 99214 OFFICE O/P EST MOD 30 MIN: CPT | Performed by: STUDENT IN AN ORGANIZED HEALTH CARE EDUCATION/TRAINING PROGRAM

## 2023-10-16 RX ORDER — POLYETHYLENE GLYCOL 3350 17 G/17G
POWDER, FOR SOLUTION ORAL
Qty: 238 G | Refills: 0 | Status: SHIPPED | OUTPATIENT
Start: 2023-10-16
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
## 2023-10-18 ENCOUNTER — TELEPHONE (OUTPATIENT)
Dept: GASTROENTEROLOGY | Facility: MEDICAL CENTER | Age: 33
End: 2023-10-18

## 2023-10-18 NOTE — TELEPHONE ENCOUNTER
LVM attempting to confirm upcoming procedure, informing that a  is needed, that bowel prep medication sent to pharmacy, and that instructions sent via [address] [place] [address].

## 2023-10-23 ENCOUNTER — HOSPITAL ENCOUNTER (OUTPATIENT)
Dept: RADIOLOGY | Facility: HOSPITAL | Age: 33
Discharge: HOME | End: 2023-10-23
Attending: STUDENT IN AN ORGANIZED HEALTH CARE EDUCATION/TRAINING PROGRAM
Payer: COMMERCIAL

## 2023-10-23 DIAGNOSIS — K59.00 DIFFICULTY PASSING STOOL: ICD-10-CM

## 2023-10-23 DIAGNOSIS — R10.84 GENERALIZED ABDOMINAL PAIN: ICD-10-CM

## 2023-10-23 DIAGNOSIS — R93.3 ABNORMAL CT SCAN, GASTROINTESTINAL TRACT: ICD-10-CM

## 2023-10-23 PROCEDURE — 74248 X-RAY SM INT F-THRU STD: CPT

## 2023-10-23 PROCEDURE — 74246 X-RAY XM UPR GI TRC 2CNTRST: CPT

## 2023-10-25 ENCOUNTER — TELEPHONE (OUTPATIENT)
Age: 33
End: 2023-10-25

## 2023-10-25 NOTE — TELEPHONE ENCOUNTER
Patients GI provider:  Dr. Shwetha Lisa    Number to return call: [phone]    Reason for call: Pt calling to inform he is very scared to undergo anesthesia for this procedure especially since his blood pressure has been running very low. Pt also does not think he has to have the EGD done because he is certain the issues are related to a mass in his rectum. Pt asking what to do in this circumstance, can he refuse the EGD and only have the Colonoscopy? Pt asking for a call back.      Scheduled procedure/appointment date if applicable: 50/07/4920 - Dr. Iban Cleveland - Colonoscopy/EGD

## 2023-10-25 NOTE — TELEPHONE ENCOUNTER
Called PT 2 times it seemed like someone picked up but could no hear anyone nor did a answering machine message play will call again

## 2023-10-25 NOTE — TELEPHONE ENCOUNTER
I would recommend the EGD as there has been some ambiguity about whether he has H pylori or not and EGD will enable repeat biopsies for H pylori and he also had some mild stomach thickening on his CT. Adding the EGD to the colonoscopy does not substantially increase his anesthesia risk or the length of the procedure. The decision is ultimately up to him so if he wants to refuse the EGD, we can do the colonoscopy only but I would recommend both the EGD and colonoscopy at the same time.

## 2023-10-26 ENCOUNTER — TELEPHONE (OUTPATIENT)
Age: 33
End: 2023-10-26

## 2023-10-26 RX ORDER — SODIUM CHLORIDE, SODIUM LACTATE, POTASSIUM CHLORIDE, CALCIUM CHLORIDE 600; 310; 30; 20 MG/100ML; MG/100ML; MG/100ML; MG/100ML
50 INJECTION, SOLUTION INTRAVENOUS CONTINUOUS
Status: CANCELLED | OUTPATIENT
Start: 2023-10-26

## 2023-10-26 NOTE — TELEPHONE ENCOUNTER
Patient calling in, he is scheduled for procedures tomorrow, has not been on a clear liquid diet today and not started his prep. He explains with his parkinson medications he can not take them without eat. I reviewed Dr. Ermelinda Petersen message to patient, he does not know if he wants to have EGD done and is asking if there is a way tomorrow the camera can just be inserted an inch or two into his rectum to see the mass he feels. I explained we would not be able to do that and he would need to still prep for any type of procedure. He is concerned with his low BP and going under anesthesia . I tried to reassure patient with being monitored while under sedation and vitals take prior.

## 2023-10-26 NOTE — TELEPHONE ENCOUNTER
Called the other contacts I his chart when I called his son someone answered and when I asked if the person who picked up was the son he stated he was not the person listed him Pts chart I called his mother juan as well and got her voicemail message and left a detailed callback message stating we are trying to get in tough with albino her son the Pt with a message from  after he palacios in with concerns and we have been unable to get in touch with albino the PT and that if there is a way he she can help get in touch the with the PT and that when I call I don't hear anyone or hear a voicemail message play when calling and left a call back number

## 2023-10-26 NOTE — TELEPHONE ENCOUNTER
Patients GI provider:  Dr. Jj Cheatham    Number to return call: [phone]    Reason for call: Pt calling with questions about anesthesia for his proced tomorrow. Transferred to [facility] in triage for further assistance.     Scheduled procedure/appointment date if applicable: Procedure 44/77/2615

## 2023-10-26 NOTE — TELEPHONE ENCOUNTER
Pt called into call center, tried to reach lexus grimaldo. Tried to transfer pt to nurse line but the call got stuck in the queue. Once I rebooted I called pt back and lm advise I am routing a message to the office to call back. Pt can be reached at [phone].

## 2023-10-26 NOTE — TELEPHONE ENCOUNTER
Called his sister Torres New in his contact to try another way of contact and got the voicemail and left a message stating that we have been trying to get in touch with albino the PT and have unsuccessfully in being able to talk to him and he are trying to get him a message from [facility], [facility] and [facility]

## 2023-10-26 NOTE — TELEPHONE ENCOUNTER
Called PT multiple times and was unable to hear anyone on the phone nor did the a voicemail message play I tried to leave a message that we are calling him to give him a message for  and that I have been unable to hear him or a message play if not picked up I o not know if it is our problem or the problem his on his side with the phones

## 2023-10-26 NOTE — TELEPHONE ENCOUNTER
Pt returned called and I spoke to Pt to inform them of Dr. Darwin Hogue recommendation to add EGD to colonoscopy to get biopsies to determine H.pylori and monitor stomach thickening. Pt was insistent on having colonoscopy to get rectum mass checked as their BP runs low and they are afraid of anesthesia. Explained Pt in detail that as per Dr. Darwin Hogue advise adding EGD to colonoscopy will not increase their risk of anesthesia as they will already be under it for the colonoscopy. Reminded Pt that the ultimate decision is theirs to decide if they would go for both procedures or only one. Pt told me to hold on line for a few minutes as they answer another call but the call dropped after some time of holding. Thanks!

## 2023-10-26 NOTE — TELEPHONE ENCOUNTER
Pt returned call regarding above matter. Pt states he has not started his prep yet. Pt expressing concern over being put under anesthesia. Please reach out to pt at [phone].

## 2023-10-27 ENCOUNTER — HOSPITAL ENCOUNTER (OUTPATIENT)
Dept: GASTROENTEROLOGY | Facility: HOSPITAL | Age: 33
Setting detail: OUTPATIENT SURGERY
Discharge: HOME | End: 2023-10-27
Attending: STUDENT IN AN ORGANIZED HEALTH CARE EDUCATION/TRAINING PROGRAM
Payer: COMMERCIAL

## 2023-10-27 VITALS
RESPIRATION RATE: 15 BRPM | SYSTOLIC BLOOD PRESSURE: 173 MMHG | TEMPERATURE: 97.5 F | OXYGEN SATURATION: 96 % | HEART RATE: 81 BPM | DIASTOLIC BLOOD PRESSURE: 106 MMHG

## 2023-10-27 DIAGNOSIS — K31.89 GASTRIC WALL THICKENING: ICD-10-CM

## 2023-10-27 DIAGNOSIS — K29.70 GASTRITIS, PRESENCE OF BLEEDING UNSPECIFIED, UNSPECIFIED CHRONICITY, UNSPECIFIED GASTRITIS TYPE: ICD-10-CM

## 2023-10-27 DIAGNOSIS — R63.4 WEIGHT LOSS, ABNORMAL: ICD-10-CM

## 2023-10-27 DIAGNOSIS — K59.00 DIFFICULTY PASSING STOOL: ICD-10-CM

## 2023-10-27 DIAGNOSIS — R93.3 ABNORMAL CT SCAN, GASTROINTESTINAL TRACT: ICD-10-CM

## 2023-10-27 DIAGNOSIS — K59.00 CONSTIPATION, UNSPECIFIED CONSTIPATION TYPE: ICD-10-CM

## 2023-10-27 PROCEDURE — 88305 TISSUE EXAM BY PATHOLOGIST: CPT | Performed by: PATHOLOGY

## 2023-10-27 PROCEDURE — 45338 SIGMOIDOSCOPY W/TUMR REMOVE: CPT | Performed by: STUDENT IN AN ORGANIZED HEALTH CARE EDUCATION/TRAINING PROGRAM

## 2023-10-27 PROCEDURE — 45331 SIGMOIDOSCOPY AND BIOPSY: CPT | Performed by: STUDENT IN AN ORGANIZED HEALTH CARE EDUCATION/TRAINING PROGRAM

## 2023-10-27 NOTE — INTERVAL H&P NOTE
H&P reviewed. After examining the patient I find no changes in the patients condition since the H&P had been written. Patient declines EGD. He also did not fast (ate food today) and did not prep for colonoscopy. He perseverates on having a rectal mass that he can feel and would like a camera to "look 2 inches into his rectum" while he is not sedated. I explained that this would likely be limited since we typically at least do enemas to prepare for a flexible sigmoidoscopy. I would likely not be able to adequately evaluate the sigmoid and descending colon where there was wall thickening on prior CT. Furthermore, he is tremulous from Parkinson's, so this will also limit the exam while he is unsedated. Patient understands and would like to proceed with flex sig only.     Vitals:    10/27/23 1136   BP: 155/97   Pulse: 86   Resp: 20   Temp: 97.5 °F (36.4 °C)   SpO2: 98%

## 2023-11-04 PROCEDURE — 88305 TISSUE EXAM BY PATHOLOGIST: CPT | Performed by: PATHOLOGY

## 2023-11-08 ENCOUNTER — HOSPITAL ENCOUNTER (EMERGENCY)
Facility: HOSPITAL | Age: 33
Discharge: HOME | End: 2023-11-08
Attending: EMERGENCY MEDICINE | Admitting: EMERGENCY MEDICINE
Payer: COMMERCIAL

## 2023-11-08 VITALS
WEIGHT: 148.59 LBS | SYSTOLIC BLOOD PRESSURE: 125 MMHG | HEART RATE: 93 BPM | TEMPERATURE: 97.9 F | BODY MASS INDEX: 23.27 KG/M2 | OXYGEN SATURATION: 96 % | RESPIRATION RATE: 17 BRPM | DIASTOLIC BLOOD PRESSURE: 56 MMHG

## 2023-11-08 DIAGNOSIS — K08.89 PAIN, DENTAL: Primary | ICD-10-CM

## 2023-11-08 DIAGNOSIS — S02.5XXA BROKEN TOOTH: ICD-10-CM

## 2023-11-08 PROCEDURE — 99282 EMERGENCY DEPT VISIT SF MDM: CPT

## 2023-11-08 PROCEDURE — 99284 EMERGENCY DEPT VISIT MOD MDM: CPT | Performed by: PHYSICIAN ASSISTANT

## 2023-11-08 RX ORDER — AMOXICILLIN 500 MG/1
500 CAPSULE ORAL EVERY 12 HOURS SCHEDULED
Qty: 20 CAPSULE | Refills: 0 | Status: SHIPPED | OUTPATIENT
Start: 2023-11-08 | End: 2023-11-18

## 2023-11-08 RX ORDER — HYDROCODONE BITARTRATE AND ACETAMINOPHEN 5; 325 MG/1; MG/1
1 TABLET ORAL EVERY 6 HOURS PRN
Qty: 8 TABLET | Refills: 0 | Status: SHIPPED | OUTPATIENT
Start: 2023-11-08
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
## 2024-01-10 ENCOUNTER — NURSE TRIAGE (OUTPATIENT)
Age: 34
End: 2024-01-10

## 2024-01-10 NOTE — TELEPHONE ENCOUNTER
Regarding: patient stated he has severe constipation, gas pain  ----- Message from Rosina Villalobos sent at 1/10/2024 12:54 PM EST -----  Patients GI provider:  Dr. Flower    Number to return call: [phone]    Reason for call: Pt calling stating he can't barely eat, going through so much and suffering from gas pain    Scheduled procedure/appointment date if applicable: Apt/procedure

## 2024-01-10 NOTE — TELEPHONE ENCOUNTER
"Last ov 10/16/23 Dr. Flower, Procedure flex sig 10/27/23, Labs 7/5/23 and H Pylori stool 9/7/23, Imaging UGI sm bowel 10/23/23, CT A/P 7/5/23 Scheduled for visit 1/11/24 to address symptoms.    FYI:  Patient notes issues with constipation, gas, satiety, He has been taking Senna up to 3-4 times a day along with suppository to pass stool. He states BM is soft, no blood. He is complaining of difficulty swallowing liquids at this point. Patient does have hx of Parkinson's. Patient advised to keep hydrated and I moved his follow up appointment to tomorrow vs originally scheduled in March.    Reason for Disposition   Unable to have a bowel movement (BM) without using a laxative, suppository, or enema    Answer Assessment - Initial Assessment Questions  1. STOOL PATTERN OR FREQUENCY: \"How often do you pass bowel movements (BMs)?\"  (Normal range: tid to q 3 days)  \"When was the last BM passed?\"        Regularly but has to use Senna and suppository  2. STRAINING: \"Do you have to strain to have a BM?\"       yes  3. STOOL COMPOSITION: \"Are the stools hard?\"       Soft stool  5. BLOOD ON STOOLS: \"Has there been any blood on the toilet tissue or on the surface of the BM?\" If Yes, ask: \"When was the last time?\"       no  6. CHRONIC CONSTIPATION: \"Is this a new problem for you?\"  If no, ask: \"How long have you had this problem?\" (days, weeks, months)       History of constipation  7. CHANGES IN DIET OR HYDRATION: \"Have there been any recent changes in your diet?\" \"How much fluids are you drinking consuming on a daily basis?\"  \"How much have you had to drink today?\"      Lack of appetite, states swallowing difficulties even with fluids  8. MEDICATIONS: \"Have you been taking any new medications?\" \"Are you taking any narcotic pain medications?\" (e.g., Vicoden, Percocet, morphine, dilaudid)      no  9. LAXATIVES: \"Have you been using any stool softeners, laxatives, or enemas?\"  If yes, ask \"What, how often, and when was the last " "time?\"  Takes Senna at times 3-4 times a day and suppository  10.ACTIVITY:  \"How much walking do you do every day? on a daily basis?\"  \"Has your activity level decreased in the past week?\"         Hx of Parkinson's  11. CAUSE: \"What do you think is causing the constipation?\"         chronic  12. OTHER SYMPTOMS: \"Do you have any other symptoms?\" (e.g., abdominal pain, bloating, fever, vomiting)        Gas pain    Protocols used: Constipation-ADULT-OH    "

## 2024-01-11 ENCOUNTER — OFFICE VISIT (OUTPATIENT)
Dept: GASTROENTEROLOGY | Facility: MEDICAL CENTER | Age: 34
End: 2024-01-11
Payer: COMMERCIAL

## 2024-01-11 VITALS
WEIGHT: 148.8 LBS | OXYGEN SATURATION: 99 % | DIASTOLIC BLOOD PRESSURE: 64 MMHG | HEART RATE: 112 BPM | SYSTOLIC BLOOD PRESSURE: 118 MMHG | TEMPERATURE: 99.2 F | HEIGHT: 67 IN | BODY MASS INDEX: 23.35 KG/M2

## 2024-01-11 DIAGNOSIS — R93.5 ABNORMAL CT OF THE ABDOMEN: Primary | ICD-10-CM

## 2024-01-11 DIAGNOSIS — K63.5 POLYP OF COLON, UNSPECIFIED PART OF COLON, UNSPECIFIED TYPE: ICD-10-CM

## 2024-01-11 DIAGNOSIS — K59.00 CONSTIPATION, UNSPECIFIED CONSTIPATION TYPE: ICD-10-CM

## 2024-01-11 PROCEDURE — 99214 OFFICE O/P EST MOD 30 MIN: CPT | Performed by: NURSE PRACTITIONER
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
## 2024-02-19 ENCOUNTER — TELEPHONE (OUTPATIENT)
Dept: GASTROENTEROLOGY | Facility: CLINIC | Age: 34
End: 2024-02-19

## 2024-02-19 NOTE — TELEPHONE ENCOUNTER
Confirming Upcoming Procedure: EGD/Colonoscopy on 02/29/2024  Physician performing: Dr. Flower  Location of procedure:    Prep: N/A

## 2024-02-27 ENCOUNTER — TELEPHONE (OUTPATIENT)
Dept: GASTROENTEROLOGY | Facility: HOSPITAL | Age: 34
End: 2024-02-27

## 2024-02-29 ENCOUNTER — HOSPITAL ENCOUNTER (OUTPATIENT)
Dept: GASTROENTEROLOGY | Facility: HOSPITAL | Age: 34
Setting detail: OUTPATIENT SURGERY
End: 2024-02-29
Payer: COMMERCIAL

## 2024-02-29 ENCOUNTER — ANESTHESIA (OUTPATIENT)
Dept: GASTROENTEROLOGY | Facility: HOSPITAL | Age: 34
End: 2024-02-29

## 2024-02-29 ENCOUNTER — ANESTHESIA EVENT (OUTPATIENT)
Dept: GASTROENTEROLOGY | Facility: HOSPITAL | Age: 34
End: 2024-02-29

## 2024-02-29 VITALS
DIASTOLIC BLOOD PRESSURE: 70 MMHG | RESPIRATION RATE: 16 BRPM | HEART RATE: 67 BPM | OXYGEN SATURATION: 100 % | SYSTOLIC BLOOD PRESSURE: 120 MMHG | TEMPERATURE: 97.9 F

## 2024-02-29 DIAGNOSIS — K29.80 DUODENITIS: ICD-10-CM

## 2024-02-29 DIAGNOSIS — R93.5 ABNORMAL CT OF THE ABDOMEN: ICD-10-CM

## 2024-02-29 DIAGNOSIS — K29.80 DUODENITIS: Primary | ICD-10-CM

## 2024-02-29 PROCEDURE — 88305 TISSUE EXAM BY PATHOLOGIST: CPT | Performed by: PATHOLOGY

## 2024-02-29 RX ORDER — PROPOFOL 10 MG/ML
INJECTION, EMULSION INTRAVENOUS AS NEEDED
Status: DISCONTINUED | OUTPATIENT
Start: 2024-02-29 | End: 2024-02-29

## 2024-02-29 RX ORDER — SODIUM CHLORIDE, SODIUM LACTATE, POTASSIUM CHLORIDE, CALCIUM CHLORIDE 600; 310; 30; 20 MG/100ML; MG/100ML; MG/100ML; MG/100ML
125 INJECTION, SOLUTION INTRAVENOUS CONTINUOUS
Status: DISPENSED | OUTPATIENT
Start: 2024-02-29

## 2024-02-29 RX ORDER — OMEPRAZOLE 40 MG/1
40 CAPSULE, DELAYED RELEASE ORAL DAILY
Qty: 30 CAPSULE | Refills: 11 | Status: SHIPPED | OUTPATIENT
Start: 2024-02-29 | End: 2024-02-29

## 2024-02-29 RX ORDER — OMEPRAZOLE 40 MG/1
40 CAPSULE, DELAYED RELEASE ORAL DAILY
Qty: 90 CAPSULE | Refills: 1 | Status: SHIPPED | OUTPATIENT
Start: 2024-02-29

## 2024-02-29 RX ORDER — LIDOCAINE HYDROCHLORIDE 10 MG/ML
INJECTION, SOLUTION EPIDURAL; INFILTRATION; INTRACAUDAL; PERINEURAL AS NEEDED
Status: DISCONTINUED | OUTPATIENT
Start: 2024-02-29 | End: 2024-02-29

## 2024-02-29 RX ADMIN — PROPOFOL 50 MG: 10 INJECTION, EMULSION INTRAVENOUS at 08:59

## 2024-02-29 RX ADMIN — PROPOFOL 50 MG: 10 INJECTION, EMULSION INTRAVENOUS at 08:42

## 2024-02-29 RX ADMIN — SODIUM CHLORIDE, SODIUM LACTATE, POTASSIUM CHLORIDE, AND CALCIUM CHLORIDE 125 ML/HR: .6; .31; .03; .02 INJECTION, SOLUTION INTRAVENOUS at 08:21

## 2024-02-29 RX ADMIN — SODIUM CHLORIDE, SODIUM LACTATE, POTASSIUM CHLORIDE, AND CALCIUM CHLORIDE: .6; .31; .03; .02 INJECTION, SOLUTION INTRAVENOUS at 08:27

## 2024-02-29 RX ADMIN — PROPOFOL 50 MG: 10 INJECTION, EMULSION INTRAVENOUS at 08:44

## 2024-02-29 RX ADMIN — PROPOFOL 150 MCG/KG/MIN: 10 INJECTION, EMULSION INTRAVENOUS at 09:01

## 2024-02-29 RX ADMIN — PROPOFOL 50 MG: 10 INJECTION, EMULSION INTRAVENOUS at 08:50

## 2024-02-29 RX ADMIN — PROPOFOL 50 MG: 10 INJECTION, EMULSION INTRAVENOUS at 08:35

## 2024-02-29 RX ADMIN — PROPOFOL 100 MG: 10 INJECTION, EMULSION INTRAVENOUS at 08:32

## 2024-02-29 RX ADMIN — LIDOCAINE HYDROCHLORIDE 50 MG: 10 INJECTION, SOLUTION EPIDURAL; INFILTRATION; INTRACAUDAL; PERINEURAL at 08:32

## 2024-02-29 RX ADMIN — PROPOFOL 50 MG: 10 INJECTION, EMULSION INTRAVENOUS at 08:37

## 2024-02-29 RX ADMIN — PROPOFOL 50 MG: 10 INJECTION, EMULSION INTRAVENOUS at 08:53

## 2024-02-29 RX ADMIN — PROPOFOL 50 MG: 10 INJECTION, EMULSION INTRAVENOUS at 08:56

## 2024-02-29 RX ADMIN — PROPOFOL 50 MG: 10 INJECTION, EMULSION INTRAVENOUS at 08:46

## 2024-02-29 RX ADMIN — PROPOFOL 50 MG: 10 INJECTION, EMULSION INTRAVENOUS at 08:33

## 2024-02-29 RX ADMIN — PROPOFOL 50 MG: 10 INJECTION, EMULSION INTRAVENOUS at 08:39

## 2024-02-29 RX ADMIN — PROPOFOL 50 MG: 10 INJECTION, EMULSION INTRAVENOUS at 08:48
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
## 2024-02-29 NOTE — ANESTHESIA PREPROCEDURE EVALUATION
Procedure:  COLONOSCOPY  EGD    Relevant Problems   CARDIO   (+) Essential hypertension   (+) Intractable chronic migraine without aura      NEURO/PSYCH   (+) Chronic left shoulder pain   (+) Chronic right shoulder pain   (+) Intractable chronic migraine without aura      Nervous and Auditory   (+) Carpal tunnel syndrome   (+) Parkinson's syndrome   (+) Postconcussion syndrome      Musculoskeletal and Integument   (+) Labral tear of shoulder, left, subsequent encounter   (+) Mallet finger of right hand   (+) Tendinopathy of right biceps tendon      Other   (+) Ambulatory dysfunction   (+) Chronic right hip pain   (+) Human papilloma virus (HPV) infection   (+) Right leg pain   (+) Status post labral repair of shoulder   (+) Tremor of left hand        Physical Exam    Airway    Mallampati score: II  TM Distance: <3 FB  Neck ROM: full     Dental        Cardiovascular  Cardiovascular exam normal    Pulmonary  Pulmonary exam normal     Other Findings        Anesthesia Plan  ASA Score- 2     Anesthesia Type- IV sedation with anesthesia with ASA Monitors.         Additional Monitors:     Airway Plan:            Plan Factors-Exercise tolerance (METS): <4 METS.    Chart reviewed.   Existing labs reviewed.     Patient is not a current smoker. Patient not instructed to abstain from smoking on day of procedure. Patient did not smoke on day of surgery.            Induction- intravenous.    Postoperative Plan-     Informed Consent- Anesthetic plan and risks discussed with patient.  I personally reviewed this patient with the CRNA. Discussed and agreed on the Anesthesia Plan with the CRNA..

## 2024-02-29 NOTE — ANESTHESIA POSTPROCEDURE EVALUATION
Post-Op Assessment Note    CV Status:  Stable  Pain Score: 0    Pain management: adequate       Mental Status:  Alert and awake   Hydration Status:  Euvolemic   PONV Controlled:  Controlled   Airway Patency:  Patent     Post Op Vitals Reviewed: Yes    No anethesia notable event occurred.    Staff: Anesthesiologist, CRNA           BP   94/60   Temp   98.9   Pulse  62   Resp   14   SpO2   98

## 2024-03-04 ENCOUNTER — NURSE TRIAGE (OUTPATIENT)
Age: 34
End: 2024-03-04

## 2024-03-04 ENCOUNTER — TELEPHONE (OUTPATIENT)
Dept: GASTROENTEROLOGY | Facility: MEDICAL CENTER | Age: 34
End: 2024-03-04

## 2024-03-04 DIAGNOSIS — A04.8 H. PYLORI INFECTION: Primary | ICD-10-CM

## 2024-03-04 PROCEDURE — 88305 TISSUE EXAM BY PATHOLOGIST: CPT | Performed by: PATHOLOGY

## 2024-03-04 RX ORDER — CLARITHROMYCIN 500 MG/1
500 TABLET, COATED ORAL EVERY 12 HOURS SCHEDULED
Qty: 28 TABLET | Refills: 0 | Status: SHIPPED | OUTPATIENT
Start: 2024-03-04 | End: 2024-03-18

## 2024-03-04 RX ORDER — AMOXICILLIN 500 MG/1
1000 CAPSULE ORAL EVERY 12 HOURS SCHEDULED
Qty: 56 CAPSULE | Refills: 0 | Status: SHIPPED | OUTPATIENT
Start: 2024-03-04 | End: 2024-03-18

## 2024-03-04 RX ORDER — OMEPRAZOLE 40 MG/1
40 CAPSULE, DELAYED RELEASE ORAL 2 TIMES DAILY
Qty: 28 CAPSULE | Refills: 0 | Status: SHIPPED | OUTPATIENT
Start: 2024-03-04 | End: 2024-03-18

## 2024-03-04 NOTE — TELEPHONE ENCOUNTER
Please let patient know that the biopsies of his stomach show H. pylori infection.  I have sent triple therapy to his pharmacy and would like him to take this for 14 days.  1 month after after completing treatment, I would like him to complete stool antigen testing off PPI.    1. H. pylori infection  - amoxicillin (AMOXIL) 500 mg capsule; Take 2 capsules (1,000 mg total) by mouth every 12 (twelve) hours for 14 days  Dispense: 56 capsule; Refill: 0  - clarithromycin (BIAXIN) 500 mg tablet; Take 1 tablet (500 mg total) by mouth every 12 (twelve) hours for 14 days  Dispense: 28 tablet; Refill: 0  - H. pylori antigen, stool; Future  - omeprazole (PriLOSEC) 40 MG capsule; Take 1 capsule (40 mg total) by mouth 2 (two) times a day for 14 days  Dispense: 28 capsule; Refill: 0

## 2024-03-04 NOTE — TELEPHONE ENCOUNTER
Patient returning call regarding results and medication recommendation. Patient was transferred over to the nurse triage line and Leti took over the call.

## 2024-05-29 ENCOUNTER — OFFICE VISIT (OUTPATIENT)
Dept: GASTROENTEROLOGY | Facility: MEDICAL CENTER | Age: 34
End: 2024-05-29
Payer: COMMERCIAL

## 2024-05-29 VITALS
SYSTOLIC BLOOD PRESSURE: 132 MMHG | BODY MASS INDEX: 22.76 KG/M2 | DIASTOLIC BLOOD PRESSURE: 84 MMHG | HEART RATE: 96 BPM | TEMPERATURE: 98 F | HEIGHT: 67 IN | WEIGHT: 145 LBS

## 2024-05-29 DIAGNOSIS — K59.00 DIFFICULTY PASSING STOOL: ICD-10-CM

## 2024-05-29 DIAGNOSIS — A04.8 H. PYLORI INFECTION: Primary | ICD-10-CM

## 2024-05-29 PROCEDURE — 99213 OFFICE O/P EST LOW 20 MIN: CPT | Performed by: STUDENT IN AN ORGANIZED HEALTH CARE EDUCATION/TRAINING PROGRAM
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
## 2024-06-07 ENCOUNTER — APPOINTMENT (OUTPATIENT)
Dept: LAB | Facility: HOSPITAL | Age: 34
End: 2024-06-07
Payer: COMMERCIAL

## 2024-06-07 DIAGNOSIS — A04.8 H. PYLORI INFECTION: ICD-10-CM

## 2024-06-07 PROCEDURE — 83013 H PYLORI (C-13) BREATH: CPT

## 2024-06-07 PROCEDURE — 83014 H PYLORI DRUG ADMIN: CPT

## 2024-06-08 LAB — UREA BREATH TEST QL: NEGATIVE

## 2024-06-12 ENCOUNTER — OFFICE VISIT (OUTPATIENT)
Dept: DENTISTRY | Facility: CLINIC | Age: 34
End: 2024-06-12

## 2024-06-12 VITALS — TEMPERATURE: 98 F | SYSTOLIC BLOOD PRESSURE: 142 MMHG | HEART RATE: 71 BPM | DIASTOLIC BLOOD PRESSURE: 86 MMHG

## 2024-06-12 DIAGNOSIS — K04.5 SYMPTOMATIC PERIAPICAL PERIODONTITIS: ICD-10-CM

## 2024-06-12 DIAGNOSIS — K02.9 COMPLEX DENTAL CARIES: Primary | ICD-10-CM

## 2024-06-12 PROCEDURE — D0330 PANORAMIC RADIOGRAPHIC IMAGE: HCPCS | Performed by: DENTIST

## 2024-06-12 PROCEDURE — D0140 LIMITED ORAL EVALUATION - PROBLEM FOCUSED: HCPCS | Performed by: DENTIST

## 2024-06-12 NOTE — DENTAL PROCEDURE DETAILS
"Dental Emergency Limited Exam    Hemanth Burt 34 y.o. male presents with self to [facility] for Limited exam  PMH reviewed, no changes, ASA III. Significant medical history: see list. Significant allergies: see list. Significant medications: see list.  Pain scale: 5  Chief complaint: \"Pain of my teeth on lower right and left back teeth\".   HPI: patient reported he started feeling the teeth discomfort sine his last visit over a year ago. Patient reported he was waiting a year to get appointment with [facility] and they told him a new referral is needed.   Dental History: patient was seen for limited exam by Dr. Davis on 5/2/2023. Proposed treatment plan and referred to [facility] for extractions of teeth #17,30,31,32. Also was noted,  after extractions, will re eval TX plan and determine if patient can be treated here or if he needs to be seen in OR. See note and chart.  Consent:  Discussed that limited exam focuses on problem area, and same day tx is not guaranteed.  Patient explained to if they wish to have anything else evaluated, they need to return to the practice at which they are a patient of record or receive a comprehensive exam.  Patient understands and consents.    Subjective history:    Onset: over a year   Provocation: Biting, Chewing, and Spontaneous   Quality: Sharp and Throbbing   Region: LL and LR   Severity: 5/10   Timing: constant    Objective clinical findings:   Oral cancer screening: No abnormalities detected   Extraoral exam:   - Facial Swelling: No. Lymphadenopathy: No.  TMD symptoms: No.   Intraoral limited exam/Assessment:  1- Tooth #30: extensive caries/broken/retained root stumps with PARL. Non restorable. Poor prognosis. Chief complain of patient today. Was referred to [facility] on 5/2/2023 for extraction by other provider.   2- Tooth #31: extensive deep caries. Non restorable. Poor prognosis. Asymptomatic. Was referred to [facility] on 5/2/2023 for extraction by other provider.   3-. Impacted teeth #17 and #32. " Asymptomatic. Was referred to [facility] on 5/2/2023 for extraction by other provider.   4- Impacted tooth #16. Asymptomatic.  5- Tooth #19 deep caries with pulp involvement. Palpation (-), percussion (++), endo ice test is not responding. DX: pulp necrosis/symptomatic periapical periodontitis. Could be restored by RCT/BU/[place] restoration.     Radiographs: PAN               Plan: Discussed today's findings as well as past recommendations by other provider. Discussed need for dental treatment in [facility] or [facility] due to current medical condition of Parkinson's disease with spastic movement. Patient aware and understood. Patient requested his referral to [facility] to include the broken tooth #30 only for extraction because he is not willing to get extractions for #31 and impacted teeth #16,17,32 for now. Patient interested to save tooth #19 and referred to endo for RCT. Informed patient other periodontal and  restorative care will be considered in OR or [facility]. Will communicate with  and administration personnel to mange and arrange accordingly. Group email is sent including (Dr. Davis, Dr. Rodriguez, Radha, Zuleyka Penaloza Deisy, Milly).     Referrals:  1- [facility] extraction tooth #30.  2- Endo for RCT tooth #19.  3- [facility] for COM, periodontal  and restorative care.     Comprehensive care disposition:  Patient of records.     Rx: No.     Patient dismissed ambulatory and alert.    NV: [facility] for COMP, periodontal and restorative care.

## 2024-11-26 ENCOUNTER — OFFICE VISIT (OUTPATIENT)
Dept: GASTROENTEROLOGY | Facility: MEDICAL CENTER | Age: 34
End: 2024-11-26
Payer: COMMERCIAL

## 2024-11-26 VITALS
BODY MASS INDEX: 22.76 KG/M2 | TEMPERATURE: 98.2 F | WEIGHT: 145 LBS | SYSTOLIC BLOOD PRESSURE: 126 MMHG | OXYGEN SATURATION: 98 % | DIASTOLIC BLOOD PRESSURE: 70 MMHG | HEART RATE: 64 BPM | HEIGHT: 67 IN

## 2024-11-26 DIAGNOSIS — K59.00 DIFFICULTY PASSING STOOL: ICD-10-CM

## 2024-11-26 DIAGNOSIS — R11.0 NAUSEA: ICD-10-CM

## 2024-11-26 DIAGNOSIS — R10.12 LUQ ABDOMINAL PAIN: Primary | ICD-10-CM

## 2024-11-26 PROCEDURE — 99214 OFFICE O/P EST MOD 30 MIN: CPT | Performed by: STUDENT IN AN ORGANIZED HEALTH CARE EDUCATION/TRAINING PROGRAM

## 2024-11-26 RX ORDER — ONDANSETRON 4 MG/1
4 TABLET, FILM COATED ORAL DAILY PRN
Qty: 20 TABLET | Refills: 0 | Status: SHIPPED | OUTPATIENT
Start: 2024-11-26

## 2024-11-26 NOTE — PROGRESS NOTES
Name: Hemanth Burt      : 1990      MRN: [id number]  Encounter Provider: Doreen Flower MD  Encounter Date: 2024   Encounter department: [facility]  :  Assessment & Plan  LUQ abdominal pain  Continues to suffer from chronic abdominal discomfort and difficulty passing gas and stool for years.  Colonoscopy 2024 had poor prep but no evidence of tumor or colitis.  EGD 2024 with H. pylori gastritis.  He was treated with triple therapy and breath testing after completion of treatment was negative.  Suspect his symptoms are related to defactory dysfunction and likely autonomic abnormalities given his early onset Parkinson's.  He is convinced there is an underlying GI process and after online research, is convinced that his symptoms represent gastroparesis.  Will obtain gastric emptying study though we discussed this would not fully explain his symptoms and if he has delayed gastric emptying management of this will be largely diet dependent.  Orders:    NM gastric emptying; Future    ondansetron (ZOFRAN) 4 mg tablet; Take 1 tablet (4 mg total) by mouth daily as needed for nausea or vomiting    Difficulty passing stool  Baseline bowel habits are unclear as patient uses stimulant laxative with every meal.  Not interested in pelvic floor physical therapy.       Nausea  Extensively counseled on side effects of Zofran.  Will trial low-dose Zofran as he is convinced this will resolve all of his symptoms.  Orders:    ondansetron (ZOFRAN) 4 mg tablet; Take 1 tablet (4 mg total) by mouth daily as needed for nausea or vomiting        History of Present Illness     HPI  Hemanth Burt is a 34 y.o. male who presents for follow-up of abdominal discomfort and difficulty defecating.    Still having same symptoms.  Feels like nothing empties from his left side.  Has to lay down and massage side to get food and stool to move along.  Having to take carbidopa-levodopa     Feels full  "and stiff about 30 minutes after eating.    Not having any acid reflux. Not taking omeprazole, doeesn't feel like it helped. Has nausea every day.    Having complete bowel movement in AM and then maybe a second later in the day. Using suppositories to help reduce gas.  Taking 2-3 tablets of Senna per day.        Review of Systems   Constitutional:  Negative for chills and fever.   HENT:  Negative for ear pain and sore throat.    Eyes:  Negative for pain and visual disturbance.   Respiratory:  Negative for cough and shortness of breath.    Cardiovascular:  Negative for chest pain and palpitations.   Gastrointestinal:  Positive for abdominal distention, abdominal pain, constipation and nausea. Negative for vomiting.   Genitourinary:  Negative for dysuria and hematuria.   Musculoskeletal:  Positive for joint swelling. Negative for arthralgias and back pain.   Skin:  Negative for color change and rash.   Neurological:  Positive for tremors and speech difficulty. Negative for seizures and syncope.   All other systems reviewed and are negative.         Objective   /70 (BP Location: Left arm)   Pulse 64   Temp 98.2 °F (36.8 °C)   Ht 5' 7\" (1.702 m)   Wt 65.8 kg (145 lb)   SpO2 98%   BMI 22.71 kg/m²      Physical Exam  Vitals and nursing note reviewed.   Constitutional:       General: He is not in acute distress.     Appearance: He is well-developed.   HENT:      Head: Normocephalic and atraumatic.   Eyes:      Conjunctiva/sclera: Conjunctivae normal.   Cardiovascular:      Rate and Rhythm: Normal rate and regular rhythm.      Heart sounds: No murmur heard.  Pulmonary:      Effort: Pulmonary effort is normal. No respiratory distress.      Breath sounds: Normal breath sounds.   Abdominal:      Palpations: Abdomen is soft.      Tenderness: There is no abdominal tenderness.   Musculoskeletal:         General: No swelling.      Cervical back: Neck supple.   Skin:     General: Skin is warm and dry.      Capillary " Refill: Capillary refill takes less than 2 seconds.   Neurological:      Mental Status: He is alert.      Comments: Frequent involuntary movements. Increased tone/spasticity   Psychiatric:         Mood and Affect: Mood normal.

## 2024-12-12 ENCOUNTER — APPOINTMENT (OUTPATIENT)
Dept: LAB | Facility: HOSPITAL | Age: 34
End: 2024-12-12
Payer: COMMERCIAL

## 2024-12-12 DIAGNOSIS — F33.1 MAJOR DEPRESSIVE DISORDER, RECURRENT EPISODE, MODERATE (HCC): ICD-10-CM

## 2024-12-12 LAB
ALBUMIN SERPL BCG-MCNC: 4.5 G/DL (ref 3.5–5)
ALP SERPL-CCNC: 36 U/L (ref 34–104)
ALT SERPL W P-5'-P-CCNC: 5 U/L (ref 7–52)
ANION GAP SERPL CALCULATED.3IONS-SCNC: 8 MMOL/L (ref 4–13)
AST SERPL W P-5'-P-CCNC: 19 U/L (ref 13–39)
BASOPHILS # BLD AUTO: 0.06 THOUSANDS/ÂΜL (ref 0–0.1)
BASOPHILS NFR BLD AUTO: 2 % (ref 0–1)
BILIRUB SERPL-MCNC: 0.54 MG/DL (ref 0.2–1)
BUN SERPL-MCNC: 14 MG/DL (ref 5–25)
CALCIUM SERPL-MCNC: 8.7 MG/DL (ref 8.4–10.2)
CHLORIDE SERPL-SCNC: 102 MMOL/L (ref 96–108)
CHOLEST SERPL-MCNC: 161 MG/DL (ref ?–200)
CO2 SERPL-SCNC: 30 MMOL/L (ref 21–32)
CREAT SERPL-MCNC: 0.82 MG/DL (ref 0.6–1.3)
EOSINOPHIL # BLD AUTO: 0.33 THOUSAND/ÂΜL (ref 0–0.61)
EOSINOPHIL NFR BLD AUTO: 9 % (ref 0–6)
ERYTHROCYTE [DISTWIDTH] IN BLOOD BY AUTOMATED COUNT: 11.5 % (ref 11.6–15.1)
GFR SERPL CREATININE-BSD FRML MDRD: 114 ML/MIN/1.73SQ M
GLUCOSE P FAST SERPL-MCNC: 89 MG/DL (ref 65–99)
HCT VFR BLD AUTO: 42.4 % (ref 36.5–49.3)
HDLC SERPL-MCNC: 59 MG/DL
HGB BLD-MCNC: 14.7 G/DL (ref 12–17)
IMM GRANULOCYTES # BLD AUTO: 0.01 THOUSAND/UL (ref 0–0.2)
IMM GRANULOCYTES NFR BLD AUTO: 0 % (ref 0–2)
LDLC SERPL CALC-MCNC: 90 MG/DL (ref 0–100)
LYMPHOCYTES # BLD AUTO: 1.29 THOUSANDS/ÂΜL (ref 0.6–4.47)
LYMPHOCYTES NFR BLD AUTO: 33 % (ref 14–44)
MCH RBC QN AUTO: 32.5 PG (ref 26.8–34.3)
MCHC RBC AUTO-ENTMCNC: 34.7 G/DL (ref 31.4–37.4)
MCV RBC AUTO: 94 FL (ref 82–98)
MONOCYTES # BLD AUTO: 0.32 THOUSAND/ÂΜL (ref 0.17–1.22)
MONOCYTES NFR BLD AUTO: 8 % (ref 4–12)
NEUTROPHILS # BLD AUTO: 1.88 THOUSANDS/ÂΜL (ref 1.85–7.62)
NEUTS SEG NFR BLD AUTO: 48 % (ref 43–75)
NONHDLC SERPL-MCNC: 102 MG/DL
NRBC BLD AUTO-RTO: 0 /100 WBCS
PLATELET # BLD AUTO: 287 THOUSANDS/UL (ref 149–390)
PMV BLD AUTO: 8.8 FL (ref 8.9–12.7)
POTASSIUM SERPL-SCNC: 4.1 MMOL/L (ref 3.5–5.3)
PROT SERPL-MCNC: 6.9 G/DL (ref 6.4–8.4)
RBC # BLD AUTO: 4.52 MILLION/UL (ref 3.88–5.62)
SODIUM SERPL-SCNC: 140 MMOL/L (ref 135–147)
TRIGL SERPL-MCNC: 59 MG/DL (ref ?–150)
TSH SERPL DL<=0.05 MIU/L-ACNC: 1.16 UIU/ML (ref 0.45–4.5)
WBC # BLD AUTO: 3.89 THOUSAND/UL (ref 4.31–10.16)

## 2024-12-12 PROCEDURE — 80053 COMPREHEN METABOLIC PANEL: CPT

## 2024-12-12 PROCEDURE — 80349 CANNABINOIDS NATURAL: CPT

## 2024-12-12 PROCEDURE — 85025 COMPLETE CBC W/AUTO DIFF WBC: CPT

## 2024-12-12 PROCEDURE — 36415 COLL VENOUS BLD VENIPUNCTURE: CPT

## 2024-12-12 PROCEDURE — 80307 DRUG TEST PRSMV CHEM ANLYZR: CPT

## 2024-12-12 PROCEDURE — 80061 LIPID PANEL: CPT

## 2024-12-12 PROCEDURE — 84443 ASSAY THYROID STIM HORMONE: CPT

## 2024-12-17 LAB
6MAM UR QL SCN: NEGATIVE NG/ML
ACCEPTABLE CREAT UR QL: 190 MG/DL
AMPHET UR QL SCN: NEGATIVE NG/ML
BARBITURATES UR QL SCN: NEGATIVE NG/ML
BENZODIAZ UR QL SCN: NEGATIVE NG/ML
BUPRENORPHINE UR QL CFM: NEGATIVE NG/ML
CANNABINOIDS SERPLBLD QL SCN: ABNORMAL
CANNABINOIDS UR QL SCN: NORMAL NG/ML
CANNABINOIDS/CREAT UR: 47 NG/MG CREAT
CARISOPRODOL UR QL: NEGATIVE NG/ML
COCAINE+BZE UR QL SCN: NEGATIVE NG/ML
ETHYL GLUCURONIDE UR QL SCN: NEGATIVE NG/ML
FENTANYL UR QL SCN: NEGATIVE NG/ML
GABAPENTIN SERPLBLD QL SCN: NEGATIVE UG/ML
METHADONE UR QL SCN: NEGATIVE NG/ML
NITRITE UR QL STRIP: NEGATIVE UG/ML
OPIATES UR QL SCN: NEGATIVE NG/ML
OXYCODONE+OXYMORPHONE UR QL SCN: NEGATIVE NG/ML
PCP UR QL SCN: NEGATIVE NG/ML
PROPOXYPH UR QL SCN: NEGATIVE NG/ML
SPECIMEN PH ACCEPTABLE UR: 5.4 (ref 4.5–8.9)
TAPENTADOL UR QL SCN: NEGATIVE NG/ML
TRAMADOL UR QL SCN: NEGATIVE NG/ML

## 2024-12-24 ENCOUNTER — OFFICE VISIT (OUTPATIENT)
Dept: FAMILY MEDICINE CLINIC | Facility: CLINIC | Age: 34
End: 2024-12-24

## 2024-12-24 VITALS
WEIGHT: 144 LBS | RESPIRATION RATE: 16 BRPM | DIASTOLIC BLOOD PRESSURE: 72 MMHG | OXYGEN SATURATION: 97 % | TEMPERATURE: 97.5 F | HEIGHT: 67 IN | BODY MASS INDEX: 22.6 KG/M2 | HEART RATE: 67 BPM | SYSTOLIC BLOOD PRESSURE: 116 MMHG

## 2024-12-24 DIAGNOSIS — K59.00 CONSTIPATION, UNSPECIFIED CONSTIPATION TYPE: ICD-10-CM

## 2024-12-24 DIAGNOSIS — G20.B1 PARKINSON'S DISEASE WITH DYSKINESIA, UNSPECIFIED WHETHER MANIFESTATIONS FLUCTUATE (HCC): ICD-10-CM

## 2024-12-24 DIAGNOSIS — F32.2 MODERATELY SEVERE MAJOR DEPRESSION (HCC): ICD-10-CM

## 2024-12-24 DIAGNOSIS — R68.81 EARLY SATIETY: ICD-10-CM

## 2024-12-24 DIAGNOSIS — R14.0 ABDOMINAL BLOATING: ICD-10-CM

## 2024-12-24 DIAGNOSIS — E63.9 POOR NUTRITION: ICD-10-CM

## 2024-12-24 DIAGNOSIS — Z11.4 SCREENING FOR HIV (HUMAN IMMUNODEFICIENCY VIRUS): ICD-10-CM

## 2024-12-24 DIAGNOSIS — Z11.59 NEED FOR HEPATITIS C SCREENING TEST: ICD-10-CM

## 2024-12-24 DIAGNOSIS — R10.12 LEFT UPPER QUADRANT ABDOMINAL PAIN: Primary | ICD-10-CM

## 2024-12-24 DIAGNOSIS — D72.819 LEUKOPENIA, UNSPECIFIED TYPE: ICD-10-CM

## 2024-12-24 PROBLEM — R10.9 ABDOMINAL PAIN: Status: ACTIVE | Noted: 2024-12-24

## 2024-12-24 PROCEDURE — 3078F DIAST BP <80 MM HG: CPT | Performed by: FAMILY MEDICINE

## 2024-12-24 PROCEDURE — 3074F SYST BP LT 130 MM HG: CPT | Performed by: FAMILY MEDICINE

## 2024-12-24 PROCEDURE — 99214 OFFICE O/P EST MOD 30 MIN: CPT | Performed by: FAMILY MEDICINE

## 2024-12-24 RX ORDER — SENNA AND DOCUSATE SODIUM 50; 8.6 MG/1; MG/1
1 TABLET, FILM COATED ORAL 2 TIMES DAILY
Qty: 60 TABLET | Refills: 1 | Status: SHIPPED | OUTPATIENT
Start: 2024-12-24

## 2024-12-24 NOTE — ASSESSMENT & PLAN NOTE
History of traumatic head injury from boxing  Continue Sinemet  Follow-up with neurology outpatient

## 2024-12-24 NOTE — PROGRESS NOTES
Name: Hemanth Burt      : 1990      MRN: [id number]  Encounter Provider: Moy Suarez MD  Encounter Date: 2024   Encounter department: [facility]  :  Assessment & Plan  Left upper quadrant abdominal pain  History of chronic abdominal discomfort  Associated with poor appetite, chronic constipation, abdominal bloating, and early satiety  Continue follow-up with GI  Scheduled for gastric emptying study to evaluate for gastroparesis      Orders:    Celiac Disease Panel; Future    Abdominal bloating    Orders:    Celiac Disease Panel; Future    Constipation, unspecified constipation type    Orders:    senna-docusate sodium (SENOKOT-S) 8.6-50 mg per tablet; Take 1 tablet by mouth 2 (two) times a day    Early satiety  Gastric emptying study pending  Continue follow-up with GI outpatient           Parkinson's disease with dyskinesia, unspecified whether manifestations fluctuate (HCC)  History of traumatic head injury from boxing  Continue Sinemet  Follow-up with neurology outpatient       Leukopenia, unspecified type  Mild leukopenia  Repeat CBC in a few months  Orders:    CBC and differential; Future    Need for hepatitis C screening test    Orders:    Hepatitis C Antibody; Future    Screening for HIV (human immunodeficiency virus)    Orders:    HIV 1/2 AG/AB w Reflex [facility] for 2 yr old and above; Future    Poor nutrition    Orders:    Vitamin D 25 hydroxy; Future    Vitamin B12; Future    Moderately severe major depression (HCC)  Depression Screening Follow-up Plan: Patient's depression screening was positive with a PHQ-2 score of 6. Their PHQ-9 score was 18. Patient assessed for underlying major depression. They have no active suicidal ideations. Brief counseling provided and recommend additional follow-up/re-evaluation next office visit. Continue regular follow-up with their psychologist/therapist/psychiatrist who is managing their mental health  condition(s).                 Depression Screening and Follow-up Plan: Patient's depression screening was positive with a PHQ-2 score of 6. Their PHQ-9 score was 18. Patient assessed for underlying major depression. Brief counseling provided and recommend additional follow-up/re-evaluation next office visit. Continue regular follow-up with their mental health provider who is managing their mental health condition(s).       History of Present Illness     34-year-old male with a history of early onset Parkinson's disease who presents today to [facility] care.  His main concern today is chronic abdominal complaints.  He reports chronic left upper quadrant abdominal pain that is worse after food intake.  He also reports longstanding history of bloating, excessive gassiness, poor appetite, and occasional constipation.  He states that he cannot have a bowel movement without taking a stool softener.  He is currently following up with GI.  He is scheduled for gastric emptying study.  He is also very concerned about some of his lab work.  He states that he did some research online and he is concerned about his results.      Review of Systems   Constitutional:  Positive for appetite change and unexpected weight change. Negative for chills, diaphoresis, fatigue and fever.   Respiratory:  Negative for shortness of breath and wheezing.    Cardiovascular:  Negative for chest pain and palpitations.   Gastrointestinal:  Positive for abdominal pain, constipation and nausea. Negative for blood in stool and vomiting.   Endocrine: Negative for polydipsia, polyphagia and polyuria.   Genitourinary:  Negative for dysuria, hematuria and urgency.   Musculoskeletal:  Negative for arthralgias, back pain and neck stiffness.   Skin:  Negative for rash.   Neurological:  Positive for tremors and speech difficulty. Negative for seizures and headaches.   Psychiatric/Behavioral:  Negative for confusion and dysphoric mood.        Objective   /72  "(BP Location: Left arm, Patient Position: Sitting, Cuff Size: Standard)   Pulse 67   Temp 97.5 °F (36.4 °C) (Temporal)   Resp 16   Ht 5' 7\" (1.702 m)   Wt 65.3 kg (144 lb)   SpO2 97%   BMI 22.55 kg/m²      Physical Exam  Vitals reviewed.   Constitutional:       General: He is not in acute distress.     Appearance: Normal appearance. He is well-developed. He is not ill-appearing, toxic-appearing or diaphoretic.   HENT:      Head: Normocephalic and atraumatic.      Right Ear: External ear normal.      Left Ear: External ear normal.      Nose: Nose normal.      Mouth/Throat:      Mouth: Mucous membranes are moist.      Pharynx: No oropharyngeal exudate or posterior oropharyngeal erythema.   Eyes:      General: No scleral icterus.        Right eye: No discharge.         Left eye: No discharge.      Extraocular Movements: Extraocular movements intact.      Conjunctiva/sclera: Conjunctivae normal.   Cardiovascular:      Rate and Rhythm: Normal rate and regular rhythm.      Heart sounds: Normal heart sounds. No murmur heard.     No friction rub. No gallop.   Pulmonary:      Effort: Pulmonary effort is normal. No respiratory distress.      Breath sounds: Normal breath sounds. No stridor. No wheezing or rhonchi.   Abdominal:      General: Bowel sounds are normal. There is no distension.      Palpations: Abdomen is soft. There is no mass.      Tenderness: There is no guarding.   Musculoskeletal:         General: Normal range of motion.      Cervical back: Normal range of motion.      Right lower leg: No edema.      Left lower leg: No edema.   Skin:     General: Skin is warm.   Neurological:      Mental Status: He is alert and oriented to person, place, and time.      Gait: Gait abnormal (Slow,shuffling gait).      Comments: Right dystonic tremor, Bradykinesia ,Shuffling gait , & Slurred speech   Psychiatric:         Mood and Affect: Mood normal.         Behavior: Behavior normal.         "

## 2024-12-24 NOTE — PATIENT INSTRUCTIONS
"Patient Education     Depression in adults   The Basics   Written by the doctors and editors at [facility]   What is depression? -- Depression is a disorder that makes you sad, but it is different from normal sadness. Depression can make it hard for you to work, study, or do everyday tasks.  What causes depression? -- Depression is caused by problems with chemicals in the brain called \"neurotransmitters.\" Some people might be more likely to have depression if it runs in their family. Other things might also play a role, including hormones, certain health problems, medicines, stress, being mistreated as a child, family problems, and problems with friends or at school or work.  How do I know if I am depressed? -- People with depression feel down most of the time for at least 2 weeks. They also have at least 1 of these 2 symptoms:   They no longer enjoy or care about doing the things that they used to like to do.   They feel sad, down, hopeless, or cranky most of the day, almost every day.  People with depression can also have other symptoms. Examples include:   Changes in your appetite or weight. You might eat too little or too much, or gain or lose weight without trying.   Sleeping too much or too little   Feeling tired or like you have no energy   Feeling guilty, helpless, or like you are worth nothing   Trouble with concentration or memory   Acting restless or have trouble staying still, or moving or speaking more slowly than normal   Repeated thoughts of death or killing yourself  If you think that you might be depressed, see your doctor or nurse. Only someone trained in mental health can tell for sure if you are depressed.  How is depression diagnosed? -- Your doctor or nurse will do a physical exam, ask you questions, and might order tests. Depression can have a big impact on your life. Luckily, depression can be treated, and the sooner treatment is started, the better it works.  Get help right away if you are " "thinking of hurting or killing yourself! -- If you ever feel like you might hurt yourself or someone else, help is available:   In the [place], contact the 216 Suicide & Crisis Lifeline:   To speak to someone, call or text 486.   To talk to someone online, go to www.[employer].org/chat.   Call your doctor or nurse, and tell them it is urgent.   Call for an ambulance (in the [place] and [place], call 9-1-1).   Go to the emergency department at the nearest hospital.  What are the treatments for depression? -- Your doctor or nurse will work with you to make a treatment plan. Treatment can include:   Helping you learn more about depression   Counseling (with a psychiatrist, psychologist, nurse, or )   Medicines that relieve depression   Creating a plan to limit access to items that you might use to harm yourself   Other treatments that pass magnetic waves or electricity into the brain  In addition to treatment, getting regular physical activity can also help you feel better.  People with depression that is not too severe can get better by taking medicines or talking with a counselor. People with severe depression usually need medicines to get better, and might also need to see a counselor.  Another treatment involves placing a device against the scalp to pass magnetic waves into the brain. This is called \"transcranial magnetic stimulation\" (\"TMS\"). Doctors might suggest TMS if medicines and counseling have not helped.  Some people with severe depression might need a treatment called \"electroconvulsive therapy\" (\"ECT\"). During ECT, doctors pass an electric current through a person's brain in a safe way.  When will I feel better? -- Most treatment options take a little while to start working.   Many people who take medicines start to feel better within 2 weeks, but it might be 4 to 8 weeks before the medicine has its full effect.   Many people who see a counselor start to feel better within a few weeks, but it might " take 8 to 10 weeks to get the greatest benefit.  If the first treatment you try does not help you, tell your doctor or nurse, but do not give up. Some people need to try different treatments or combinations of treatments before they find an approach that works. Your doctor, nurse, or counselor can work with you to find the treatment that is right for you. They can also help you figure out how to cope while you search for the right treatment or are waiting for your treatment to start working.  How do I decide which treatment to have? -- You and your doctor or nurse will need to work together to choose a treatment for you. Medicines might work a little faster than counseling. But medicines can also cause side effects. Plus, some people do not like the idea of taking medicine.  Seeing a counselor involves talking about your feelings. That is also hard for some people.  What if I take medicine for depression and I want to have a baby? -- Some depression medicines can cause problems for an unborn baby. But having untreated depression during pregnancy can also cause problems. If you want to get pregnant, tell your doctor but do not stop taking your medicines. Together, you can plan the safest way for you to have your baby.  It's also important to talk with your doctor if you want to breastfeed after your baby is born. Breastfeeding has lots of benefits for both mother and baby. Some depression medicines are safer than others to use while breastfeeding. But having untreated depression after giving birth can also cause problems, so do not stop taking your medicines. Your doctor can work with you to plan the safest way for you to feed your baby.  All topics are updated as new evidence becomes available and our peer review process is complete.  This topic retrieved from [employer] on: Mar 06, 2024.  Topic 66938 Version 22.0  Release: 32.2.4 - C32.64  © 2024 UpToDate, Inc. and/or its affiliates. All rights reserved.  figure 1:  "Mood disorders caused by problems in the brain     Mooddisorders, such as depression and bipolar disorder, are caused by problems with\"neurotransmitters.\" These are chemicals in the brain that can affect your emotions.Treatments for mood disorders seem to work by changing the levels of certainneurotransmitters.  Graphic 63470 Version 4.0  Consumer Information Use and Disclaimer   Disclaimer: This generalized information is a limited summary of diagnosis, treatment, and/or medication information. It is not meant to be comprehensive and should be used as a tool to help the user understand and/or assess potential diagnostic and treatment options. It does NOT include all information about conditions, treatments, medications, side effects, or risks that may apply to a specific patient. It is not intended to be medical advice or a substitute for the medical advice, diagnosis, or treatment of a health care provider based on the health care provider's examination and assessment of a patient's specific and unique circumstances. Patients must speak with a health care provider for complete information about their health, medical questions, and treatment options, including any risks or benefits regarding use of medications. This information does not endorse any treatments or medications as safe, effective, or approved for treating a specific patient. UpToDate, Inc. and its affiliates disclaim any warranty or liability relating to this information or the use thereof.The use of this information is governed by the Terms of Use, available at https://www.[employer].com/en/know/clinical-effectiveness-terms. 2024© UpToDate, Inc. and its affiliates and/or licensors. All rights reserved.  Copyright   © 2024 UpToDate, Inc. and/or its affiliates. All rights reserved.    "

## 2024-12-24 NOTE — ASSESSMENT & PLAN NOTE
History of chronic abdominal discomfort  Associated with poor appetite, chronic constipation, abdominal bloating, and early satiety  Continue follow-up with GI  Scheduled for gastric emptying study to evaluate for gastroparesis      Orders:    Celiac Disease Panel; Future

## 2025-01-02 ENCOUNTER — HOSPITAL ENCOUNTER (OUTPATIENT)
Dept: RADIOLOGY | Facility: HOSPITAL | Age: 35
End: 2025-01-02
Attending: STUDENT IN AN ORGANIZED HEALTH CARE EDUCATION/TRAINING PROGRAM
Payer: COMMERCIAL

## 2025-01-02 ENCOUNTER — RESULTS FOLLOW-UP (OUTPATIENT)
Dept: GASTROENTEROLOGY | Facility: CLINIC | Age: 35
End: 2025-01-02

## 2025-01-02 DIAGNOSIS — R10.12 LUQ ABDOMINAL PAIN: ICD-10-CM

## 2025-01-02 PROCEDURE — A9541 TC99M SULFUR COLLOID: HCPCS

## 2025-01-02 PROCEDURE — 78264 GASTRIC EMPTYING IMG STUDY: CPT

## 2025-01-05 ENCOUNTER — HOSPITAL ENCOUNTER (EMERGENCY)
Facility: HOSPITAL | Age: 35
Discharge: HOME | End: 2025-01-06
Attending: EMERGENCY MEDICINE
Payer: COMMERCIAL

## 2025-01-05 VITALS
SYSTOLIC BLOOD PRESSURE: 124 MMHG | RESPIRATION RATE: 20 BRPM | HEART RATE: 66 BPM | OXYGEN SATURATION: 100 % | TEMPERATURE: 97.6 F | DIASTOLIC BLOOD PRESSURE: 84 MMHG

## 2025-01-05 DIAGNOSIS — Z71.1 WORRIED WELL: ICD-10-CM

## 2025-01-05 DIAGNOSIS — G20.A1 PARKINSON'S DISEASE (HCC): Primary | ICD-10-CM

## 2025-01-05 PROCEDURE — 99283 EMERGENCY DEPT VISIT LOW MDM: CPT
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
## 2025-01-06 NOTE — DISCHARGE INSTRUCTIONS
Follow-up with PCP.  Return to ED if symptoms worsen, fail to improve, or develop as listed in follow-up section.

## 2025-01-22 ENCOUNTER — OFFICE VISIT (OUTPATIENT)
Dept: FAMILY MEDICINE CLINIC | Facility: CLINIC | Age: 35
End: 2025-01-22

## 2025-01-22 VITALS
RESPIRATION RATE: 18 BRPM | OXYGEN SATURATION: 97 % | BODY MASS INDEX: 22.9 KG/M2 | WEIGHT: 145.9 LBS | TEMPERATURE: 97.9 F | HEART RATE: 83 BPM | HEIGHT: 67 IN | DIASTOLIC BLOOD PRESSURE: 68 MMHG | SYSTOLIC BLOOD PRESSURE: 116 MMHG

## 2025-01-22 DIAGNOSIS — F33.1 MODERATE RECURRENT MAJOR DEPRESSION (HCC): Primary | ICD-10-CM

## 2025-01-22 DIAGNOSIS — Z00.00 ANNUAL PHYSICAL EXAM: ICD-10-CM

## 2025-01-22 PROCEDURE — 99395 PREV VISIT EST AGE 18-39: CPT | Performed by: FAMILY MEDICINE

## 2025-01-22 NOTE — ASSESSMENT & PLAN NOTE
Depression Screening Follow-up Plan:   Denies suicidal homicidal ideation  Continue outpatient mental health therapy at [facility]

## 2025-01-22 NOTE — PATIENT INSTRUCTIONS
"Patient Education     Routine physical for adults   The Basics   Written by the doctors and editors at [facility]   What is a physical? -- A physical is a routine visit, or \"check-up,\" with your doctor. You might also hear it called a \"wellness visit\" or \"preventive visit.\"  During each visit, the doctor will:   Ask about your physical and mental health   Ask about your habits, behaviors, and lifestyle   Do an exam   Give you vaccines if needed   Talk to you about any medicines you take   Give advice about your health   Answer your questions  Getting regular check-ups is an important part of taking care of your health. It can help your doctor find and treat any problems you have. But it's also important for preventing health problems.  A routine physical is different from a \"sick visit.\" A sick visit is when you see a doctor because of a health concern or problem. Since physicals are scheduled ahead of time, you can think about what you want to ask the doctor.  How often should I get a physical? -- It depends on your age and health. In general, for people age 21 years and older:   If you are younger than 50 years, you might be able to get a physical every 3 years.   If you are 50 years or older, your doctor might recommend a physical every year.  If you have an ongoing health condition, like diabetes or high blood pressure, your doctor will probably want to see you more often.  What happens during a physical? -- In general, each visit will include:   Physical exam - The doctor or nurse will check your height, weight, heart rate, and blood pressure. They will also look at your eyes and ears. They will ask about how you are feeling and whether you have any symptoms that bother you.   Medicines - It's a good idea to bring a list of all the medicines you take to each doctor visit. Your doctor will talk to you about your medicines and answer any questions. Tell them if you are having any side effects that bother you. You " "should also tell them if you are having trouble paying for any of your medicines.   Habits and behaviors - This includes:   Your diet   Your exercise habits   Whether you smoke, drink alcohol, or use drugs   Whether you are sexually active   Whether you feel safe at home  Your doctor will talk to you about things you can do to improve your health and lower your risk of health problems. They will also offer help and support. For example, if you want to quit smoking, they can give you advice and might prescribe medicines. If you want to improve your diet or get more physical activity, they can help you with this, too.   Lab tests, if needed - The tests you get will depend on your age and situation. For example, your doctor might want to check your:   Cholesterol   Blood sugar   Iron level   Vaccines - The recommended vaccines will depend on your age, health, and what vaccines you already had. Vaccines are very important because they can prevent certain serious or deadly infections.   Discussion of screening - \"Screening\" means checking for diseases or other health problems before they cause symptoms. Your doctor can recommend screening based on your age, risk, and preferences. This might include tests to check for:   Cancer, such as breast, prostate, cervical, ovarian, colorectal, prostate, lung, or skin cancer   Sexually transmitted infections, such as chlamydia and gonorrhea   Mental health conditions like depression and anxiety  Your doctor will talk to you about the different types of screening tests. They can help you decide which screenings to have. They can also explain what the results might mean.   Answering questions - The physical is a good time to ask the doctor or nurse questions about your health. If needed, they can refer you to other doctors or specialists, too.  Adults older than 65 years often need other care, too. As you get older, your doctor will talk to you about:   How to prevent falling at " home   Hearing or vision tests   Memory testing   How to take your medicines safely   Making sure that you have the help and support you need at home  All topics are updated as new evidence becomes available and our peer review process is complete.  This topic retrieved from [employer] on: May 02, 2024.  Topic 976108 Version 1.0  Release: 32.4.3 - C32.122  © 2024 UpToDate, Inc. and/or its affiliates. All rights reserved.  Consumer Information Use and Disclaimer   Disclaimer: This generalized information is a limited summary of diagnosis, treatment, and/or medication information. It is not meant to be comprehensive and should be used as a tool to help the user understand and/or assess potential diagnostic and treatment options. It does NOT include all information about conditions, treatments, medications, side effects, or risks that may apply to a specific patient. It is not intended to be medical advice or a substitute for the medical advice, diagnosis, or treatment of a health care provider based on the health care provider's examination and assessment of a patient's specific and unique circumstances. Patients must speak with a health care provider for complete information about their health, medical questions, and treatment options, including any risks or benefits regarding use of medications. This information does not endorse any treatments or medications as safe, effective, or approved for treating a specific patient. UpToDate, Inc. and its affiliates disclaim any warranty or liability relating to this information or the use thereof.The use of this information is governed by the Terms of Use, available at https://www.[employer]ble Imaginguwer.com/en/know/clinical-effectiveness-terms. 2024© UpToDate, Inc. and its affiliates and/or licensors. All rights reserved.  Copyright   © 2024 UpToDate, Inc. and/or its affiliates. All rights reserved.

## 2025-02-04 ENCOUNTER — TELEPHONE (OUTPATIENT)
Dept: FAMILY MEDICINE CLINIC | Facility: CLINIC | Age: 35
End: 2025-02-04

## 2025-02-05 ENCOUNTER — APPOINTMENT (OUTPATIENT)
Dept: LAB | Facility: HOSPITAL | Age: 35
End: 2025-02-05
Payer: COMMERCIAL

## 2025-02-05 ENCOUNTER — OFFICE VISIT (OUTPATIENT)
Dept: FAMILY MEDICINE CLINIC | Facility: CLINIC | Age: 35
End: 2025-02-05

## 2025-02-05 VITALS
RESPIRATION RATE: 16 BRPM | WEIGHT: 144 LBS | SYSTOLIC BLOOD PRESSURE: 118 MMHG | OXYGEN SATURATION: 98 % | DIASTOLIC BLOOD PRESSURE: 70 MMHG | HEART RATE: 99 BPM | BODY MASS INDEX: 22.55 KG/M2 | TEMPERATURE: 96.8 F

## 2025-02-05 DIAGNOSIS — R10.12 LEFT UPPER QUADRANT ABDOMINAL PAIN: ICD-10-CM

## 2025-02-05 DIAGNOSIS — K59.00 CONSTIPATION, UNSPECIFIED CONSTIPATION TYPE: ICD-10-CM

## 2025-02-05 DIAGNOSIS — D72.819 LEUKOPENIA, UNSPECIFIED TYPE: Primary | ICD-10-CM

## 2025-02-05 DIAGNOSIS — Z11.4 SCREENING FOR HIV (HUMAN IMMUNODEFICIENCY VIRUS): ICD-10-CM

## 2025-02-05 DIAGNOSIS — R14.0 ABDOMINAL BLOATING: ICD-10-CM

## 2025-02-05 DIAGNOSIS — Z11.59 NEED FOR HEPATITIS C SCREENING TEST: ICD-10-CM

## 2025-02-05 DIAGNOSIS — R19.5 MUCUS IN STOOL: Primary | ICD-10-CM

## 2025-02-05 DIAGNOSIS — E63.9 POOR NUTRITION: ICD-10-CM

## 2025-02-05 LAB
25(OH)D3 SERPL-MCNC: 50.4 NG/ML (ref 30–100)
BASOPHILS # BLD AUTO: 0.06 THOUSANDS/ΜL (ref 0–0.1)
BASOPHILS NFR BLD AUTO: 1 % (ref 0–1)
EOSINOPHIL # BLD AUTO: 0.36 THOUSAND/ΜL (ref 0–0.61)
EOSINOPHIL NFR BLD AUTO: 7 % (ref 0–6)
ERYTHROCYTE [DISTWIDTH] IN BLOOD BY AUTOMATED COUNT: 11.9 % (ref 11.6–15.1)
HCT VFR BLD AUTO: 44.4 % (ref 36.5–49.3)
HGB BLD-MCNC: 15 G/DL (ref 12–17)
IGA SERPL-MCNC: 164 MG/DL (ref 66–433)
IMM GRANULOCYTES # BLD AUTO: 0.02 THOUSAND/UL (ref 0–0.2)
IMM GRANULOCYTES NFR BLD AUTO: 0 % (ref 0–2)
LYMPHOCYTES # BLD AUTO: 1.22 THOUSANDS/ΜL (ref 0.6–4.47)
LYMPHOCYTES NFR BLD AUTO: 23 % (ref 14–44)
MCH RBC QN AUTO: 32.5 PG (ref 26.8–34.3)
MCHC RBC AUTO-ENTMCNC: 33.8 G/DL (ref 31.4–37.4)
MCV RBC AUTO: 96 FL (ref 82–98)
MONOCYTES # BLD AUTO: 0.43 THOUSAND/ΜL (ref 0.17–1.22)
MONOCYTES NFR BLD AUTO: 8 % (ref 4–12)
NEUTROPHILS # BLD AUTO: 3.31 THOUSANDS/ΜL (ref 1.85–7.62)
NEUTS SEG NFR BLD AUTO: 61 % (ref 43–75)
NRBC BLD AUTO-RTO: 0 /100 WBCS
PLATELET # BLD AUTO: 281 THOUSANDS/UL (ref 149–390)
PMV BLD AUTO: 9.5 FL (ref 8.9–12.7)
RBC # BLD AUTO: 4.62 MILLION/UL (ref 3.88–5.62)
TTG IGA SER IA-ACNC: 0.4 U/ML (ref ?–10)
VIT B12 SERPL-MCNC: 420 PG/ML (ref 180–914)
WBC # BLD AUTO: 5.4 THOUSAND/UL (ref 4.31–10.16)

## 2025-02-05 PROCEDURE — 82306 VITAMIN D 25 HYDROXY: CPT

## 2025-02-05 PROCEDURE — 99214 OFFICE O/P EST MOD 30 MIN: CPT | Performed by: FAMILY MEDICINE

## 2025-02-05 PROCEDURE — 82607 VITAMIN B-12: CPT

## 2025-02-05 PROCEDURE — 85025 COMPLETE CBC W/AUTO DIFF WBC: CPT

## 2025-02-05 PROCEDURE — 3078F DIAST BP <80 MM HG: CPT | Performed by: FAMILY MEDICINE

## 2025-02-05 PROCEDURE — 3074F SYST BP LT 130 MM HG: CPT | Performed by: FAMILY MEDICINE

## 2025-02-05 PROCEDURE — 86803 HEPATITIS C AB TEST: CPT

## 2025-02-05 PROCEDURE — 86364 TISS TRNSGLTMNASE EA IG CLAS: CPT

## 2025-02-05 PROCEDURE — 82784 ASSAY IGA/IGD/IGG/IGM EACH: CPT

## 2025-02-05 PROCEDURE — 36415 COLL VENOUS BLD VENIPUNCTURE: CPT

## 2025-02-05 PROCEDURE — 87389 HIV-1 AG W/HIV-1&-2 AB AG IA: CPT

## 2025-02-05 RX ORDER — POLYETHYLENE GLYCOL 3350 17 G/17G
17 POWDER, FOR SOLUTION ORAL DAILY
Qty: 510 G | Refills: 1 | Status: SHIPPED | OUTPATIENT
Start: 2025-02-05

## 2025-02-05 RX ORDER — SENNA AND DOCUSATE SODIUM 50; 8.6 MG/1; MG/1
1 TABLET, FILM COATED ORAL 2 TIMES DAILY
Qty: 60 TABLET | Refills: 1 | Status: SHIPPED | OUTPATIENT
Start: 2025-02-05

## 2025-02-05 NOTE — PROGRESS NOTES
Name: Hemanth Burt      : 1990      MRN: [id number]  Encounter Provider: Moy Suarez MD  Encounter Date: 2025   Encounter department: [facility]  :  Assessment & Plan  Mucus in stool    Orders:    Stool Enteric Bacterial Panel by PCR; Future    Ova and parasite examination; Future    Calprotectin,Fecal; Future    Left upper quadrant abdominal pain    Orders:    Stool Enteric Bacterial Panel by PCR; Future    Ova and parasite examination; Future    Calprotectin,Fecal; Future    Constipation, unspecified constipation type  I suspect his symptoms might be related to IBS  Offered Linzess but he declines  He had symptomatic improvement in the past with miralax  Recommend GI follow up  Orders:    polyethylene glycol (GLYCOLAX) 17 GM/SCOOP powder; Take 17 g by mouth daily    senna-docusate sodium (SENOKOT-S) 8.6-50 mg per tablet; Take 1 tablet by mouth 2 (two) times a day           History of Present Illness   35-year-old male with a history of Parkinson disease who presents today for follow-up.  He continues to experience various abdominal symptoms.  He reports intermittent constipation, mucus in his stool, excessive gas, excessive bloating and left upper quadrant abdominal discomfort.  His GI symptoms tend to get better with bowel movement.  He believes that his significant other had placed some dog feces in his food and drinks over the years.  He would like to check extensive blood/stool test.  He reports feeling very anxious about his health.  He went online and has been doing a lot of research about his previous lab work.  He is very worried about some of his blood test results and would like to review those today.  He has undergone EGD and colonoscopy without any significant finding.  He was diagnosed with H. pylori in the past and treated for this condition.  He reports that he was also previously diagnosed with IBS and he was placed on Linzess.  He is  currently taking Senokot and over-the-counter Gas-X which has not been very effective.  He would like to try MiraLAX which she found helpful in the past.      Review of Systems   Constitutional:  Positive for appetite change and unexpected weight change. Negative for chills, diaphoresis, fatigue and fever.   HENT:  Negative for congestion and sore throat.    Eyes:  Negative for visual disturbance.   Respiratory:  Negative for shortness of breath and wheezing.    Cardiovascular:  Negative for chest pain and palpitations.   Gastrointestinal:  Positive for abdominal pain (Chronic intermittent), constipation (Chronic intermittent) and nausea (Chronic intermittent). Negative for blood in stool and vomiting.   Endocrine: Negative for polydipsia, polyphagia and polyuria.   Genitourinary:  Negative for dysuria, hematuria and urgency.   Musculoskeletal:  Negative for arthralgias.   Skin:  Negative for rash.   Neurological:  Positive for tremors (Chronic) and speech difficulty. Negative for seizures and headaches.   Psychiatric/Behavioral:  Positive for dysphoric mood. Negative for agitation and suicidal ideas. The patient is not nervous/anxious.        Objective   /70 (BP Location: Right arm, Patient Position: Sitting, Cuff Size: Large)   Pulse 99   Temp (!) 96.8 °F (36 °C) (Temporal)   Resp 16   Wt 65.3 kg (144 lb)   SpO2 98%   BMI 22.55 kg/m²      Physical Exam  Vitals reviewed.   Constitutional:       General: He is not in acute distress.     Appearance: Normal appearance. He is well-developed. He is not ill-appearing, toxic-appearing or diaphoretic.   HENT:      Head: Normocephalic and atraumatic.      Right Ear: Tympanic membrane and external ear normal.      Left Ear: External ear normal.      Nose: Nose normal.      Mouth/Throat:      Mouth: Mucous membranes are moist.      Pharynx: No oropharyngeal exudate or posterior oropharyngeal erythema.   Eyes:      General: No scleral icterus.        Right eye: No  discharge.         Left eye: No discharge.      Extraocular Movements: Extraocular movements intact.      Conjunctiva/sclera: Conjunctivae normal.   Cardiovascular:      Rate and Rhythm: Normal rate and regular rhythm.      Heart sounds: Normal heart sounds. No murmur heard.     No friction rub. No gallop.   Pulmonary:      Effort: Pulmonary effort is normal. No respiratory distress.      Breath sounds: Normal breath sounds. No stridor. No wheezing or rhonchi.   Abdominal:      General: Bowel sounds are normal. There is no distension.      Palpations: Abdomen is soft.      Tenderness: There is no abdominal tenderness. There is no guarding.   Musculoskeletal:         General: Normal range of motion.      Cervical back: Normal range of motion.   Skin:     General: Skin is warm.      Coloration: Skin is not jaundiced.   Neurological:      Mental Status: He is alert and oriented to person, place, and time. Mental status is at baseline.      Gait: Gait abnormal (Slow,shuffling gait).      Comments: Right dystonic tremor, Bradykinesia ,Shuffling gait , & Slurred speech   Psychiatric:         Behavior: Behavior normal.

## 2025-02-05 NOTE — ASSESSMENT & PLAN NOTE
Orders:    Stool Enteric Bacterial Panel by PCR; Future    Ova and parasite examination; Future    Calprotectin,Fecal; Future

## 2025-02-06 LAB
HCV AB SER QL: NORMAL
HIV 1+2 AB+HIV1 P24 AG SERPL QL IA: NORMAL
HIV 2 AB SERPL QL IA: NORMAL
HIV1 AB SERPL QL IA: NORMAL
HIV1 P24 AG SERPL QL IA: NORMAL

## 2025-02-11 ENCOUNTER — APPOINTMENT (OUTPATIENT)
Dept: LAB | Facility: HOSPITAL | Age: 35
End: 2025-02-11

## 2025-02-12 ENCOUNTER — APPOINTMENT (OUTPATIENT)
Dept: LAB | Facility: HOSPITAL | Age: 35
End: 2025-02-12
Payer: COMMERCIAL

## 2025-02-12 DIAGNOSIS — R19.5 MUCUS IN STOOL: ICD-10-CM

## 2025-02-12 DIAGNOSIS — R10.12 LEFT UPPER QUADRANT ABDOMINAL PAIN: ICD-10-CM

## 2025-02-12 PROCEDURE — 87177 OVA AND PARASITES SMEARS: CPT

## 2025-02-12 PROCEDURE — 87505 NFCT AGENT DETECTION GI: CPT

## 2025-02-12 PROCEDURE — 83993 ASSAY FOR CALPROTECTIN FECAL: CPT

## 2025-02-12 PROCEDURE — 87209 SMEAR COMPLEX STAIN: CPT

## 2025-02-13 LAB
C COLI+JEJUNI TUF STL QL NAA+PROBE: NEGATIVE
EC STX1+STX2 GENES STL QL NAA+PROBE: NEGATIVE
SALMONELLA SP SPAO STL QL NAA+PROBE: NEGATIVE
SHIGELLA SP+EIEC IPAH STL QL NAA+PROBE: NEGATIVE

## 2025-02-14 LAB — CALPROTECTIN STL-MCNC: 6.85 ΜG/G

## 2025-02-20 ENCOUNTER — OFFICE VISIT (OUTPATIENT)
Dept: FAMILY MEDICINE CLINIC | Facility: CLINIC | Age: 35
End: 2025-02-20

## 2025-02-20 VITALS
TEMPERATURE: 96.4 F | HEART RATE: 101 BPM | OXYGEN SATURATION: 97 % | RESPIRATION RATE: 16 BRPM | BODY MASS INDEX: 22.44 KG/M2 | SYSTOLIC BLOOD PRESSURE: 122 MMHG | WEIGHT: 143 LBS | DIASTOLIC BLOOD PRESSURE: 78 MMHG | HEIGHT: 67 IN

## 2025-02-20 DIAGNOSIS — R10.84 GENERALIZED ABDOMINAL PAIN: Primary | ICD-10-CM

## 2025-02-20 DIAGNOSIS — M25.50 POLYARTHRALGIA: ICD-10-CM

## 2025-02-20 DIAGNOSIS — K59.00 CONSTIPATION, UNSPECIFIED CONSTIPATION TYPE: ICD-10-CM

## 2025-02-20 DIAGNOSIS — R45.89 ANXIETY ABOUT HEALTH: ICD-10-CM

## 2025-02-20 DIAGNOSIS — R68.81 EARLY SATIETY: ICD-10-CM

## 2025-02-20 DIAGNOSIS — R63.0 POOR APPETITE: ICD-10-CM

## 2025-02-20 DIAGNOSIS — R63.4 WEIGHT LOSS: ICD-10-CM

## 2025-02-20 PROCEDURE — 3074F SYST BP LT 130 MM HG: CPT | Performed by: FAMILY MEDICINE

## 2025-02-20 PROCEDURE — 3078F DIAST BP <80 MM HG: CPT | Performed by: FAMILY MEDICINE

## 2025-02-20 PROCEDURE — 99214 OFFICE O/P EST MOD 30 MIN: CPT | Performed by: FAMILY MEDICINE

## 2025-02-20 RX ORDER — POLYETHYLENE GLYCOL 3350 17 G/17G
17 POWDER, FOR SOLUTION ORAL DAILY
Qty: 510 G | Refills: 1 | Status: SHIPPED | OUTPATIENT
Start: 2025-02-20

## 2025-02-20 NOTE — ASSESSMENT & PLAN NOTE
Patient clinically seems anxious.  He does not believe that he has anxiety.  He seems overly preoccupied with the possibility of abdominal malignancy may have been missed from his previous colonoscopy.  He has been more concerned about his health after he was diagnosed with Parkinson's many years ago.    I recommend outpatient mental health therapy to deal with illness related anxiety.  He has been provided with outpatient resources to establish care with mental health therapy.

## 2025-02-20 NOTE — PROGRESS NOTES
Name: Hemanth Burt      : 1990      MRN: [id number]  Encounter Provider: Moy Suarez MD  Encounter Date: 2025   Encounter department: [facility]  :  Assessment & Plan  Generalized abdominal pain  Patient with persistent abdominal pain, bloating, difficulty passing gas,  Patient is convinced that his symptoms are related to undiagnosed malignancy or intra-abdominal infectious process  Extensive review of his labs showed normal stool test and negative celiac test  Gastric emptying study test was negative  His colonoscopy was unremarkable although test limited by poor prep  He will need repeat colonoscopy  He is very adamant that he wants an MRI done for further evaluation  We discussed that his symptoms could be related to autonomic dysfunction from his Parkinson disease.  Other differentials include irritable bowel syndrome.      Orders:    MRI abdomen w wo contrast; Future    Anxiety about health  Patient clinically seems anxious.  He does not believe that he has anxiety.  He seems overly preoccupied with the possibility of abdominal malignancy may have been missed from his previous colonoscopy.  He has been more concerned about his health after he was diagnosed with Parkinson's many years ago.    I recommend outpatient mental health therapy to deal with illness related anxiety.  He has been provided with outpatient resources to establish care with mental health therapy.       Early satiety    Orders:    MRI abdomen w wo contrast; Future    Cortisol Level, AM Specimen; Future    Weight loss    Orders:    MRI abdomen w wo contrast; Future    Cortisol Level, AM Specimen; Future    SARAH Screen w/Reflex Cascade; Future    Poor appetite    Orders:    MRI abdomen w wo contrast; Future    Cortisol Level, AM Specimen; Future    Polyarthralgia  Patient reports his son has lupus and his Mother has rheumatoid arthritis  Given constellation of symptoms including  multiple joint pain and fatigue.  We will check for autoimmune condition.  Orders:    SARAH Screen w/Reflex Cascade; Future    RF Screen w/ Reflex to Titer; Future    C-reactive protein; Future    Sedimentation rate, automated; Future    Cyclic citrul peptide antibody, IgG; Future    Constipation, unspecified constipation type    Orders:    polyethylene glycol (GLYCOLAX) 17 GM/SCOOP powder; Take 17 g by mouth daily           History of Present Illness   35-year-old male with a history of Parkinson disease who presents today for follow-up.  He would like to review his blood work.  He is frustrated about his symptoms.  He reports that he he continues to experience weight loss, poor appetite, early satiety, multiple joint pain, abdominal pain, and abdominal bloating.  He seems to be very anxious about the possibility of missed diagnosis of cancer or intra-abdominal infection.  He had a colonoscopy done recently with poor prep.  He reports that he is unable to tolerate stool softener while he is on a clear liquid diet in preparation for colonoscopy.  He is not sure if he will be able to tolerate a repeat colonoscopy.  He is requesting additional blood test and abdominal MRI.  He has been reading a lot online about different diagnoses that could explain his symptoms.  He is not very convinced that his symptoms are unrelated to irritable bowel syndrome.  He reports that he has tried FODMAP diet with no improvement.  He was recently prescribed MiraLAX but he did not get a chance to pick this up from the pharmacy.  He is requesting a new prescription        Review of Systems   Constitutional:  Positive for appetite change, fatigue and unexpected weight change. Negative for chills, diaphoresis and fever.   Eyes:  Negative for visual disturbance.   Respiratory:  Negative for shortness of breath and wheezing.    Cardiovascular:  Negative for chest pain, palpitations and leg swelling.   Gastrointestinal:  Positive for abdominal  "pain and constipation. Negative for anal bleeding, blood in stool, diarrhea, nausea and vomiting.   Endocrine: Negative for polydipsia, polyphagia and polyuria.   Musculoskeletal:  Positive for arthralgias and gait problem.   Skin:  Negative for rash.   Neurological:  Positive for tremors and speech difficulty. Negative for headaches.   Psychiatric/Behavioral:  The patient is not nervous/anxious.        Objective   /78 (BP Location: Left arm, Patient Position: Sitting, Cuff Size: Standard)   Pulse 101   Temp (!) 96.4 °F (35.8 °C) (Temporal)   Resp 16   Ht 5' 7\" (1.702 m)   Wt 64.9 kg (143 lb)   SpO2 97%   BMI 22.40 kg/m²      Physical Exam  Vitals reviewed.   Constitutional:       General: He is not in acute distress.     Appearance: Normal appearance. He is well-developed. He is not ill-appearing, toxic-appearing or diaphoretic.   HENT:      Head: Normocephalic and atraumatic.      Right Ear: Tympanic membrane and external ear normal.      Left Ear: External ear normal.      Nose: Nose normal.      Mouth/Throat:      Mouth: Mucous membranes are moist.      Pharynx: No oropharyngeal exudate or posterior oropharyngeal erythema.   Eyes:      General: No scleral icterus.        Right eye: No discharge.         Left eye: No discharge.      Extraocular Movements: Extraocular movements intact.      Conjunctiva/sclera: Conjunctivae normal.   Cardiovascular:      Rate and Rhythm: Normal rate and regular rhythm.      Heart sounds: Normal heart sounds. No murmur heard.     No friction rub. No gallop.   Pulmonary:      Effort: Pulmonary effort is normal. No respiratory distress.      Breath sounds: Normal breath sounds. No stridor. No wheezing or rhonchi.   Abdominal:      General: Bowel sounds are normal. There is no distension.      Palpations: Abdomen is soft. There is no mass.      Tenderness: There is no abdominal tenderness. There is no guarding.   Musculoskeletal:         General: No swelling or deformity. " Normal range of motion.      Cervical back: Normal range of motion.      Right lower leg: No edema.      Left lower leg: No edema.   Skin:     General: Skin is warm.      Coloration: Skin is not jaundiced.   Neurological:      Mental Status: He is alert and oriented to person, place, and time. Mental status is at baseline.      Gait: Gait abnormal (Slow,shuffling gait).      Comments: Right dystonic tremor, Bradykinesia ,Shuffling gait , & Slurred speech   Psychiatric:         Attention and Perception: Attention normal.         Mood and Affect: Mood is anxious.         Speech: Speech normal. Speech is not rapid and pressured.         Behavior: Behavior normal.         Thought Content: Thought content is paranoid.

## 2025-02-20 NOTE — ASSESSMENT & PLAN NOTE
Orders:    MRI abdomen w wo contrast; Future    Cortisol Level, AM Specimen; Future    SARAH Screen w/Reflex Cascade; Future

## 2025-02-20 NOTE — ASSESSMENT & PLAN NOTE
Patient with persistent abdominal pain, bloating, difficulty passing gas,  Patient is convinced that his symptoms are related to undiagnosed malignancy or intra-abdominal infectious process  Extensive review of his labs showed normal stool test and negative celiac test  Gastric emptying study test was negative  His colonoscopy was unremarkable although test limited by poor prep  He will need repeat colonoscopy  He is very adamant that he wants an MRI done for further evaluation  We discussed that his symptoms could be related to autonomic dysfunction from his Parkinson disease.  Other differentials include irritable bowel syndrome.      Orders:    MRI abdomen w wo contrast; Future

## 2025-02-20 NOTE — ASSESSMENT & PLAN NOTE
Patient reports his son has lupus and his Mother has rheumatoid arthritis  Given constellation of symptoms including multiple joint pain and fatigue.  We will check for autoimmune condition.  Orders:    SARAH Screen w/Reflex Cascade; Future    RF Screen w/ Reflex to Titer; Future    C-reactive protein; Future    Sedimentation rate, automated; Future    Cyclic citrul peptide antibody, IgG; Future

## 2025-02-21 ENCOUNTER — APPOINTMENT (OUTPATIENT)
Dept: LAB | Facility: HOSPITAL | Age: 35
End: 2025-02-21
Payer: COMMERCIAL

## 2025-02-21 DIAGNOSIS — R63.0 POOR APPETITE: ICD-10-CM

## 2025-02-21 DIAGNOSIS — M25.50 POLYARTHRALGIA: ICD-10-CM

## 2025-02-21 DIAGNOSIS — R68.81 EARLY SATIETY: Primary | ICD-10-CM

## 2025-02-21 DIAGNOSIS — R63.4 WEIGHT LOSS: ICD-10-CM

## 2025-02-21 LAB
CORTIS AM PEAK SERPL-MCNC: 10 UG/DL (ref 6.7–22.6)
CRP SERPL QL: <1 MG/L
ERYTHROCYTE [SEDIMENTATION RATE] IN BLOOD: 3 MM/HOUR (ref 0–14)
IGA SERPL-MCNC: 175 MG/DL (ref 66–433)

## 2025-02-21 PROCEDURE — 86225 DNA ANTIBODY NATIVE: CPT

## 2025-02-21 PROCEDURE — 36415 COLL VENOUS BLD VENIPUNCTURE: CPT

## 2025-02-21 PROCEDURE — 86140 C-REACTIVE PROTEIN: CPT

## 2025-02-21 PROCEDURE — 82533 TOTAL CORTISOL: CPT

## 2025-02-21 PROCEDURE — 82784 ASSAY IGA/IGD/IGG/IGM EACH: CPT

## 2025-02-21 PROCEDURE — 85652 RBC SED RATE AUTOMATED: CPT

## 2025-02-21 PROCEDURE — 86038 ANTINUCLEAR ANTIBODIES: CPT

## 2025-02-21 PROCEDURE — 86200 CCP ANTIBODY: CPT

## 2025-02-21 PROCEDURE — 86364 TISS TRNSGLTMNASE EA IG CLAS: CPT

## 2025-02-21 PROCEDURE — 86430 RHEUMATOID FACTOR TEST QUAL: CPT

## 2025-02-24 LAB
CCP AB SER IA-ACNC: 0.7 (ref ?–10)
DSDNA IGG SERPL IA-ACNC: 4.1 IU/ML (ref ?–15)
NUCLEAR IGG SER IA-RTO: 0.2 RATIO (ref ?–1)
RHEUMATOID FACT SER QL LA: NEGATIVE
TTG IGA SER IA-ACNC: 0.5 U/ML (ref ?–10)

## 2025-02-26 ENCOUNTER — RESULTS FOLLOW-UP (OUTPATIENT)
Dept: FAMILY MEDICINE CLINIC | Facility: CLINIC | Age: 35
End: 2025-02-26

## 2025-03-10 ENCOUNTER — OFFICE VISIT (OUTPATIENT)
Dept: FAMILY MEDICINE CLINIC | Facility: CLINIC | Age: 35
End: 2025-03-10

## 2025-03-10 VITALS
DIASTOLIC BLOOD PRESSURE: 64 MMHG | RESPIRATION RATE: 16 BRPM | HEIGHT: 67 IN | TEMPERATURE: 98 F | BODY MASS INDEX: 22.4 KG/M2 | HEART RATE: 85 BPM | SYSTOLIC BLOOD PRESSURE: 118 MMHG | OXYGEN SATURATION: 95 %

## 2025-03-10 DIAGNOSIS — R63.0 POOR APPETITE: ICD-10-CM

## 2025-03-10 DIAGNOSIS — R19.5 LOOSE STOOLS: ICD-10-CM

## 2025-03-10 DIAGNOSIS — F41.9 ANXIETY: ICD-10-CM

## 2025-03-10 DIAGNOSIS — R10.12 LEFT UPPER QUADRANT ABDOMINAL PAIN: Primary | ICD-10-CM

## 2025-03-10 DIAGNOSIS — R63.4 WEIGHT LOSS: ICD-10-CM

## 2025-03-10 PROCEDURE — 99214 OFFICE O/P EST MOD 30 MIN: CPT | Performed by: FAMILY MEDICINE

## 2025-03-10 PROCEDURE — 3074F SYST BP LT 130 MM HG: CPT | Performed by: FAMILY MEDICINE

## 2025-03-10 PROCEDURE — 3078F DIAST BP <80 MM HG: CPT | Performed by: FAMILY MEDICINE

## 2025-03-10 RX ORDER — AMITRIPTYLINE HYDROCHLORIDE 10 MG/1
10 TABLET ORAL
Qty: 30 TABLET | Refills: 3 | Status: SHIPPED | OUTPATIENT
Start: 2025-03-10

## 2025-03-10 NOTE — PROGRESS NOTES
Name: Hemanth Burt      : 1990      MRN: [id number]  Encounter Provider: Moy Suarez MD  Encounter Date: 3/10/2025   Encounter department: [facility]  :  Assessment & Plan  Left upper quadrant abdominal pain  Chronic abdominal pain with abdominal bloating/early satiety and loose stool  Suspect IBS, small intestinal bacterial overgrowth versus functional dyspepsia  Reviewed recent labs and stool study with patient which has been unremarkable  I highly recommend complete colonoscopy evaluation by GI    Orders:    Food Allergy Profile; Future    H. pylori antigen, stool; Future    amitriptyline (ELAVIL) 10 mg tablet; Take 1 tablet (10 mg total) by mouth daily at bedtime    Poor appetite    Orders:    Food Allergy Profile; Future    Weight loss    Orders:    Food Allergy Profile; Future    H. pylori antigen, stool; Future    Loose stools    Orders:    Food Allergy Profile; Future    H. pylori antigen, stool; Future    Anxiety  Anxiety about health  Trial of Elavil  Recommend outpatient mental health therapy  Orders:    amitriptyline (ELAVIL) 10 mg tablet; Take 1 tablet (10 mg total) by mouth daily at bedtime           History of Present Illness   35-year-old male with a history of Parkinson disease who presents today for follow-up for abdominal pain.  He reports that he continues to experience unintentional weight loss, poor appetite, early satiety, abdominal pain, loose stool with mucus and abdominal bloating.  He has abdominal pain shortly after eating.  He cannot identify any particular food trigger.  He seems to be very anxious about the possibility of missed diagnosis of cancer or intra-abdominal infection.  He had a colonoscopy done a few months back which was limited due to poor prep.  He reports that he is unable to tolerate stool softener while he is on a clear liquid diet in preparation for colonoscopy.  He is very reluctant to obtain repeat colonoscopy.   "He has MRI pending for his abdomen in the next couple of weeks.  He has been reading a lot online about different diagnoses that could explain his symptoms.  He is not very convinced that his symptoms are unrelated to irritable bowel syndrome.  He reports that he has tried FODMAP diet with no improvement.  He is very anxious about his health overall.  He is not sleeping well due to anxiety.      Review of Systems   Constitutional:  Positive for appetite change, fatigue and unexpected weight change. Negative for chills, diaphoresis and fever.   Eyes:  Negative for visual disturbance.   Respiratory:  Negative for shortness of breath and wheezing.    Cardiovascular:  Negative for chest pain, palpitations and leg swelling.   Gastrointestinal:  Positive for abdominal pain. Negative for anal bleeding, blood in stool, diarrhea, nausea and vomiting.   Endocrine: Negative for polydipsia, polyphagia and polyuria.   Genitourinary:  Negative for dysuria, hematuria and urgency.   Musculoskeletal:  Positive for arthralgias and gait problem.   Skin:  Negative for rash.   Neurological:  Positive for tremors and speech difficulty. Negative for headaches.   Psychiatric/Behavioral:  Positive for sleep disturbance. The patient is nervous/anxious.        Objective   /64 (BP Location: Left arm, Patient Position: Sitting, Cuff Size: Standard)   Pulse 85   Temp 98 °F (36.7 °C) (Temporal)   Resp 16   Ht 5' 7\" (1.702 m)   SpO2 95%   BMI 22.40 kg/m²      Physical Exam  Vitals reviewed.   Constitutional:       General: He is not in acute distress.     Appearance: Normal appearance. He is well-developed. He is not ill-appearing, toxic-appearing or diaphoretic.   HENT:      Head: Normocephalic and atraumatic.      Right Ear: Tympanic membrane and external ear normal.      Left Ear: External ear normal.      Nose: Nose normal.      Mouth/Throat:      Mouth: Mucous membranes are moist.      Pharynx: No oropharyngeal exudate or " posterior oropharyngeal erythema.   Eyes:      General: No scleral icterus.        Right eye: No discharge.         Left eye: No discharge.      Extraocular Movements: Extraocular movements intact.      Conjunctiva/sclera: Conjunctivae normal.   Cardiovascular:      Rate and Rhythm: Normal rate and regular rhythm.      Heart sounds: Normal heart sounds. No murmur heard.     No friction rub. No gallop.   Pulmonary:      Effort: Pulmonary effort is normal. No respiratory distress.      Breath sounds: Normal breath sounds. No stridor. No wheezing or rhonchi.   Abdominal:      General: Bowel sounds are normal. There is no distension.      Palpations: Abdomen is soft. There is no mass.      Tenderness: There is no abdominal tenderness. There is no guarding or rebound.      Hernia: No hernia is present.   Musculoskeletal:         General: No swelling or deformity. Normal range of motion.      Cervical back: Normal range of motion.      Right lower leg: No edema.      Left lower leg: No edema.   Skin:     General: Skin is warm.      Coloration: Skin is not jaundiced.   Neurological:      Mental Status: He is alert and oriented to person, place, and time. Mental status is at baseline.      Gait: Gait abnormal (Slow,shuffling gait).      Comments: Right dystonic tremor, Bradykinesia ,Shuffling gait , & Slurred speech   Psychiatric:         Attention and Perception: Attention normal.         Mood and Affect: Mood is anxious.         Speech: Speech normal. Speech is not rapid and pressured.         Behavior: Behavior normal.         Thought Content: Thought content is paranoid.

## 2025-03-10 NOTE — ASSESSMENT & PLAN NOTE
Chronic abdominal pain with abdominal bloating/early satiety and loose stool  Suspect IBS, small intestinal bacterial overgrowth versus functional dyspepsia  Reviewed recent labs and stool study with patient which has been unremarkable  I highly recommend complete colonoscopy evaluation by GI    Orders:    Food Allergy Profile; Future    H. pylori antigen, stool; Future    amitriptyline (ELAVIL) 10 mg tablet; Take 1 tablet (10 mg total) by mouth daily at bedtime

## 2025-03-20 ENCOUNTER — APPOINTMENT (OUTPATIENT)
Dept: LAB | Facility: HOSPITAL | Age: 35
End: 2025-03-20
Payer: COMMERCIAL

## 2025-03-20 DIAGNOSIS — R10.12 LEFT UPPER QUADRANT ABDOMINAL PAIN: ICD-10-CM

## 2025-03-20 DIAGNOSIS — R19.5 LOOSE STOOLS: ICD-10-CM

## 2025-03-20 DIAGNOSIS — R63.4 WEIGHT LOSS: ICD-10-CM

## 2025-03-20 DIAGNOSIS — R63.0 POOR APPETITE: ICD-10-CM

## 2025-03-20 PROCEDURE — 82785 ASSAY OF IGE: CPT

## 2025-03-20 PROCEDURE — 36415 COLL VENOUS BLD VENIPUNCTURE: CPT

## 2025-03-20 PROCEDURE — 86008 ALLG SPEC IGE RECOMB EA: CPT

## 2025-03-20 PROCEDURE — 86003 ALLG SPEC IGE CRUDE XTRC EA: CPT

## 2025-03-21 LAB
ALMOND IGE QN: <0.1 KUA/I (ref 0–0.1)
ARA H6 PEANUT: <0.1 KUA/I (ref 0–0.1)
CASHEW NUT IGE QN: <0.1 KUA/I (ref 0–0.1)
CODFISH IGE QN: <0.1 KUA/I (ref 0–0.1)
EGG WHITE IGE QN: <0.1 KUA/I (ref 0–0.1)
GLUTEN IGE QN: <0.1 KUA/I (ref 0–0.1)
HAZELNUT IGE QN: <0.1 KUA/L (ref 0–0.1)
MILK IGE QN: <0.1 KUA/I (ref 0–0.1)
PEANUT (RARA H) 1 IGE QN: <0.1 KUA/I (ref 0–0.1)
PEANUT (RARA H) 2 IGE QN: <0.1 KUA/I (ref 0–0.1)
PEANUT (RARA H) 3 IGE QN: <0.1 KUA/I (ref 0–0.1)
PEANUT (RARA H) 8 IGE QN: <0.1 KUA/I (ref 0–0.1)
PEANUT (RARA H) 9 IGE QN: <0.1 KUA/I (ref 0–0.1)
PEANUT IGE QN: 0.12 KUA/I (ref 0–0.1)
SALMON IGE QN: <0.1 KUA/I (ref 0–0.1)
SCALLOP IGE QN: <0.1 KUA/L (ref 0–0.1)
SESAME SEED IGE QN: <0.1 KUA/I (ref 0–0.1)
SHRIMP IGE QN: <0.1 KUA/L (ref 0–0.1)
SOYBEAN IGE QN: <0.1 KUA/I (ref 0–0.1)
TOTAL IGE SMQN RAST: 113 KU/L (ref 0–113)
TUNA IGE QN: <0.1 KUA/I (ref 0–0.1)
WALNUT IGE QN: <0.1 KUA/I (ref 0–0.1)
WHEAT IGE QN: <0.1 KUA/I (ref 0–0.1)

## 2025-03-25 ENCOUNTER — HOSPITAL ENCOUNTER (OUTPATIENT)
Dept: MRI IMAGING | Facility: HOSPITAL | Age: 35
Discharge: HOME | End: 2025-03-25
Attending: FAMILY MEDICINE

## 2025-03-25 DIAGNOSIS — R10.84 GENERALIZED ABDOMINAL PAIN: ICD-10-CM

## 2025-03-25 DIAGNOSIS — R68.81 EARLY SATIETY: ICD-10-CM

## 2025-03-25 DIAGNOSIS — R63.4 WEIGHT LOSS: ICD-10-CM

## 2025-03-25 DIAGNOSIS — R63.0 POOR APPETITE: ICD-10-CM

## 2025-04-01 ENCOUNTER — TELEPHONE (OUTPATIENT)
Dept: FAMILY MEDICINE CLINIC | Facility: CLINIC | Age: 35
End: 2025-04-01

## 2025-04-01 DIAGNOSIS — R63.4 WEIGHT LOSS: Primary | ICD-10-CM

## 2025-04-01 DIAGNOSIS — R63.0 POOR APPETITE: ICD-10-CM

## 2025-04-01 NOTE — TELEPHONE ENCOUNTER
----- Message from Moy Suarez MD sent at 4/1/2025  2:14 PM EDT -----  Patient is scheduled for MRI on 4/8/2024. Please make sure he obtains repeat blood work a few days before his MRI. Thank you so much.

## 2025-04-03 ENCOUNTER — OFFICE VISIT (OUTPATIENT)
Dept: FAMILY MEDICINE CLINIC | Facility: CLINIC | Age: 35
End: 2025-04-03

## 2025-04-03 ENCOUNTER — APPOINTMENT (OUTPATIENT)
Dept: LAB | Facility: HOSPITAL | Age: 35
End: 2025-04-03
Payer: COMMERCIAL

## 2025-04-03 VITALS
RESPIRATION RATE: 18 BRPM | HEART RATE: 71 BPM | SYSTOLIC BLOOD PRESSURE: 114 MMHG | TEMPERATURE: 97.8 F | WEIGHT: 141.6 LBS | OXYGEN SATURATION: 96 % | HEIGHT: 67 IN | DIASTOLIC BLOOD PRESSURE: 68 MMHG | BODY MASS INDEX: 22.22 KG/M2

## 2025-04-03 DIAGNOSIS — R10.12 LEFT UPPER QUADRANT ABDOMINAL PAIN: Primary | ICD-10-CM

## 2025-04-03 DIAGNOSIS — R63.4 WEIGHT LOSS: ICD-10-CM

## 2025-04-03 DIAGNOSIS — R45.89 ANXIETY ABOUT HEALTH: ICD-10-CM

## 2025-04-03 DIAGNOSIS — R63.4 WEIGHT LOSS: Primary | ICD-10-CM

## 2025-04-03 DIAGNOSIS — R63.0 POOR APPETITE: ICD-10-CM

## 2025-04-03 LAB
BUN SERPL-MCNC: 16 MG/DL (ref 5–25)
CREAT SERPL-MCNC: 0.8 MG/DL (ref 0.6–1.3)
GFR SERPL CREATININE-BSD FRML MDRD: 114 ML/MIN/1.73SQ M

## 2025-04-03 PROCEDURE — 82565 ASSAY OF CREATININE: CPT

## 2025-04-03 PROCEDURE — 36415 COLL VENOUS BLD VENIPUNCTURE: CPT

## 2025-04-03 PROCEDURE — 99214 OFFICE O/P EST MOD 30 MIN: CPT | Performed by: FAMILY MEDICINE

## 2025-04-03 PROCEDURE — 84520 ASSAY OF UREA NITROGEN: CPT

## 2025-04-03 PROCEDURE — 3078F DIAST BP <80 MM HG: CPT | Performed by: FAMILY MEDICINE

## 2025-04-03 PROCEDURE — 3074F SYST BP LT 130 MM HG: CPT | Performed by: FAMILY MEDICINE

## 2025-04-03 RX ORDER — LORAZEPAM 0.5 MG/1
0.5 TABLET ORAL ONCE AS NEEDED
Qty: 1 TABLET | Refills: 0 | Status: SHIPPED | OUTPATIENT
Start: 2025-04-03

## 2025-04-03 NOTE — ASSESSMENT & PLAN NOTE
Patient with persistent abdominal pain, weight loss, poor appetite, constipation and loose stool  Suspect his symptoms are related to disorders of the gut-brain axis such as IBS and functional dyspepsia  He was prescribed amitriptyline but he has not started the medication  Reviewed with him today his labs including allergy panel which showed mild peanut allergy  Recommend FODMAP diet  I highly recommend endoscopic evaluation but he wants to hold off until he completes his abdominal MRI.  He is scheduled for GI evaluation in the next few months  Follow-up after MRI of abdomen      Orders:    Enteric Parasite Panel; Future

## 2025-04-03 NOTE — ASSESSMENT & PLAN NOTE
Patient is preoccupied about parasitic gut infection.  I believe this is unlikely the cause for his symptoms.  I provided him with information to schedule outpatient mental health therapy to manage his anxiety    Orders:    LORazepam (Ativan) 0.5 mg tablet; Take 1 tablet (0.5 mg total) by mouth once as needed for sedation (before MRI) for up to 1 dose
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
## 2025-04-08 ENCOUNTER — HOSPITAL ENCOUNTER (OUTPATIENT)
Dept: MRI IMAGING | Facility: HOSPITAL | Age: 35
Discharge: HOME | End: 2025-04-08

## 2025-04-08 DIAGNOSIS — R10.84 GENERALIZED ABDOMINAL PAIN: ICD-10-CM

## 2025-04-08 DIAGNOSIS — R63.0 POOR APPETITE: ICD-10-CM

## 2025-04-08 DIAGNOSIS — R68.81 EARLY SATIETY: ICD-10-CM

## 2025-04-08 DIAGNOSIS — R63.4 WEIGHT LOSS: ICD-10-CM

## 2025-04-14 DIAGNOSIS — R68.81 EARLY SATIETY: Primary | ICD-10-CM

## 2025-04-14 DIAGNOSIS — R63.0 POOR APPETITE: ICD-10-CM

## 2025-04-14 DIAGNOSIS — R10.12 LEFT UPPER QUADRANT ABDOMINAL PAIN: ICD-10-CM

## 2025-04-14 DIAGNOSIS — R63.4 WEIGHT LOSS: ICD-10-CM

## 2025-04-14 DIAGNOSIS — R10.84 GENERALIZED ABDOMINAL PAIN: ICD-10-CM

## 2025-04-17 ENCOUNTER — APPOINTMENT (OUTPATIENT)
Dept: LAB | Facility: HOSPITAL | Age: 35
End: 2025-04-17

## 2025-04-21 ENCOUNTER — APPOINTMENT (OUTPATIENT)
Dept: LAB | Facility: HOSPITAL | Age: 35
End: 2025-04-21
Payer: COMMERCIAL

## 2025-04-21 DIAGNOSIS — R63.4 WEIGHT LOSS: ICD-10-CM

## 2025-04-21 DIAGNOSIS — R63.0 POOR APPETITE: ICD-10-CM

## 2025-04-21 DIAGNOSIS — K59.00 CONSTIPATION, UNSPECIFIED CONSTIPATION TYPE: ICD-10-CM

## 2025-04-21 DIAGNOSIS — R10.12 LEFT UPPER QUADRANT ABDOMINAL PAIN: ICD-10-CM

## 2025-04-21 DIAGNOSIS — R19.5 LOOSE STOOLS: ICD-10-CM

## 2025-04-21 PROCEDURE — 87505 NFCT AGENT DETECTION GI: CPT

## 2025-04-21 PROCEDURE — 87338 HPYLORI STOOL AG IA: CPT

## 2025-04-22 LAB
CRYPTOSP DNA SPEC QL NAA+PROBE: NEGATIVE
E HISTOLYT DNA SPEC QL NAA+PROBE: NEGATIVE
G LAMBLIA DNA SPEC QL NAA+PROBE: NEGATIVE

## 2025-04-22 RX ORDER — DOCUSATE SODIUM 50MG AND SENNOSIDES 8.6MG 8.6; 5 MG/1; MG/1
1 TABLET, FILM COATED ORAL 2 TIMES DAILY
Qty: 60 TABLET | Refills: 1 | Status: SHIPPED | OUTPATIENT
Start: 2025-04-22

## 2025-04-23 LAB — H PYLORI AG STL QL IA: NEGATIVE

## 2025-05-08 ENCOUNTER — TELEPHONE (OUTPATIENT)
Dept: FAMILY MEDICINE CLINIC | Facility: CLINIC | Age: 35
End: 2025-05-08

## 2025-05-12 ENCOUNTER — OFFICE VISIT (OUTPATIENT)
Dept: FAMILY MEDICINE CLINIC | Facility: CLINIC | Age: 35
End: 2025-05-12

## 2025-05-12 VITALS
OXYGEN SATURATION: 98 % | SYSTOLIC BLOOD PRESSURE: 110 MMHG | RESPIRATION RATE: 16 BRPM | HEIGHT: 67 IN | WEIGHT: 136 LBS | HEART RATE: 82 BPM | TEMPERATURE: 97.8 F | DIASTOLIC BLOOD PRESSURE: 70 MMHG | BODY MASS INDEX: 21.35 KG/M2

## 2025-05-12 DIAGNOSIS — J11.1 INFLUENZA: Primary | ICD-10-CM

## 2025-05-12 DIAGNOSIS — G20.B1 PARKINSON'S DISEASE WITH DYSKINESIA, UNSPECIFIED WHETHER MANIFESTATIONS FLUCTUATE (HCC): ICD-10-CM

## 2025-05-12 DIAGNOSIS — Z13.1 SCREENING FOR DIABETES MELLITUS: ICD-10-CM

## 2025-05-12 DIAGNOSIS — L81.9 HYPERPIGMENTATION OF SKIN: ICD-10-CM

## 2025-05-12 PROBLEM — D72.819 LEUKOPENIA: Status: RESOLVED | Noted: 2024-12-24 | Resolved: 2025-05-12

## 2025-05-12 PROBLEM — I10 ESSENTIAL HYPERTENSION: Status: RESOLVED | Noted: 2018-03-15 | Resolved: 2025-05-12

## 2025-05-12 PROBLEM — M67.921 TENDINOPATHY OF RIGHT BICEPS TENDON: Status: RESOLVED | Noted: 2020-01-13 | Resolved: 2025-05-12

## 2025-05-12 PROBLEM — M79.604 RIGHT LEG PAIN: Status: RESOLVED | Noted: 2019-08-30 | Resolved: 2025-05-12

## 2025-05-12 PROBLEM — M20.011 MALLET FINGER OF RIGHT HAND: Status: RESOLVED | Noted: 2018-06-20 | Resolved: 2025-05-12

## 2025-05-12 LAB
SARS-COV-2 AG UPPER RESP QL IA: NEGATIVE
SL AMB POCT RAPID FLU A: NEGATIVE
SL AMB POCT RAPID FLU B: POSITIVE
VALID CONTROL: NORMAL

## 2025-05-12 PROCEDURE — 3074F SYST BP LT 130 MM HG: CPT | Performed by: FAMILY MEDICINE

## 2025-05-12 PROCEDURE — 3078F DIAST BP <80 MM HG: CPT | Performed by: FAMILY MEDICINE

## 2025-05-12 PROCEDURE — 87811 SARS-COV-2 COVID19 W/OPTIC: CPT | Performed by: FAMILY MEDICINE

## 2025-05-12 PROCEDURE — 87804 INFLUENZA ASSAY W/OPTIC: CPT | Performed by: FAMILY MEDICINE

## 2025-05-12 PROCEDURE — 99214 OFFICE O/P EST MOD 30 MIN: CPT | Performed by: FAMILY MEDICINE

## 2025-05-12 RX ORDER — BROMPHENIRAMINE MALEATE, PSEUDOEPHEDRINE HYDROCHLORIDE, AND DEXTROMETHORPHAN HYDROBROMIDE 2; 30; 10 MG/5ML; MG/5ML; MG/5ML
5 SYRUP ORAL 4 TIMES DAILY PRN
Qty: 120 ML | Refills: 0 | Status: SHIPPED | OUTPATIENT
Start: 2025-05-12

## 2025-05-12 NOTE — ASSESSMENT & PLAN NOTE
Uncontrolled movement disorder  Encouraged patient to follow-up with neurology outpatient  Continue levodopa -carbidopa

## 2025-05-12 NOTE — PROGRESS NOTES
Name: Hemanth Burt      : 1990      MRN: [id number]  Encounter Provider: Moy Suarez MD  Encounter Date: 2025   Encounter department: [facility]  :  Assessment & Plan  Influenza  A few day onset of flu like illness  Influenza B positive  Recommend supportive care  Orders:    POCT Rapid Covid Ag    POCT rapid flu A and B    brompheniramine-pseudoephedrine-DM 30-2-10 MG/5ML syrup; Take 5 mL by mouth 4 (four) times a day as needed for congestion or cough    Screening for diabetes mellitus    Orders:    Hemoglobin A1C; Future    Parkinson's disease with dyskinesia, unspecified whether manifestations fluctuate (HCC)  Uncontrolled movement disorder  Encouraged patient to follow-up with neurology outpatient  Continue levodopa -carbidopa       Hyperpigmentation of skin  Most likely benign hyperpigmentation of skin  Referral to dermatology per patient request  Orders:    Ambulatory Referral to Dermatology; Future           History of Present Illness   35-year-old male with a history of Parkinson disease who presents today for follow-up.  Patient reports that he has been experiencing dry cough, congestion, headache, fatigue, and subjective fever since Thursday, May 8, 2025.  He denies any sick contact.  He is also concerned about hyperpigmentation in his right knuckle and knees.  He would like to have dermatology evaluation.  He is worried about multiple health related conditions.  He continues to experience abdominal discomfort and weight loss.  He has abdominal MRI scheduled in a few weeks.      Review of Systems   Constitutional:  Positive for appetite change, fatigue, fever (Subjective) and unexpected weight change. Negative for chills and diaphoresis.   HENT:  Positive for congestion and sore throat.    Eyes:  Negative for visual disturbance.   Respiratory:  Positive for cough. Negative for shortness of breath and wheezing.    Cardiovascular:  Negative for  "chest pain, palpitations and leg swelling.   Gastrointestinal:  Positive for abdominal pain. Negative for blood in stool, diarrhea, nausea and vomiting.   Endocrine: Negative for polydipsia, polyphagia and polyuria.   Genitourinary:  Negative for dysuria, hematuria and urgency.   Musculoskeletal:  Positive for arthralgias, gait problem and myalgias.   Neurological:  Positive for tremors, speech difficulty and headaches.       Objective   /70 (BP Location: Left arm, Patient Position: Sitting, Cuff Size: Standard)   Pulse 82   Temp 97.8 °F (36.6 °C) (Temporal)   Resp 16   Ht 5' 7\" (1.702 m)   Wt 61.7 kg (136 lb)   SpO2 98%   BMI 21.30 kg/m²      Physical Exam  Vitals reviewed.   Constitutional:       General: He is not in acute distress.     Appearance: Normal appearance. He is well-developed. He is not ill-appearing, toxic-appearing or diaphoretic.   HENT:      Head: Normocephalic and atraumatic.      Right Ear: Tympanic membrane and external ear normal.      Left Ear: External ear normal.      Nose: Nose normal.      Mouth/Throat:      Mouth: Mucous membranes are moist.      Pharynx: No oropharyngeal exudate or posterior oropharyngeal erythema.   Eyes:      General: No scleral icterus.        Right eye: No discharge.         Left eye: No discharge.      Extraocular Movements: Extraocular movements intact.      Conjunctiva/sclera: Conjunctivae normal.   Cardiovascular:      Rate and Rhythm: Normal rate and regular rhythm.      Heart sounds: Normal heart sounds. No murmur heard.     No friction rub. No gallop.   Pulmonary:      Effort: Pulmonary effort is normal. No respiratory distress.      Breath sounds: Normal breath sounds. No stridor. No wheezing or rhonchi.   Abdominal:      General: Bowel sounds are normal. There is no distension.      Palpations: Abdomen is soft. There is no mass.      Tenderness: There is no abdominal tenderness. There is no guarding.   Musculoskeletal:         General: Normal " range of motion.      Cervical back: Normal range of motion.      Right lower leg: No edema.      Left lower leg: No edema.   Skin:     General: Skin is warm.      Coloration: Skin is not jaundiced.      Comments: Hyperpigmentation in knuckles and bilateral knees   Neurological:      Mental Status: He is alert and oriented to person, place, and time. Mental status is at baseline.      Gait: Gait abnormal (Slow,shuffling gait).      Comments: Right dystonic tremor, Bradykinesia ,Shuffling gait , & Slurred speech   Psychiatric:         Attention and Perception: Attention normal.         Mood and Affect: Mood is anxious.         Speech: Speech normal. Speech is not rapid and pressured.         Behavior: Behavior normal.

## 2025-05-13 ENCOUNTER — TELEPHONE (OUTPATIENT)
Dept: FAMILY MEDICINE CLINIC | Facility: CLINIC | Age: 35
End: 2025-05-13

## 2025-05-13 NOTE — TELEPHONE ENCOUNTER
Hello,    Called and s/w pt to assist in scheduling for Derm referral. Pt stated in his visit yesterday PCP ordered medication for the flu he can not take. brompheniramine-pseudoephedrine-DM 30-2-10 MG/5ML syrup     Pt req assistance req new order from PCP. Can you please order a new medication and call the pt when the order has been sent out?    Thank you,  Angle Cardona  Referral Tracking Coordinator

## 2025-05-13 NOTE — TELEPHONE ENCOUNTER
Iftikhar from [facility] pre admissions called the provider line in regards to the patient testing positive for the flu the anesthesia team recommend that he gets the MRI 2 weeks after his symptoms resolve.

## 2025-05-16 ENCOUNTER — HOSPITAL ENCOUNTER (EMERGENCY)
Facility: HOSPITAL | Age: 35
Discharge: HOME | End: 2025-05-16
Attending: EMERGENCY MEDICINE
Payer: COMMERCIAL

## 2025-05-16 ENCOUNTER — APPOINTMENT (EMERGENCY)
Dept: ULTRASOUND IMAGING | Facility: HOSPITAL | Age: 35
End: 2025-05-16
Payer: COMMERCIAL

## 2025-05-16 VITALS
HEART RATE: 67 BPM | DIASTOLIC BLOOD PRESSURE: 94 MMHG | TEMPERATURE: 97.5 F | OXYGEN SATURATION: 100 % | RESPIRATION RATE: 18 BRPM | SYSTOLIC BLOOD PRESSURE: 141 MMHG

## 2025-05-16 DIAGNOSIS — R10.9 ABDOMINAL PAIN: Primary | ICD-10-CM

## 2025-05-16 PROCEDURE — 76705 ECHO EXAM OF ABDOMEN: CPT

## 2025-05-16 PROCEDURE — 99284 EMERGENCY DEPT VISIT MOD MDM: CPT

## 2025-05-16 PROCEDURE — 99284 EMERGENCY DEPT VISIT MOD MDM: CPT | Performed by: EMERGENCY MEDICINE

## 2025-05-16 RX ORDER — FAMOTIDINE 20 MG/1
20 TABLET, FILM COATED ORAL ONCE
Status: DISCONTINUED | OUTPATIENT
Start: 2025-05-16 | End: 2025-05-16 | Stop reason: HOSPADM

## 2025-05-16 RX ORDER — MAGNESIUM HYDROXIDE/ALUMINUM HYDROXICE/SIMETHICONE 120; 1200; 1200 MG/30ML; MG/30ML; MG/30ML
30 SUSPENSION ORAL ONCE
Status: DISCONTINUED | OUTPATIENT
Start: 2025-05-16 | End: 2025-05-16 | Stop reason: HOSPADM

## 2025-05-16 RX ORDER — LORAZEPAM 1 MG/1
1 TABLET ORAL ONCE
Status: COMPLETED | OUTPATIENT
Start: 2025-05-16 | End: 2025-05-16

## 2025-05-16 RX ADMIN — LORAZEPAM 1 MG: 1 TABLET ORAL at 18:05

## 2025-05-16 NOTE — ED NOTES
Care Update     MsSon Jauregui showing signs of rapid decline in her hemodynamics and cardiac indexes despite maximum inotrope and MCS. I sat with her father Mr. Roth, and I explained to him the current situation and that she is getting worse despite maximum support. We discussed the code status and he agrees in the event of a cardiac or respiratory arrest, he doesn't want to proceed with chest compressions, electro cardioversion, IV pressors, intubation or mechanical ventilation. Discussed with staff.     Bi Castro MD  Cardiology Fellow (PGY-IV)  Pager: [phone]   US  at bedside.     Glenna Ngo RN  05/16/25 1939

## 2025-05-16 NOTE — ED NOTES
Pt calm, resting, and comfortable on the stretcher at this time.      Glenna Ngo RN  05/16/25 1951

## 2025-05-16 NOTE — ED NOTES
"Pt refusing IV and blood works, states \"my blood work hasn't changed.\" Pt refusing meds, asking for      Cherelle Nunes RN  05/16/25 2612    "

## 2025-05-16 NOTE — ED NOTES
"Pt refusing Iv and blood work, states \"my blood work hasn't changed.\" Pt refusing meds stating \"I need something to come down\" in reference to his excessive motor activity. Pt asking to take home med carbidopa-levodopa as brought in from home. Dr. Valenzuela aware of same. Pt medicated as charted in MAR. Pt asked to advise staff when he taken a home med, which has been approved as OK per Dr. Valenzuela, see RN communication. Pt is agreeable to US as ordered.        Cherelle Nunes RN  05/16/25 1835       Cherelle Nunes RN  05/16/25 1835    "

## 2025-05-16 NOTE — ED NOTES
Pt taking home medication, carbidopa-levodopa at this time. Ok per provider.     Glenna Ngo, RN  05/16/25 1957
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
## 2025-05-21 ENCOUNTER — HOSPITAL ENCOUNTER (OUTPATIENT)
Dept: RADIOLOGY | Facility: HOSPITAL | Age: 35
Discharge: HOME | End: 2025-05-21
Attending: FAMILY MEDICINE

## 2025-06-04 ENCOUNTER — TELEPHONE (OUTPATIENT)
Dept: GASTROENTEROLOGY | Facility: MEDICAL CENTER | Age: 35
End: 2025-06-04
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
## 2025-06-06 ENCOUNTER — TELEPHONE (OUTPATIENT)
Dept: INTERNAL MEDICINE CLINIC | Facility: CLINIC | Age: 35
End: 2025-06-06

## 2025-06-06 ENCOUNTER — HOSPITAL ENCOUNTER (EMERGENCY)
Facility: HOSPITAL | Age: 35
Discharge: HOME | End: 2025-06-06
Attending: EMERGENCY MEDICINE | Admitting: EMERGENCY MEDICINE
Payer: COMMERCIAL

## 2025-06-06 VITALS
OXYGEN SATURATION: 97 % | RESPIRATION RATE: 18 BRPM | DIASTOLIC BLOOD PRESSURE: 86 MMHG | WEIGHT: 145.5 LBS | HEART RATE: 83 BPM | BODY MASS INDEX: 22.79 KG/M2 | TEMPERATURE: 97.9 F | SYSTOLIC BLOOD PRESSURE: 138 MMHG

## 2025-06-06 DIAGNOSIS — L03.114 LEFT ARM CELLULITIS: Primary | ICD-10-CM

## 2025-06-06 PROCEDURE — 99284 EMERGENCY DEPT VISIT MOD MDM: CPT | Performed by: PHYSICIAN ASSISTANT

## 2025-06-06 PROCEDURE — 99282 EMERGENCY DEPT VISIT SF MDM: CPT

## 2025-06-06 RX ORDER — SULFAMETHOXAZOLE AND TRIMETHOPRIM 200; 40 MG/5ML; MG/5ML
20 SUSPENSION ORAL 2 TIMES DAILY
Qty: 400 ML | Refills: 0 | Status: SHIPPED | OUTPATIENT
Start: 2025-06-06 | End: 2025-06-16

## 2025-06-06 RX ORDER — CEPHALEXIN 250 MG/5ML
500 POWDER, FOR SUSPENSION ORAL EVERY 6 HOURS SCHEDULED
Qty: 400 ML | Refills: 0 | Status: SHIPPED | OUTPATIENT
Start: 2025-06-06 | End: 2025-06-16

## 2025-06-06 NOTE — ED NOTES
Attempted to get BP from pt. Pt was not able to keep still enough for accurate reading.      Michael Gutierrez RN  06/06/25 9209

## 2025-06-06 NOTE — TELEPHONE ENCOUNTER
Contacted pt because he was on wait list and has a referral for derm. He explained its not urgent right now and he will call at a later date.
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
## 2025-06-09 ENCOUNTER — HOSPITAL ENCOUNTER (EMERGENCY)
Facility: HOSPITAL | Age: 35
Discharge: HOME | End: 2025-06-09
Attending: EMERGENCY MEDICINE | Admitting: EMERGENCY MEDICINE
Payer: COMMERCIAL

## 2025-06-09 VITALS
RESPIRATION RATE: 18 BRPM | TEMPERATURE: 98.9 F | HEART RATE: 71 BPM | WEIGHT: 139.99 LBS | OXYGEN SATURATION: 98 % | BODY MASS INDEX: 21.93 KG/M2

## 2025-06-09 DIAGNOSIS — L03.114 CELLULITIS OF LEFT FOREARM: Primary | ICD-10-CM

## 2025-06-09 DIAGNOSIS — L02.91 ABSCESS: ICD-10-CM

## 2025-06-09 PROCEDURE — 99282 EMERGENCY DEPT VISIT SF MDM: CPT

## 2025-06-09 PROCEDURE — 99284 EMERGENCY DEPT VISIT MOD MDM: CPT | Performed by: EMERGENCY MEDICINE

## 2025-06-09 RX ADMIN — Medication 1 APPLICATION: at 17:28

## 2025-06-09 NOTE — DISCHARGE INSTRUCTIONS
Take antibiotics as previously prescribed  Warm compresses  Tylenol as needed for pain    Return to ER if spreading of redness despite being on antibiotics for 2 days, fevers, etc.
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
## 2025-06-10 ENCOUNTER — HOSPITAL ENCOUNTER (EMERGENCY)
Facility: HOSPITAL | Age: 35
Discharge: HOME | End: 2025-06-11
Attending: EMERGENCY MEDICINE
Payer: COMMERCIAL

## 2025-06-10 DIAGNOSIS — L03.114 CELLULITIS OF LEFT FOREARM: ICD-10-CM

## 2025-06-10 DIAGNOSIS — L02.91 ABSCESS: Primary | ICD-10-CM

## 2025-06-10 PROCEDURE — 99282 EMERGENCY DEPT VISIT SF MDM: CPT

## 2025-06-11 VITALS
DIASTOLIC BLOOD PRESSURE: 77 MMHG | HEART RATE: 89 BPM | SYSTOLIC BLOOD PRESSURE: 127 MMHG | RESPIRATION RATE: 16 BRPM | OXYGEN SATURATION: 97 % | TEMPERATURE: 98.3 F

## 2025-06-11 DIAGNOSIS — K59.00 CONSTIPATION, UNSPECIFIED CONSTIPATION TYPE: ICD-10-CM

## 2025-06-11 PROCEDURE — 96372 THER/PROPH/DIAG INJ SC/IM: CPT

## 2025-06-11 PROCEDURE — 10061 I&D ABSCESS COMP/MULTIPLE: CPT | Performed by: EMERGENCY MEDICINE

## 2025-06-11 PROCEDURE — 87070 CULTURE OTHR SPECIMN AEROBIC: CPT

## 2025-06-11 PROCEDURE — 87205 SMEAR GRAM STAIN: CPT

## 2025-06-11 PROCEDURE — 99284 EMERGENCY DEPT VISIT MOD MDM: CPT | Performed by: EMERGENCY MEDICINE

## 2025-06-11 PROCEDURE — 87147 CULTURE TYPE IMMUNOLOGIC: CPT

## 2025-06-11 PROCEDURE — 87186 SC STD MICRODIL/AGAR DIL: CPT

## 2025-06-11 RX ORDER — LIDOCAINE HYDROCHLORIDE 10 MG/ML
5 INJECTION, SOLUTION EPIDURAL; INFILTRATION; INTRACAUDAL; PERINEURAL ONCE
Status: COMPLETED | OUTPATIENT
Start: 2025-06-11 | End: 2025-06-11

## 2025-06-11 RX ORDER — KETOROLAC TROMETHAMINE 30 MG/ML
15 INJECTION, SOLUTION INTRAMUSCULAR; INTRAVENOUS ONCE
Status: COMPLETED | OUTPATIENT
Start: 2025-06-11 | End: 2025-06-11

## 2025-06-11 RX ORDER — DOCUSATE SODIUM 50MG AND SENNOSIDES 8.6MG 8.6; 5 MG/1; MG/1
1 TABLET, FILM COATED ORAL 2 TIMES DAILY
Qty: 60 TABLET | Refills: 1 | Status: SHIPPED | OUTPATIENT
Start: 2025-06-11

## 2025-06-11 RX ADMIN — LIDOCAINE HYDROCHLORIDE 5 ML: 10 INJECTION, SOLUTION EPIDURAL; INFILTRATION; INTRACAUDAL at 01:10

## 2025-06-11 RX ADMIN — KETOROLAC TROMETHAMINE 15 MG: 30 INJECTION, SOLUTION INTRAMUSCULAR; INTRAVENOUS at 00:27
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
## 2025-06-11 NOTE — DISCHARGE INSTRUCTIONS
Please continue to take your antibiotics as prescribed until they are completed.  You can clean the wound with gentle soap and water.  You will receive a phone call with the results of the wound culture.

## 2025-06-12 ENCOUNTER — TELEPHONE (OUTPATIENT)
Dept: FAMILY MEDICINE CLINIC | Facility: CLINIC | Age: 35
End: 2025-06-12

## 2025-06-12 ENCOUNTER — TELEPHONE (OUTPATIENT)
Dept: INTERNAL MEDICINE CLINIC | Facility: CLINIC | Age: 35
End: 2025-06-12

## 2025-06-12 NOTE — TELEPHONE ENCOUNTER
Patient called nurse line regarding senakot refill. Refill sent yesterday. RN called patient and left voicemail to contact pharmacy to see if script is ready.

## 2025-06-13 ENCOUNTER — RESULTS FOLLOW-UP (OUTPATIENT)
Dept: EMERGENCY DEPT | Facility: HOSPITAL | Age: 35
End: 2025-06-13

## 2025-06-13 LAB
BACTERIA WND AEROBE CULT: ABNORMAL
GRAM STN SPEC: ABNORMAL
GRAM STN SPEC: ABNORMAL

## 2025-06-16 ENCOUNTER — TELEPHONE (OUTPATIENT)
Dept: FAMILY MEDICINE CLINIC | Facility: CLINIC | Age: 35
End: 2025-06-16

## 2025-06-16 NOTE — TELEPHONE ENCOUNTER
Patient was prescribe this medication on 6/11 and pt still have one more refill I inform patient that is too early for refill

## 2025-06-18 NOTE — ED ATTENDING ATTESTATION
6/10/2025  I, Homer Valenzuela MD, saw and evaluated the patient. I have discussed the patient with the resident/non-physician practitioner and agree with the resident's/non-physician practitioner's findings, Plan of Care, and MDM as documented in the resident's/non-physician practitioner's note, except where noted. All available labs and Radiology studies were reviewed.  I was present for key portions of any procedure(s) performed by the resident/non-physician practitioner and I was immediately available to provide assistance.       At this point I agree with the current assessment done in the Emergency Department.  I have conducted an independent evaluation of this patient a history and physical is as follows:    ED Course     35-year-old male with history of Parkinson's disease here for evaluation of left forearm cellulitis with abscess.  Currently on Keflex but reports minimal improvement.  Does have an area of central fluctuance that just today started having spontaneous purulent drainage.  No fevers.  No nausea or vomiting.  Complains of a sensation of pressure to the area.  Left upper extremity neurovascularly intact.    On exam GCS 15 nonfocal, sclera nonicteric judgment normal, regular rate and rhythm, clear to auscultation bilaterally, abdomen soft nondistended nontender on the left upper extremity has erythema and swelling to the left forearm with an area of central fluctuance spontaneously draining purulent drainage.  No crepitus.  Normal neurovascular exam distally.    I/P: Left forearm cellulitis with abscess.  Will continue Keflex and Bactrim.  I&D performed by ED resident under my direct supervision as documented.  Plan for discharge with outpatient follow-up and return precautions.    Critical Care Time  Procedures

## 2025-07-23 ENCOUNTER — TELEPHONE (OUTPATIENT)
Dept: INTERNAL MEDICINE CLINIC | Facility: CLINIC | Age: 35
End: 2025-07-23

## 2025-07-31 DIAGNOSIS — K59.00 CONSTIPATION, UNSPECIFIED CONSTIPATION TYPE: ICD-10-CM

## 2025-07-31 RX ORDER — DOCUSATE SODIUM 50MG AND SENNOSIDES 8.6MG 8.6; 5 MG/1; MG/1
1 TABLET, FILM COATED ORAL 2 TIMES DAILY
Qty: 60 TABLET | Refills: 1 | Status: SHIPPED | OUTPATIENT
Start: 2025-07-31

## 2025-08-12 ENCOUNTER — OFFICE VISIT (OUTPATIENT)
Dept: FAMILY MEDICINE CLINIC | Facility: CLINIC | Age: 35
End: 2025-08-12

## 2025-08-14 ENCOUNTER — ANESTHESIA EVENT (OUTPATIENT)
Dept: MRI IMAGING | Facility: HOSPITAL | Age: 35
End: 2025-08-14
Payer: COMMERCIAL

## 2025-08-15 ENCOUNTER — ANESTHESIA (OUTPATIENT)
Dept: MRI IMAGING | Facility: HOSPITAL | Age: 35
End: 2025-08-15
Payer: COMMERCIAL

## 2025-08-15 ENCOUNTER — HOSPITAL ENCOUNTER (OUTPATIENT)
Dept: MRI IMAGING | Facility: HOSPITAL | Age: 35
End: 2025-08-15
Attending: FAMILY MEDICINE
Payer: COMMERCIAL

## 2025-08-22 ENCOUNTER — TELEPHONE (OUTPATIENT)
Dept: FAMILY MEDICINE CLINIC | Facility: CLINIC | Age: 35
End: 2025-08-22

## (undated) DEVICE — GLOVE SRG BIOGEL 7.5

## (undated) DEVICE — GLOVE INDICATOR PI UNDERGLOVE SZ 8 BLUE

## (undated) DEVICE — DRESSING MEPILEX AG BORDER POST-OP 4 X 6 IN

## (undated) DEVICE — SYRINGE 20ML LL

## (undated) DEVICE — PACK MAJOR ORTHO W/SPLITS PBDS

## (undated) DEVICE — KIT DISP FIBERTAK STRIAGHT SPEAR

## (undated) DEVICE — INSERTION KIT PERCUTANEOUS  F/2.9MM PUSHLOCK

## (undated) DEVICE — KIT DISP FIBERTAK CURVED SPEAR

## (undated) DEVICE — DRESSING MEPILEX AG BORDER POST-OP 4 X 8 IN

## (undated) DEVICE — ADHESIVE SKIN HIGH VISCOSITY EXOFIN 1ML

## (undated) DEVICE — COBAN 4 IN STERILE

## (undated) DEVICE — LIGHT HANDLE COVER SLEEVE DISP BLUE STELLAR

## (undated) DEVICE — CANNULA 7 X70MM THRD SEAL SIDE PORT

## (undated) DEVICE — Device

## (undated) DEVICE — LOOP SUT W/PASSER 25DEG CRV LT

## (undated) DEVICE — MAYO STAND COVER: Brand: CONVERTORS

## (undated) DEVICE — IMMOBILIZER SHOULDER UNIVERAL

## (undated) DEVICE — SUT ETHILON 3-0 PS-1 18 IN 1663G

## (undated) DEVICE — IMPERVIOUS STOCKINETTE: Brand: DEROYAL

## (undated) DEVICE — INTENDED FOR TISSUE SEPARATION, AND OTHER PROCEDURES THAT REQUIRE A SHARP SURGICAL BLADE TO PUNCTURE OR CUT.: Brand: BARD-PARKER ® CARBON RIB-BACK BLADES

## (undated) DEVICE — TUBING SUCTION 5MM X 12 FT

## (undated) DEVICE — GLOVE SRG BIOGEL 8

## (undated) DEVICE — U-DRAPE: Brand: CONVERTORS

## (undated) DEVICE — BLADE SHAVER TORPEDO 4MM 13CM  COOLCUT

## (undated) DEVICE — 10K ARTHROSCOPY INFLOW/OUTFLOW TUBE SET: Brand: 10K

## (undated) DEVICE — 3M™ STERI-STRIP™ REINFORCED ADHESIVE SKIN CLOSURES, R1547, 1/2 IN X 4 IN (12 MM X 100 MM), 6 STRIPS/ENVELOPE: Brand: 3M™ STERI-STRIP™

## (undated) DEVICE — DISPOSABLE EQUIPMENT COVER: Brand: SMALL TOWEL DRAPE

## (undated) DEVICE — NEEDLE 15 INSPINAL TIP 18 GA

## (undated) DEVICE — POSITIONER TRIMANO LIMB BEACH CHAIR

## (undated) DEVICE — VULCAN SAPHYRE II ABLATION PROBE                                    WITH SUCTION, 90 DEGREE, PEWTER SHAFT: Brand: VULCAN SAPHYRE

## (undated) DEVICE — NEEDLE SUT SCORPION MULTIFIRE

## (undated) DEVICE — CHLORAPREP HI-LITE 10.5ML ORANGE

## (undated) DEVICE — ARTHROSCOPY FLOOR MAT